# Patient Record
Sex: FEMALE | Race: WHITE | NOT HISPANIC OR LATINO | Employment: OTHER | URBAN - METROPOLITAN AREA
[De-identification: names, ages, dates, MRNs, and addresses within clinical notes are randomized per-mention and may not be internally consistent; named-entity substitution may affect disease eponyms.]

---

## 2017-01-10 ENCOUNTER — COMMUNICATION - HEALTHEAST (OUTPATIENT)
Dept: BEHAVIORAL HEALTH | Facility: CLINIC | Age: 62
End: 2017-01-10

## 2017-01-10 DIAGNOSIS — F98.8 ADD (ATTENTION DEFICIT DISORDER) WITHOUT HYPERACTIVITY: ICD-10-CM

## 2017-01-20 ENCOUNTER — COMMUNICATION - HEALTHEAST (OUTPATIENT)
Dept: ADMINISTRATIVE | Facility: CLINIC | Age: 62
End: 2017-01-20

## 2017-02-15 ENCOUNTER — COMMUNICATION - HEALTHEAST (OUTPATIENT)
Dept: BEHAVIORAL HEALTH | Facility: CLINIC | Age: 62
End: 2017-02-15

## 2017-02-15 DIAGNOSIS — F98.8 ADD (ATTENTION DEFICIT DISORDER) WITHOUT HYPERACTIVITY: ICD-10-CM

## 2017-02-21 ENCOUNTER — COMMUNICATION - HEALTHEAST (OUTPATIENT)
Dept: BEHAVIORAL HEALTH | Facility: CLINIC | Age: 62
End: 2017-02-21

## 2017-02-21 DIAGNOSIS — G89.29 OTHER CHRONIC PAIN: ICD-10-CM

## 2017-03-06 ENCOUNTER — OFFICE VISIT - HEALTHEAST (OUTPATIENT)
Dept: BEHAVIORAL HEALTH | Facility: CLINIC | Age: 62
End: 2017-03-06

## 2017-03-06 DIAGNOSIS — M16.10 ARTHROPATHY OF PELVIC REGION AND THIGH: ICD-10-CM

## 2017-03-06 DIAGNOSIS — F41.9 ANXIETY: ICD-10-CM

## 2017-03-06 DIAGNOSIS — F32.5 MAJOR DEPRESSION IN REMISSION (H): ICD-10-CM

## 2017-03-06 DIAGNOSIS — F98.8 ADD (ATTENTION DEFICIT DISORDER) WITHOUT HYPERACTIVITY: ICD-10-CM

## 2017-03-06 DIAGNOSIS — R07.81 RIB PAIN ON LEFT SIDE: ICD-10-CM

## 2017-03-06 DIAGNOSIS — F10.21 ALCOHOL DEPENDENCE IN REMISSION (H): ICD-10-CM

## 2017-03-06 ASSESSMENT — MIFFLIN-ST. JEOR: SCORE: 960.92

## 2017-03-14 ENCOUNTER — COMMUNICATION - HEALTHEAST (OUTPATIENT)
Dept: BEHAVIORAL HEALTH | Facility: CLINIC | Age: 62
End: 2017-03-14

## 2017-03-14 ENCOUNTER — RECORDS - HEALTHEAST (OUTPATIENT)
Dept: ADMINISTRATIVE | Facility: OTHER | Age: 62
End: 2017-03-14

## 2017-03-31 ENCOUNTER — COMMUNICATION - HEALTHEAST (OUTPATIENT)
Dept: INTERNAL MEDICINE | Facility: CLINIC | Age: 62
End: 2017-03-31

## 2017-03-31 DIAGNOSIS — G47.00 INSOMNIA: ICD-10-CM

## 2017-04-10 ENCOUNTER — COMMUNICATION - HEALTHEAST (OUTPATIENT)
Dept: BEHAVIORAL HEALTH | Facility: CLINIC | Age: 62
End: 2017-04-10

## 2017-04-10 DIAGNOSIS — F98.8 ADD (ATTENTION DEFICIT DISORDER) WITHOUT HYPERACTIVITY: ICD-10-CM

## 2017-05-04 ENCOUNTER — COMMUNICATION - HEALTHEAST (OUTPATIENT)
Dept: BEHAVIORAL HEALTH | Facility: CLINIC | Age: 62
End: 2017-05-04

## 2017-05-04 DIAGNOSIS — F98.8 ADD (ATTENTION DEFICIT DISORDER) WITHOUT HYPERACTIVITY: ICD-10-CM

## 2017-06-06 ENCOUNTER — COMMUNICATION - HEALTHEAST (OUTPATIENT)
Dept: BEHAVIORAL HEALTH | Facility: CLINIC | Age: 62
End: 2017-06-06

## 2017-06-06 ENCOUNTER — COMMUNICATION - HEALTHEAST (OUTPATIENT)
Dept: INTERNAL MEDICINE | Facility: CLINIC | Age: 62
End: 2017-06-06

## 2017-06-06 DIAGNOSIS — G47.00 INSOMNIA: ICD-10-CM

## 2017-06-06 DIAGNOSIS — F98.8 ADD (ATTENTION DEFICIT DISORDER) WITHOUT HYPERACTIVITY: ICD-10-CM

## 2017-06-12 ENCOUNTER — OFFICE VISIT - HEALTHEAST (OUTPATIENT)
Dept: BEHAVIORAL HEALTH | Facility: CLINIC | Age: 62
End: 2017-06-12

## 2017-06-12 DIAGNOSIS — M16.10 ARTHROPATHY OF PELVIC REGION AND THIGH: ICD-10-CM

## 2017-06-12 DIAGNOSIS — F32.5 MAJOR DEPRESSION IN REMISSION (H): ICD-10-CM

## 2017-06-12 DIAGNOSIS — F98.8 ADD (ATTENTION DEFICIT DISORDER): ICD-10-CM

## 2017-06-12 DIAGNOSIS — F10.21 ALCOHOL DEPENDENCE IN REMISSION (H): ICD-10-CM

## 2017-06-12 DIAGNOSIS — M54.12 CERVICAL NEURALGIA: ICD-10-CM

## 2017-06-12 ASSESSMENT — MIFFLIN-ST. JEOR: SCORE: 951.85

## 2017-06-22 ENCOUNTER — OFFICE VISIT - HEALTHEAST (OUTPATIENT)
Dept: FAMILY MEDICINE | Facility: CLINIC | Age: 62
End: 2017-06-22

## 2017-06-22 DIAGNOSIS — E78.2 MIXED HYPERLIPIDEMIA: ICD-10-CM

## 2017-06-22 DIAGNOSIS — F32.5 MAJOR DEPRESSION IN REMISSION (H): ICD-10-CM

## 2017-06-22 DIAGNOSIS — I42.9 IDIOPATHIC CARDIOMYOPATHY (H): ICD-10-CM

## 2017-06-22 DIAGNOSIS — Z00.00 ROUTINE GENERAL MEDICAL EXAMINATION AT A HEALTH CARE FACILITY: ICD-10-CM

## 2017-06-22 DIAGNOSIS — Z12.31 VISIT FOR SCREENING MAMMOGRAM: ICD-10-CM

## 2017-06-22 LAB
CHOLEST SERPL-MCNC: 158 MG/DL
FASTING STATUS PATIENT QL REPORTED: YES
HDLC SERPL-MCNC: 60 MG/DL
LDLC SERPL CALC-MCNC: 83 MG/DL
TRIGL SERPL-MCNC: 74 MG/DL

## 2017-06-22 ASSESSMENT — MIFFLIN-ST. JEOR: SCORE: 947.31

## 2017-06-27 ENCOUNTER — COMMUNICATION - HEALTHEAST (OUTPATIENT)
Dept: BEHAVIORAL HEALTH | Facility: CLINIC | Age: 62
End: 2017-06-27

## 2017-06-27 DIAGNOSIS — G47.00 INSOMNIA, UNSPECIFIED: ICD-10-CM

## 2017-06-28 ENCOUNTER — COMMUNICATION - HEALTHEAST (OUTPATIENT)
Dept: BEHAVIORAL HEALTH | Facility: CLINIC | Age: 62
End: 2017-06-28

## 2017-06-28 DIAGNOSIS — G47.00 INSOMNIA, UNSPECIFIED: ICD-10-CM

## 2017-07-03 ENCOUNTER — COMMUNICATION - HEALTHEAST (OUTPATIENT)
Dept: FAMILY MEDICINE | Facility: CLINIC | Age: 62
End: 2017-07-03

## 2017-07-03 DIAGNOSIS — I42.9 CARDIOMYOPATHY (H): ICD-10-CM

## 2017-07-03 DIAGNOSIS — E78.5 HYPERLIPIDEMIA: ICD-10-CM

## 2017-07-05 ENCOUNTER — COMMUNICATION - HEALTHEAST (OUTPATIENT)
Dept: INTERNAL MEDICINE | Facility: CLINIC | Age: 62
End: 2017-07-05

## 2017-07-05 DIAGNOSIS — G47.00 INSOMNIA: ICD-10-CM

## 2017-07-06 ENCOUNTER — COMMUNICATION - HEALTHEAST (OUTPATIENT)
Dept: BEHAVIORAL HEALTH | Facility: CLINIC | Age: 62
End: 2017-07-06

## 2017-07-06 DIAGNOSIS — F98.8 ADD (ATTENTION DEFICIT DISORDER) WITHOUT HYPERACTIVITY: ICD-10-CM

## 2017-07-13 ENCOUNTER — OFFICE VISIT - HEALTHEAST (OUTPATIENT)
Dept: FAMILY MEDICINE | Facility: CLINIC | Age: 62
End: 2017-07-13

## 2017-07-13 DIAGNOSIS — Z01.818 PREOP EXAMINATION: ICD-10-CM

## 2017-07-13 DIAGNOSIS — L98.8 AGE-RELATED FACIAL WRINKLES: ICD-10-CM

## 2017-07-13 LAB
ATRIAL RATE - MUSE: 61 BPM
DIASTOLIC BLOOD PRESSURE - MUSE: NORMAL MMHG
INTERPRETATION ECG - MUSE: NORMAL
P AXIS - MUSE: 75 DEGREES
PR INTERVAL - MUSE: 178 MS
QRS DURATION - MUSE: 90 MS
QT - MUSE: 430 MS
QTC - MUSE: 432 MS
R AXIS - MUSE: 26 DEGREES
SYSTOLIC BLOOD PRESSURE - MUSE: NORMAL MMHG
T AXIS - MUSE: 52 DEGREES
VENTRICULAR RATE- MUSE: 61 BPM

## 2017-07-13 ASSESSMENT — MIFFLIN-ST. JEOR: SCORE: 960.92

## 2017-07-16 ENCOUNTER — COMMUNICATION - HEALTHEAST (OUTPATIENT)
Dept: CARDIOLOGY | Facility: CLINIC | Age: 62
End: 2017-07-16

## 2017-07-16 DIAGNOSIS — I42.9 CARDIOMYOPATHY (H): ICD-10-CM

## 2017-07-17 ENCOUNTER — COMMUNICATION - HEALTHEAST (OUTPATIENT)
Dept: FAMILY MEDICINE | Facility: CLINIC | Age: 62
End: 2017-07-17

## 2017-07-18 ENCOUNTER — COMMUNICATION - HEALTHEAST (OUTPATIENT)
Dept: CARDIOLOGY | Facility: CLINIC | Age: 62
End: 2017-07-18

## 2017-07-18 DIAGNOSIS — I42.9 CARDIOMYOPATHY (H): ICD-10-CM

## 2017-07-20 ENCOUNTER — RECORDS - HEALTHEAST (OUTPATIENT)
Dept: MAMMOGRAPHY | Facility: CLINIC | Age: 62
End: 2017-07-20

## 2017-07-20 DIAGNOSIS — Z12.31 ENCOUNTER FOR SCREENING MAMMOGRAM FOR MALIGNANT NEOPLASM OF BREAST: ICD-10-CM

## 2017-07-28 ENCOUNTER — RECORDS - HEALTHEAST (OUTPATIENT)
Dept: ADMINISTRATIVE | Facility: OTHER | Age: 62
End: 2017-07-28

## 2017-08-04 ENCOUNTER — COMMUNICATION - HEALTHEAST (OUTPATIENT)
Dept: BEHAVIORAL HEALTH | Facility: CLINIC | Age: 62
End: 2017-08-04

## 2017-08-04 DIAGNOSIS — F98.8 ADD (ATTENTION DEFICIT DISORDER) WITHOUT HYPERACTIVITY: ICD-10-CM

## 2017-08-09 ENCOUNTER — COMMUNICATION - HEALTHEAST (OUTPATIENT)
Dept: BEHAVIORAL HEALTH | Facility: CLINIC | Age: 62
End: 2017-08-09

## 2017-08-09 DIAGNOSIS — G47.00 INSOMNIA, UNSPECIFIED: ICD-10-CM

## 2017-08-14 ENCOUNTER — COMMUNICATION - HEALTHEAST (OUTPATIENT)
Dept: BEHAVIORAL HEALTH | Facility: CLINIC | Age: 62
End: 2017-08-14

## 2017-08-14 DIAGNOSIS — G89.29 OTHER CHRONIC PAIN: ICD-10-CM

## 2017-09-05 ENCOUNTER — COMMUNICATION - HEALTHEAST (OUTPATIENT)
Dept: BEHAVIORAL HEALTH | Facility: CLINIC | Age: 62
End: 2017-09-05

## 2017-09-05 ENCOUNTER — COMMUNICATION - HEALTHEAST (OUTPATIENT)
Dept: FAMILY MEDICINE | Facility: CLINIC | Age: 62
End: 2017-09-05

## 2017-09-05 DIAGNOSIS — F98.8 ADD (ATTENTION DEFICIT DISORDER) WITHOUT HYPERACTIVITY: ICD-10-CM

## 2017-09-06 ENCOUNTER — AMBULATORY - HEALTHEAST (OUTPATIENT)
Dept: LAB | Facility: CLINIC | Age: 62
End: 2017-09-06

## 2017-09-06 DIAGNOSIS — F98.8 ADD (ATTENTION DEFICIT DISORDER) WITHOUT HYPERACTIVITY: ICD-10-CM

## 2017-10-02 ENCOUNTER — COMMUNICATION - HEALTHEAST (OUTPATIENT)
Dept: BEHAVIORAL HEALTH | Facility: CLINIC | Age: 62
End: 2017-10-02

## 2017-10-02 DIAGNOSIS — F98.8 ADD (ATTENTION DEFICIT DISORDER) WITHOUT HYPERACTIVITY: ICD-10-CM

## 2017-10-18 ENCOUNTER — COMMUNICATION - HEALTHEAST (OUTPATIENT)
Dept: CARDIOLOGY | Facility: CLINIC | Age: 62
End: 2017-10-18

## 2017-10-18 DIAGNOSIS — I42.9 CARDIOMYOPATHY (H): ICD-10-CM

## 2017-10-30 ENCOUNTER — COMMUNICATION - HEALTHEAST (OUTPATIENT)
Dept: BEHAVIORAL HEALTH | Facility: CLINIC | Age: 62
End: 2017-10-30

## 2017-10-30 DIAGNOSIS — F98.8 ADD (ATTENTION DEFICIT DISORDER) WITHOUT HYPERACTIVITY: ICD-10-CM

## 2017-11-12 ENCOUNTER — HEALTH MAINTENANCE LETTER (OUTPATIENT)
Age: 62
End: 2017-11-12

## 2017-11-27 ENCOUNTER — COMMUNICATION - HEALTHEAST (OUTPATIENT)
Dept: BEHAVIORAL HEALTH | Facility: CLINIC | Age: 62
End: 2017-11-27

## 2017-11-27 DIAGNOSIS — F98.8 ADD (ATTENTION DEFICIT DISORDER) WITHOUT HYPERACTIVITY: ICD-10-CM

## 2017-12-04 ENCOUNTER — OFFICE VISIT - HEALTHEAST (OUTPATIENT)
Dept: CARDIOLOGY | Facility: CLINIC | Age: 62
End: 2017-12-04

## 2017-12-04 ENCOUNTER — COMMUNICATION - HEALTHEAST (OUTPATIENT)
Dept: BEHAVIORAL HEALTH | Facility: CLINIC | Age: 62
End: 2017-12-04

## 2017-12-04 DIAGNOSIS — G47.00 INSOMNIA: ICD-10-CM

## 2017-12-04 DIAGNOSIS — I65.22 LEFT CAROTID STENOSIS: ICD-10-CM

## 2017-12-04 DIAGNOSIS — I25.10 CORONARY ATHEROSCLEROSIS DUE TO CALCIFIED CORONARY LESION: ICD-10-CM

## 2017-12-04 DIAGNOSIS — E78.2 MIXED HYPERLIPIDEMIA: ICD-10-CM

## 2017-12-04 DIAGNOSIS — I25.84 CORONARY ATHEROSCLEROSIS DUE TO CALCIFIED CORONARY LESION: ICD-10-CM

## 2017-12-04 DIAGNOSIS — I50.32 CHRONIC DIASTOLIC CHF (CONGESTIVE HEART FAILURE), NYHA CLASS 2 (H): ICD-10-CM

## 2017-12-04 ASSESSMENT — MIFFLIN-ST. JEOR: SCORE: 956.38

## 2017-12-07 ENCOUNTER — COMMUNICATION - HEALTHEAST (OUTPATIENT)
Dept: CARDIOLOGY | Facility: CLINIC | Age: 62
End: 2017-12-07

## 2017-12-07 DIAGNOSIS — I42.9 CARDIOMYOPATHY (H): ICD-10-CM

## 2017-12-20 ENCOUNTER — COMMUNICATION - HEALTHEAST (OUTPATIENT)
Dept: INTERNAL MEDICINE | Facility: CLINIC | Age: 62
End: 2017-12-20

## 2017-12-20 DIAGNOSIS — G47.00 INSOMNIA: ICD-10-CM

## 2017-12-22 ENCOUNTER — COMMUNICATION - HEALTHEAST (OUTPATIENT)
Dept: FAMILY MEDICINE | Facility: CLINIC | Age: 62
End: 2017-12-22

## 2017-12-22 DIAGNOSIS — F51.01 PRIMARY INSOMNIA: ICD-10-CM

## 2017-12-27 ENCOUNTER — COMMUNICATION - HEALTHEAST (OUTPATIENT)
Dept: BEHAVIORAL HEALTH | Facility: CLINIC | Age: 62
End: 2017-12-27

## 2017-12-27 DIAGNOSIS — F98.8 ADD (ATTENTION DEFICIT DISORDER) WITHOUT HYPERACTIVITY: ICD-10-CM

## 2018-01-09 ENCOUNTER — COMMUNICATION - HEALTHEAST (OUTPATIENT)
Dept: BEHAVIORAL HEALTH | Facility: CLINIC | Age: 63
End: 2018-01-09

## 2018-01-09 DIAGNOSIS — G47.00 INSOMNIA: ICD-10-CM

## 2018-01-12 ENCOUNTER — OFFICE VISIT - HEALTHEAST (OUTPATIENT)
Dept: BEHAVIORAL HEALTH | Facility: CLINIC | Age: 63
End: 2018-01-12

## 2018-01-12 DIAGNOSIS — F98.8 ATTENTION DEFICIT DISORDER (ADD) WITHOUT HYPERACTIVITY: ICD-10-CM

## 2018-01-12 DIAGNOSIS — F51.04 PSYCHOPHYSIOLOGICAL INSOMNIA: ICD-10-CM

## 2018-01-12 DIAGNOSIS — F10.21 ALCOHOL DEPENDENCE IN REMISSION (H): ICD-10-CM

## 2018-01-12 DIAGNOSIS — M54.12 CERVICAL NEURALGIA: ICD-10-CM

## 2018-01-12 DIAGNOSIS — F11.91 HISTORY OF NARCOTIC USE: ICD-10-CM

## 2018-01-12 DIAGNOSIS — G89.29 OTHER CHRONIC PAIN: ICD-10-CM

## 2018-01-12 DIAGNOSIS — F98.8 ADD (ATTENTION DEFICIT DISORDER) WITHOUT HYPERACTIVITY: ICD-10-CM

## 2018-01-12 DIAGNOSIS — F51.01 PRIMARY INSOMNIA: ICD-10-CM

## 2018-01-12 ASSESSMENT — MIFFLIN-ST. JEOR: SCORE: 956.38

## 2018-01-16 ENCOUNTER — COMMUNICATION - HEALTHEAST (OUTPATIENT)
Dept: BEHAVIORAL HEALTH | Facility: CLINIC | Age: 63
End: 2018-01-16

## 2018-02-08 ENCOUNTER — COMMUNICATION - HEALTHEAST (OUTPATIENT)
Dept: BEHAVIORAL HEALTH | Facility: CLINIC | Age: 63
End: 2018-02-08

## 2018-02-09 ENCOUNTER — COMMUNICATION - HEALTHEAST (OUTPATIENT)
Dept: BEHAVIORAL HEALTH | Facility: CLINIC | Age: 63
End: 2018-02-09

## 2018-02-28 ENCOUNTER — COMMUNICATION - HEALTHEAST (OUTPATIENT)
Dept: BEHAVIORAL HEALTH | Facility: CLINIC | Age: 63
End: 2018-02-28

## 2018-02-28 DIAGNOSIS — F98.8 ADD (ATTENTION DEFICIT DISORDER) WITHOUT HYPERACTIVITY: ICD-10-CM

## 2018-03-26 ENCOUNTER — COMMUNICATION - HEALTHEAST (OUTPATIENT)
Dept: BEHAVIORAL HEALTH | Facility: CLINIC | Age: 63
End: 2018-03-26

## 2018-03-26 DIAGNOSIS — F98.8 ADD (ATTENTION DEFICIT DISORDER) WITHOUT HYPERACTIVITY: ICD-10-CM

## 2018-04-23 ENCOUNTER — COMMUNICATION - HEALTHEAST (OUTPATIENT)
Dept: BEHAVIORAL HEALTH | Facility: CLINIC | Age: 63
End: 2018-04-23

## 2018-04-23 DIAGNOSIS — F98.8 ADD (ATTENTION DEFICIT DISORDER) WITHOUT HYPERACTIVITY: ICD-10-CM

## 2018-05-21 ENCOUNTER — COMMUNICATION - HEALTHEAST (OUTPATIENT)
Dept: BEHAVIORAL HEALTH | Facility: CLINIC | Age: 63
End: 2018-05-21

## 2018-05-21 DIAGNOSIS — F98.8 ADD (ATTENTION DEFICIT DISORDER) WITHOUT HYPERACTIVITY: ICD-10-CM

## 2018-05-22 ENCOUNTER — COMMUNICATION - HEALTHEAST (OUTPATIENT)
Dept: PALLIATIVE MEDICINE | Facility: OTHER | Age: 63
End: 2018-05-22

## 2018-05-22 DIAGNOSIS — F98.8 ADD (ATTENTION DEFICIT DISORDER) WITHOUT HYPERACTIVITY: ICD-10-CM

## 2018-06-03 ENCOUNTER — COMMUNICATION - HEALTHEAST (OUTPATIENT)
Dept: FAMILY MEDICINE | Facility: CLINIC | Age: 63
End: 2018-06-03

## 2018-06-03 DIAGNOSIS — F51.01 PRIMARY INSOMNIA: ICD-10-CM

## 2018-06-05 ENCOUNTER — COMMUNICATION - HEALTHEAST (OUTPATIENT)
Dept: FAMILY MEDICINE | Facility: CLINIC | Age: 63
End: 2018-06-05

## 2018-06-22 ENCOUNTER — COMMUNICATION - HEALTHEAST (OUTPATIENT)
Dept: PALLIATIVE MEDICINE | Facility: OTHER | Age: 63
End: 2018-06-22

## 2018-06-22 DIAGNOSIS — F98.8 ADD (ATTENTION DEFICIT DISORDER) WITHOUT HYPERACTIVITY: ICD-10-CM

## 2018-07-03 ENCOUNTER — COMMUNICATION - HEALTHEAST (OUTPATIENT)
Dept: FAMILY MEDICINE | Facility: CLINIC | Age: 63
End: 2018-07-03

## 2018-07-03 ENCOUNTER — COMMUNICATION - HEALTHEAST (OUTPATIENT)
Dept: BEHAVIORAL HEALTH | Facility: CLINIC | Age: 63
End: 2018-07-03

## 2018-07-03 DIAGNOSIS — E78.5 HYPERLIPIDEMIA: ICD-10-CM

## 2018-07-03 DIAGNOSIS — F51.04 PSYCHOPHYSIOLOGICAL INSOMNIA: ICD-10-CM

## 2018-07-05 ENCOUNTER — COMMUNICATION - HEALTHEAST (OUTPATIENT)
Dept: BEHAVIORAL HEALTH | Facility: CLINIC | Age: 63
End: 2018-07-05

## 2018-07-05 DIAGNOSIS — F51.04 PSYCHOPHYSIOLOGICAL INSOMNIA: ICD-10-CM

## 2018-07-20 ENCOUNTER — COMMUNICATION - HEALTHEAST (OUTPATIENT)
Dept: PALLIATIVE MEDICINE | Facility: OTHER | Age: 63
End: 2018-07-20

## 2018-07-20 DIAGNOSIS — F98.8 ADD (ATTENTION DEFICIT DISORDER) WITHOUT HYPERACTIVITY: ICD-10-CM

## 2018-07-23 ENCOUNTER — COMMUNICATION - HEALTHEAST (OUTPATIENT)
Dept: CARE COORDINATION | Facility: CLINIC | Age: 63
End: 2018-07-23

## 2018-08-02 ENCOUNTER — COMMUNICATION - HEALTHEAST (OUTPATIENT)
Dept: CARDIOLOGY | Facility: CLINIC | Age: 63
End: 2018-08-02

## 2018-08-02 DIAGNOSIS — I42.9 CARDIOMYOPATHY (H): ICD-10-CM

## 2018-08-09 ENCOUNTER — OFFICE VISIT - HEALTHEAST (OUTPATIENT)
Dept: FAMILY MEDICINE | Facility: CLINIC | Age: 63
End: 2018-08-09

## 2018-08-09 DIAGNOSIS — Z00.00 ROUTINE GENERAL MEDICAL EXAMINATION AT A HEALTH CARE FACILITY: ICD-10-CM

## 2018-08-09 DIAGNOSIS — G89.29 OTHER CHRONIC PAIN: ICD-10-CM

## 2018-08-09 DIAGNOSIS — M25.542 ARTHRALGIA OF BOTH HANDS: ICD-10-CM

## 2018-08-09 DIAGNOSIS — Z79.899 CONTROLLED SUBSTANCE AGREEMENT SIGNED: ICD-10-CM

## 2018-08-09 DIAGNOSIS — M25.541 ARTHRALGIA OF BOTH HANDS: ICD-10-CM

## 2018-08-09 DIAGNOSIS — I42.9 IDIOPATHIC CARDIOMYOPATHY (H): ICD-10-CM

## 2018-08-09 DIAGNOSIS — Z12.31 ENCOUNTER FOR SCREENING MAMMOGRAM FOR BREAST CANCER: ICD-10-CM

## 2018-08-09 DIAGNOSIS — F98.8 ADD (ATTENTION DEFICIT DISORDER) WITHOUT HYPERACTIVITY: ICD-10-CM

## 2018-08-09 DIAGNOSIS — F51.04 PSYCHOPHYSIOLOGICAL INSOMNIA: ICD-10-CM

## 2018-08-09 DIAGNOSIS — E78.2 MIXED HYPERLIPIDEMIA: ICD-10-CM

## 2018-08-09 LAB
ANION GAP SERPL CALCULATED.3IONS-SCNC: 13 MMOL/L (ref 5–18)
BUN SERPL-MCNC: 13 MG/DL (ref 8–22)
C REACTIVE PROTEIN LHE: <0.1 MG/DL (ref 0–0.8)
CALCIUM SERPL-MCNC: 9.5 MG/DL (ref 8.5–10.5)
CCP AB SER IA-ACNC: <0.5 U/ML
CHLORIDE BLD-SCNC: 105 MMOL/L (ref 98–107)
CHOLEST SERPL-MCNC: 190 MG/DL
CO2 SERPL-SCNC: 26 MMOL/L (ref 22–31)
CREAT SERPL-MCNC: 0.72 MG/DL (ref 0.6–1.1)
FASTING STATUS PATIENT QL REPORTED: YES
GFR SERPL CREATININE-BSD FRML MDRD: >60 ML/MIN/1.73M2
GLUCOSE BLD-MCNC: 84 MG/DL (ref 70–125)
HDLC SERPL-MCNC: 77 MG/DL
LDLC SERPL CALC-MCNC: 100 MG/DL
POTASSIUM BLD-SCNC: 3.5 MMOL/L (ref 3.5–5)
RHEUMATOID FACT SERPL-ACNC: <15 IU/ML (ref 0–30)
SODIUM SERPL-SCNC: 144 MMOL/L (ref 136–145)
TRIGL SERPL-MCNC: 67 MG/DL

## 2018-08-09 ASSESSMENT — MIFFLIN-ST. JEOR: SCORE: 956.38

## 2018-08-12 LAB
6-MONOACETYLMORPHINE: NOT DETECTED NG/ML
AMPHETAMINES: NEGATIVE NG/ML
BARBITURATES: NEGATIVE NG/ML
BENZODIAZEPINES: NEGATIVE NG/ML
BUPRENORPHINE: NOT DETECTED NG/ML
COCAINE: NEGATIVE NG/ML
CODEINE-6-BETA-GLUCURONIDE: NOT DETECTED NG/ML
CODEINE: NOT DETECTED NG/ML
COMMENT:: NORMAL
CREATININE URINE: 29.7 MG/DL
DIHYDROCODEINE: NOT DETECTED NG/ML
EDDP: NOT DETECTED NG/ML
FENTANYL: NOT DETECTED NG/ML
HYDROCODONE: NOT DETECTED NG/ML
HYDROMORPHONE-3-BETA-GLUCURONIDE: NOT DETECTED NG/ML
HYDROMORPHONE: NOT DETECTED NG/ML
MEPERIDINE: NOT DETECTED NG/ML
METHADONE: NOT DETECTED NG/ML
MORPHINE-6-BETA-GLUCURONIDE: NOT DETECTED NG/ML
MORPHINE: NOT DETECTED NG/ML
N-DESMETHYLTAPENTADOL: NOT DETECTED NG/ML
NALOXONE-3-BETA-GLUCURONIDE: NOT DETECTED NG/ML
NALOXONE: NOT DETECTED NG/ML
NORBUPRENORPHINE GLUCURONIDE: NOT DETECTED NG/ML
NORBUPRENORPHINE: NOT DETECTED NG/ML
NORFENTANYL: NOT DETECTED NG/ML
NORHYDROCODONE: NOT DETECTED NG/ML
NORMEPERIDINE: NOT DETECTED NG/ML
NOROXYCODONE: NOT DETECTED NG/ML
NOROXYMORPHONE: NOT DETECTED NG/ML
NORPROPOXYPHENE: NOT DETECTED NG/ML
O-DESMETHYLTRAMADOL: NOT DETECTED NG/ML
OPIOID INTERPRETATION: NORMAL
OXIDANTS URINE: NEGATIVE
OXYCODONE: NOT DETECTED NG/ML
OXYMORPHONE-3-BETA-GLUCURONIDE: NOT DETECTED NG/ML
OXYMORPHONE: NOT DETECTED NG/ML
PH UR STRIP: 7.4 [PH] (ref 5–7)
PHENCYCLIDINE: NEGATIVE NG/ML
PROPOXYPHENE: NOT DETECTED NG/ML
SP GR UR STRIP: 1.01
TAPENTADOL-BETA-GLUCURONIDE: NOT DETECTED NG/ML
TAPENTADOL: NOT DETECTED NG/ML
THC METABOLITE, UR.: NEGATIVE NG/ML
TRAMADOL: NOT DETECTED NG/ML

## 2018-08-13 LAB — ARUP MISCELLANEOUS TEST: NORMAL

## 2018-08-14 LAB
MISCELLANEOUS TEST DEPT. - HE HISTORICAL: NORMAL
PERFORMING LAB: NORMAL
SPECIMEN STATUS: NORMAL
TEST NAME: NORMAL

## 2018-08-19 ENCOUNTER — COMMUNICATION - HEALTHEAST (OUTPATIENT)
Dept: BEHAVIORAL HEALTH | Facility: CLINIC | Age: 63
End: 2018-08-19

## 2018-08-19 DIAGNOSIS — G89.29 OTHER CHRONIC PAIN: ICD-10-CM

## 2018-08-31 ENCOUNTER — AMBULATORY - HEALTHEAST (OUTPATIENT)
Dept: FAMILY MEDICINE | Facility: CLINIC | Age: 63
End: 2018-08-31

## 2018-08-31 DIAGNOSIS — Z79.899 CONTROLLED SUBSTANCE AGREEMENT SIGNED: ICD-10-CM

## 2018-09-26 ENCOUNTER — COMMUNICATION - HEALTHEAST (OUTPATIENT)
Dept: CARDIOLOGY | Facility: CLINIC | Age: 63
End: 2018-09-26

## 2018-10-03 ENCOUNTER — COMMUNICATION - HEALTHEAST (OUTPATIENT)
Dept: FAMILY MEDICINE | Facility: CLINIC | Age: 63
End: 2018-10-03

## 2018-10-03 DIAGNOSIS — F98.8 ADD (ATTENTION DEFICIT DISORDER) WITHOUT HYPERACTIVITY: ICD-10-CM

## 2018-10-03 DIAGNOSIS — E78.5 HYPERLIPIDEMIA: ICD-10-CM

## 2018-11-08 ENCOUNTER — OFFICE VISIT - HEALTHEAST (OUTPATIENT)
Dept: CARDIOLOGY | Facility: CLINIC | Age: 63
End: 2018-11-08

## 2018-11-08 DIAGNOSIS — I50.32 CHRONIC DIASTOLIC CHF (CONGESTIVE HEART FAILURE), NYHA CLASS 2 (H): ICD-10-CM

## 2018-11-08 DIAGNOSIS — I25.10 CORONARY ATHEROSCLEROSIS DUE TO CALCIFIED CORONARY LESION: ICD-10-CM

## 2018-11-08 DIAGNOSIS — I25.84 CORONARY ATHEROSCLEROSIS DUE TO CALCIFIED CORONARY LESION: ICD-10-CM

## 2018-11-08 DIAGNOSIS — I65.22 LEFT CAROTID STENOSIS: ICD-10-CM

## 2018-11-08 DIAGNOSIS — E78.2 MIXED HYPERLIPIDEMIA: ICD-10-CM

## 2018-11-08 ASSESSMENT — MIFFLIN-ST. JEOR: SCORE: 969.99

## 2018-12-03 ENCOUNTER — COMMUNICATION - HEALTHEAST (OUTPATIENT)
Dept: FAMILY MEDICINE | Facility: CLINIC | Age: 63
End: 2018-12-03

## 2018-12-03 DIAGNOSIS — F98.8 ADD (ATTENTION DEFICIT DISORDER) WITHOUT HYPERACTIVITY: ICD-10-CM

## 2019-01-07 ENCOUNTER — COMMUNICATION - HEALTHEAST (OUTPATIENT)
Dept: FAMILY MEDICINE | Facility: CLINIC | Age: 64
End: 2019-01-07

## 2019-01-07 DIAGNOSIS — F98.8 ADD (ATTENTION DEFICIT DISORDER) WITHOUT HYPERACTIVITY: ICD-10-CM

## 2019-01-22 ENCOUNTER — COMMUNICATION - HEALTHEAST (OUTPATIENT)
Dept: SCHEDULING | Facility: CLINIC | Age: 64
End: 2019-01-22

## 2019-01-22 ENCOUNTER — OFFICE VISIT - HEALTHEAST (OUTPATIENT)
Dept: FAMILY MEDICINE | Facility: CLINIC | Age: 64
End: 2019-01-22

## 2019-01-22 DIAGNOSIS — N64.4 BREAST PAIN, LEFT: ICD-10-CM

## 2019-01-22 DIAGNOSIS — H57.12 DISCOMFORT OF LEFT EYE: ICD-10-CM

## 2019-01-22 DIAGNOSIS — Z12.11 SCREEN FOR COLON CANCER: ICD-10-CM

## 2019-01-22 ASSESSMENT — MIFFLIN-ST. JEOR: SCORE: 974.89

## 2019-01-23 ENCOUNTER — RECORDS - HEALTHEAST (OUTPATIENT)
Dept: ADMINISTRATIVE | Facility: OTHER | Age: 64
End: 2019-01-23

## 2019-01-23 ENCOUNTER — COMMUNICATION - HEALTHEAST (OUTPATIENT)
Dept: FAMILY MEDICINE | Facility: CLINIC | Age: 64
End: 2019-01-23

## 2019-01-23 DIAGNOSIS — I42.9 CARDIOMYOPATHY (H): ICD-10-CM

## 2019-02-05 ENCOUNTER — COMMUNICATION - HEALTHEAST (OUTPATIENT)
Dept: FAMILY MEDICINE | Facility: CLINIC | Age: 64
End: 2019-02-05

## 2019-02-05 DIAGNOSIS — F51.04 PSYCHOPHYSIOLOGICAL INSOMNIA: ICD-10-CM

## 2019-02-10 ENCOUNTER — COMMUNICATION - HEALTHEAST (OUTPATIENT)
Dept: FAMILY MEDICINE | Facility: CLINIC | Age: 64
End: 2019-02-10

## 2019-02-10 DIAGNOSIS — F98.8 ADD (ATTENTION DEFICIT DISORDER) WITHOUT HYPERACTIVITY: ICD-10-CM

## 2019-02-15 ENCOUNTER — HOSPITAL ENCOUNTER (OUTPATIENT)
Dept: MAMMOGRAPHY | Facility: CLINIC | Age: 64
Discharge: HOME | End: 2019-02-15
Attending: NURSE PRACTITIONER

## 2019-02-15 DIAGNOSIS — N64.4 BREAST PAIN, LEFT: ICD-10-CM

## 2019-02-21 ENCOUNTER — OFFICE VISIT - HEALTHEAST (OUTPATIENT)
Dept: FAMILY MEDICINE | Facility: CLINIC | Age: 64
End: 2019-02-21

## 2019-02-21 DIAGNOSIS — G89.29 OTHER CHRONIC PAIN: ICD-10-CM

## 2019-02-21 DIAGNOSIS — F98.8 ADD (ATTENTION DEFICIT DISORDER) WITHOUT HYPERACTIVITY: ICD-10-CM

## 2019-02-21 DIAGNOSIS — F51.04 PSYCHOPHYSIOLOGICAL INSOMNIA: ICD-10-CM

## 2019-02-25 ENCOUNTER — COMMUNICATION - HEALTHEAST (OUTPATIENT)
Dept: FAMILY MEDICINE | Facility: CLINIC | Age: 64
End: 2019-02-25

## 2019-04-15 ENCOUNTER — COMMUNICATION - HEALTHEAST (OUTPATIENT)
Dept: FAMILY MEDICINE | Facility: CLINIC | Age: 64
End: 2019-04-15

## 2019-04-15 DIAGNOSIS — F98.8 ADD (ATTENTION DEFICIT DISORDER) WITHOUT HYPERACTIVITY: ICD-10-CM

## 2019-05-01 ENCOUNTER — COMMUNICATION - HEALTHEAST (OUTPATIENT)
Dept: FAMILY MEDICINE | Facility: CLINIC | Age: 64
End: 2019-05-01

## 2019-05-01 DIAGNOSIS — I42.9 CARDIOMYOPATHY (H): ICD-10-CM

## 2019-05-16 ENCOUNTER — COMMUNICATION - HEALTHEAST (OUTPATIENT)
Dept: FAMILY MEDICINE | Facility: CLINIC | Age: 64
End: 2019-05-16

## 2019-05-16 DIAGNOSIS — F98.8 ADD (ATTENTION DEFICIT DISORDER) WITHOUT HYPERACTIVITY: ICD-10-CM

## 2019-05-30 ENCOUNTER — COMMUNICATION - HEALTHEAST (OUTPATIENT)
Dept: FAMILY MEDICINE | Facility: CLINIC | Age: 64
End: 2019-05-30

## 2019-06-03 ENCOUNTER — COMMUNICATION - HEALTHEAST (OUTPATIENT)
Dept: FAMILY MEDICINE | Facility: CLINIC | Age: 64
End: 2019-06-03

## 2019-06-03 DIAGNOSIS — F51.01 PRIMARY INSOMNIA: ICD-10-CM

## 2019-06-16 ENCOUNTER — COMMUNICATION - HEALTHEAST (OUTPATIENT)
Dept: FAMILY MEDICINE | Facility: CLINIC | Age: 64
End: 2019-06-16

## 2019-06-16 DIAGNOSIS — F98.8 ADD (ATTENTION DEFICIT DISORDER) WITHOUT HYPERACTIVITY: ICD-10-CM

## 2019-07-01 ENCOUNTER — COMMUNICATION - HEALTHEAST (OUTPATIENT)
Dept: FAMILY MEDICINE | Facility: CLINIC | Age: 64
End: 2019-07-01

## 2019-07-01 DIAGNOSIS — E78.5 HYPERLIPIDEMIA: ICD-10-CM

## 2019-07-30 ENCOUNTER — COMMUNICATION - HEALTHEAST (OUTPATIENT)
Dept: FAMILY MEDICINE | Facility: CLINIC | Age: 64
End: 2019-07-30

## 2019-07-30 DIAGNOSIS — F98.8 ADD (ATTENTION DEFICIT DISORDER) WITHOUT HYPERACTIVITY: ICD-10-CM

## 2019-09-02 ENCOUNTER — COMMUNICATION - HEALTHEAST (OUTPATIENT)
Dept: FAMILY MEDICINE | Facility: CLINIC | Age: 64
End: 2019-09-02

## 2019-09-02 DIAGNOSIS — F98.8 ADD (ATTENTION DEFICIT DISORDER) WITHOUT HYPERACTIVITY: ICD-10-CM

## 2019-09-06 ENCOUNTER — COMMUNICATION - HEALTHEAST (OUTPATIENT)
Dept: FAMILY MEDICINE | Facility: CLINIC | Age: 64
End: 2019-09-06

## 2019-09-08 ENCOUNTER — COMMUNICATION - HEALTHEAST (OUTPATIENT)
Dept: FAMILY MEDICINE | Facility: CLINIC | Age: 64
End: 2019-09-08

## 2019-09-08 DIAGNOSIS — G89.29 OTHER CHRONIC PAIN: ICD-10-CM

## 2019-09-12 ENCOUNTER — COMMUNICATION - HEALTHEAST (OUTPATIENT)
Dept: FAMILY MEDICINE | Facility: CLINIC | Age: 64
End: 2019-09-12

## 2019-09-12 DIAGNOSIS — F51.04 PSYCHOPHYSIOLOGICAL INSOMNIA: ICD-10-CM

## 2019-10-02 ENCOUNTER — COMMUNICATION - HEALTHEAST (OUTPATIENT)
Dept: FAMILY MEDICINE | Facility: CLINIC | Age: 64
End: 2019-10-02

## 2019-10-02 DIAGNOSIS — F98.8 ADD (ATTENTION DEFICIT DISORDER) WITHOUT HYPERACTIVITY: ICD-10-CM

## 2019-10-06 ENCOUNTER — COMMUNICATION - HEALTHEAST (OUTPATIENT)
Dept: FAMILY MEDICINE | Facility: CLINIC | Age: 64
End: 2019-10-06

## 2019-10-06 DIAGNOSIS — G89.29 OTHER CHRONIC PAIN: ICD-10-CM

## 2019-10-07 ENCOUNTER — COMMUNICATION - HEALTHEAST (OUTPATIENT)
Dept: FAMILY MEDICINE | Facility: CLINIC | Age: 64
End: 2019-10-07

## 2019-10-07 DIAGNOSIS — F51.04 PSYCHOPHYSIOLOGICAL INSOMNIA: ICD-10-CM

## 2019-10-22 ENCOUNTER — OFFICE VISIT - HEALTHEAST (OUTPATIENT)
Dept: FAMILY MEDICINE | Facility: CLINIC | Age: 64
End: 2019-10-22

## 2019-10-22 DIAGNOSIS — Z79.899 CONTROLLED SUBSTANCE AGREEMENT SIGNED: ICD-10-CM

## 2019-10-22 DIAGNOSIS — E78.2 MIXED HYPERLIPIDEMIA: ICD-10-CM

## 2019-10-22 DIAGNOSIS — Z12.11 SPECIAL SCREENING FOR MALIGNANT NEOPLASMS, COLON: ICD-10-CM

## 2019-10-22 DIAGNOSIS — L98.9 SKIN LESION: ICD-10-CM

## 2019-10-22 DIAGNOSIS — F98.8 ADD (ATTENTION DEFICIT DISORDER) WITHOUT HYPERACTIVITY: ICD-10-CM

## 2019-10-22 DIAGNOSIS — G89.29 OTHER CHRONIC PAIN: ICD-10-CM

## 2019-10-22 ASSESSMENT — MIFFLIN-ST. JEOR: SCORE: 1038.03

## 2019-11-26 ENCOUNTER — COMMUNICATION - HEALTHEAST (OUTPATIENT)
Dept: FAMILY MEDICINE | Facility: CLINIC | Age: 64
End: 2019-11-26

## 2019-11-26 DIAGNOSIS — F98.8 ADD (ATTENTION DEFICIT DISORDER) WITHOUT HYPERACTIVITY: ICD-10-CM

## 2019-12-04 ENCOUNTER — COMMUNICATION - HEALTHEAST (OUTPATIENT)
Dept: FAMILY MEDICINE | Facility: CLINIC | Age: 64
End: 2019-12-04

## 2019-12-04 DIAGNOSIS — F51.01 PRIMARY INSOMNIA: ICD-10-CM

## 2019-12-05 ENCOUNTER — COMMUNICATION - HEALTHEAST (OUTPATIENT)
Dept: FAMILY MEDICINE | Facility: CLINIC | Age: 64
End: 2019-12-05

## 2019-12-05 DIAGNOSIS — F51.01 PRIMARY INSOMNIA: ICD-10-CM

## 2019-12-10 ENCOUNTER — RECORDS - HEALTHEAST (OUTPATIENT)
Dept: ADMINISTRATIVE | Facility: OTHER | Age: 64
End: 2019-12-10

## 2019-12-30 ENCOUNTER — COMMUNICATION - HEALTHEAST (OUTPATIENT)
Dept: FAMILY MEDICINE | Facility: CLINIC | Age: 64
End: 2019-12-30

## 2019-12-30 DIAGNOSIS — F98.8 ADD (ATTENTION DEFICIT DISORDER) WITHOUT HYPERACTIVITY: ICD-10-CM

## 2019-12-31 ENCOUNTER — COMMUNICATION - HEALTHEAST (OUTPATIENT)
Dept: FAMILY MEDICINE | Facility: CLINIC | Age: 64
End: 2019-12-31

## 2019-12-31 DIAGNOSIS — F98.8 ADD (ATTENTION DEFICIT DISORDER) WITHOUT HYPERACTIVITY: ICD-10-CM

## 2020-01-02 ENCOUNTER — COMMUNICATION - HEALTHEAST (OUTPATIENT)
Dept: FAMILY MEDICINE | Facility: CLINIC | Age: 65
End: 2020-01-02

## 2020-01-16 ENCOUNTER — RECORDS - HEALTHEAST (OUTPATIENT)
Dept: ADMINISTRATIVE | Facility: OTHER | Age: 65
End: 2020-01-16

## 2020-02-04 ENCOUNTER — COMMUNICATION - HEALTHEAST (OUTPATIENT)
Dept: ADMINISTRATIVE | Facility: CLINIC | Age: 65
End: 2020-02-04

## 2020-02-11 ENCOUNTER — COMMUNICATION - HEALTHEAST (OUTPATIENT)
Dept: FAMILY MEDICINE | Facility: CLINIC | Age: 65
End: 2020-02-11

## 2020-02-11 DIAGNOSIS — F98.8 ADD (ATTENTION DEFICIT DISORDER) WITHOUT HYPERACTIVITY: ICD-10-CM

## 2020-02-15 ENCOUNTER — COMMUNICATION - HEALTHEAST (OUTPATIENT)
Dept: FAMILY MEDICINE | Facility: CLINIC | Age: 65
End: 2020-02-15

## 2020-02-15 DIAGNOSIS — F98.8 ADD (ATTENTION DEFICIT DISORDER) WITHOUT HYPERACTIVITY: ICD-10-CM

## 2020-03-01 ENCOUNTER — HEALTH MAINTENANCE LETTER (OUTPATIENT)
Age: 65
End: 2020-03-01

## 2020-03-15 ENCOUNTER — COMMUNICATION - HEALTHEAST (OUTPATIENT)
Dept: FAMILY MEDICINE | Facility: CLINIC | Age: 65
End: 2020-03-15

## 2020-03-15 DIAGNOSIS — F98.8 ADD (ATTENTION DEFICIT DISORDER) WITHOUT HYPERACTIVITY: ICD-10-CM

## 2020-03-24 ENCOUNTER — AMBULATORY - HEALTHEAST (OUTPATIENT)
Dept: FAMILY MEDICINE | Facility: CLINIC | Age: 65
End: 2020-03-24

## 2020-03-24 DIAGNOSIS — G89.29 OTHER CHRONIC PAIN: ICD-10-CM

## 2020-04-01 ENCOUNTER — COMMUNICATION - HEALTHEAST (OUTPATIENT)
Dept: PHARMACY | Facility: CLINIC | Age: 65
End: 2020-04-01

## 2020-04-01 DIAGNOSIS — E78.5 HYPERLIPIDEMIA: ICD-10-CM

## 2020-04-11 ENCOUNTER — COMMUNICATION - HEALTHEAST (OUTPATIENT)
Dept: FAMILY MEDICINE | Facility: CLINIC | Age: 65
End: 2020-04-11

## 2020-04-13 ENCOUNTER — COMMUNICATION - HEALTHEAST (OUTPATIENT)
Dept: FAMILY MEDICINE | Facility: CLINIC | Age: 65
End: 2020-04-13

## 2020-04-13 DIAGNOSIS — F51.04 PSYCHOPHYSIOLOGICAL INSOMNIA: ICD-10-CM

## 2020-04-13 DIAGNOSIS — F98.8 ADD (ATTENTION DEFICIT DISORDER) WITHOUT HYPERACTIVITY: ICD-10-CM

## 2020-04-16 ENCOUNTER — OFFICE VISIT - HEALTHEAST (OUTPATIENT)
Dept: FAMILY MEDICINE | Facility: CLINIC | Age: 65
End: 2020-04-16

## 2020-04-16 DIAGNOSIS — M54.12 CERVICAL NEURALGIA: ICD-10-CM

## 2020-04-16 DIAGNOSIS — F51.01 PRIMARY INSOMNIA: ICD-10-CM

## 2020-04-16 DIAGNOSIS — E78.2 MIXED HYPERLIPIDEMIA: ICD-10-CM

## 2020-04-16 DIAGNOSIS — Z79.899 CONTROLLED SUBSTANCE AGREEMENT SIGNED: ICD-10-CM

## 2020-04-16 DIAGNOSIS — F98.8 ATTENTION DEFICIT DISORDER (ADD) WITHOUT HYPERACTIVITY: ICD-10-CM

## 2020-04-29 ENCOUNTER — OFFICE VISIT - HEALTHEAST (OUTPATIENT)
Dept: CARDIOLOGY | Facility: CLINIC | Age: 65
End: 2020-04-29

## 2020-04-29 DIAGNOSIS — E78.2 MIXED HYPERLIPIDEMIA: ICD-10-CM

## 2020-04-29 DIAGNOSIS — I25.84 CORONARY ATHEROSCLEROSIS DUE TO CALCIFIED CORONARY LESION: ICD-10-CM

## 2020-04-29 DIAGNOSIS — I25.10 CORONARY ATHEROSCLEROSIS DUE TO CALCIFIED CORONARY LESION: ICD-10-CM

## 2020-04-29 DIAGNOSIS — I65.22 LEFT CAROTID STENOSIS: ICD-10-CM

## 2020-04-29 DIAGNOSIS — I50.32 CHRONIC DIASTOLIC CHF (CONGESTIVE HEART FAILURE), NYHA CLASS 2 (H): ICD-10-CM

## 2020-04-29 ASSESSMENT — MIFFLIN-ST. JEOR: SCORE: 1028.96

## 2020-05-11 ENCOUNTER — COMMUNICATION - HEALTHEAST (OUTPATIENT)
Dept: FAMILY MEDICINE | Facility: CLINIC | Age: 65
End: 2020-05-11

## 2020-05-11 DIAGNOSIS — F98.8 ADD (ATTENTION DEFICIT DISORDER) WITHOUT HYPERACTIVITY: ICD-10-CM

## 2020-06-02 ENCOUNTER — COMMUNICATION - HEALTHEAST (OUTPATIENT)
Dept: FAMILY MEDICINE | Facility: CLINIC | Age: 65
End: 2020-06-02

## 2020-06-02 DIAGNOSIS — F51.01 PRIMARY INSOMNIA: ICD-10-CM

## 2020-06-09 ENCOUNTER — COMMUNICATION - HEALTHEAST (OUTPATIENT)
Dept: FAMILY MEDICINE | Facility: CLINIC | Age: 65
End: 2020-06-09

## 2020-06-09 DIAGNOSIS — F98.8 ADD (ATTENTION DEFICIT DISORDER) WITHOUT HYPERACTIVITY: ICD-10-CM

## 2020-06-16 ENCOUNTER — COMMUNICATION - HEALTHEAST (OUTPATIENT)
Dept: FAMILY MEDICINE | Facility: CLINIC | Age: 65
End: 2020-06-16

## 2020-06-16 DIAGNOSIS — I42.9 CARDIOMYOPATHY (H): ICD-10-CM

## 2020-06-22 ENCOUNTER — COMMUNICATION - HEALTHEAST (OUTPATIENT)
Dept: FAMILY MEDICINE | Facility: CLINIC | Age: 65
End: 2020-06-22

## 2020-06-24 ENCOUNTER — COMMUNICATION - HEALTHEAST (OUTPATIENT)
Dept: FAMILY MEDICINE | Facility: CLINIC | Age: 65
End: 2020-06-24

## 2020-06-24 DIAGNOSIS — G89.29 OTHER CHRONIC PAIN: ICD-10-CM

## 2020-07-09 ENCOUNTER — COMMUNICATION - HEALTHEAST (OUTPATIENT)
Dept: FAMILY MEDICINE | Facility: CLINIC | Age: 65
End: 2020-07-09

## 2020-07-09 DIAGNOSIS — F98.8 ADD (ATTENTION DEFICIT DISORDER) WITHOUT HYPERACTIVITY: ICD-10-CM

## 2020-07-13 ENCOUNTER — COMMUNICATION - HEALTHEAST (OUTPATIENT)
Dept: FAMILY MEDICINE | Facility: CLINIC | Age: 65
End: 2020-07-13

## 2020-07-13 DIAGNOSIS — F98.8 ADD (ATTENTION DEFICIT DISORDER) WITHOUT HYPERACTIVITY: ICD-10-CM

## 2020-08-09 ENCOUNTER — COMMUNICATION - HEALTHEAST (OUTPATIENT)
Dept: FAMILY MEDICINE | Facility: CLINIC | Age: 65
End: 2020-08-09

## 2020-08-09 DIAGNOSIS — F98.8 ADD (ATTENTION DEFICIT DISORDER) WITHOUT HYPERACTIVITY: ICD-10-CM

## 2020-08-10 ENCOUNTER — COMMUNICATION - HEALTHEAST (OUTPATIENT)
Dept: FAMILY MEDICINE | Facility: CLINIC | Age: 65
End: 2020-08-10

## 2020-08-10 DIAGNOSIS — Z79.899 CONTROLLED SUBSTANCE AGREEMENT SIGNED: ICD-10-CM

## 2020-08-25 ENCOUNTER — COMMUNICATION - HEALTHEAST (OUTPATIENT)
Dept: FAMILY MEDICINE | Facility: CLINIC | Age: 65
End: 2020-08-25

## 2020-08-25 DIAGNOSIS — F51.01 PRIMARY INSOMNIA: ICD-10-CM

## 2020-08-26 ENCOUNTER — COMMUNICATION - HEALTHEAST (OUTPATIENT)
Dept: FAMILY MEDICINE | Facility: CLINIC | Age: 65
End: 2020-08-26

## 2020-08-26 DIAGNOSIS — F51.01 PRIMARY INSOMNIA: ICD-10-CM

## 2020-08-27 ENCOUNTER — AMBULATORY - HEALTHEAST (OUTPATIENT)
Dept: LAB | Facility: CLINIC | Age: 65
End: 2020-08-27

## 2020-08-27 DIAGNOSIS — E78.2 MIXED HYPERLIPIDEMIA: ICD-10-CM

## 2020-08-27 LAB
CHOLEST SERPL-MCNC: 183 MG/DL
FASTING STATUS PATIENT QL REPORTED: YES
HDLC SERPL-MCNC: 67 MG/DL
LDLC SERPL CALC-MCNC: 95 MG/DL
TRIGL SERPL-MCNC: 104 MG/DL

## 2020-09-17 ENCOUNTER — COMMUNICATION - HEALTHEAST (OUTPATIENT)
Dept: FAMILY MEDICINE | Facility: CLINIC | Age: 65
End: 2020-09-17

## 2020-09-17 DIAGNOSIS — F98.8 ADD (ATTENTION DEFICIT DISORDER) WITHOUT HYPERACTIVITY: ICD-10-CM

## 2020-10-01 ENCOUNTER — COMMUNICATION - HEALTHEAST (OUTPATIENT)
Dept: FAMILY MEDICINE | Facility: CLINIC | Age: 65
End: 2020-10-01

## 2020-10-01 DIAGNOSIS — E78.5 HYPERLIPIDEMIA: ICD-10-CM

## 2020-10-01 DIAGNOSIS — G89.29 OTHER CHRONIC PAIN: ICD-10-CM

## 2020-10-05 ENCOUNTER — COMMUNICATION - HEALTHEAST (OUTPATIENT)
Dept: FAMILY MEDICINE | Facility: CLINIC | Age: 65
End: 2020-10-05

## 2020-10-09 ENCOUNTER — COMMUNICATION - HEALTHEAST (OUTPATIENT)
Dept: FAMILY MEDICINE | Facility: CLINIC | Age: 65
End: 2020-10-09

## 2020-10-12 ENCOUNTER — COMMUNICATION - HEALTHEAST (OUTPATIENT)
Dept: FAMILY MEDICINE | Facility: CLINIC | Age: 65
End: 2020-10-12

## 2020-10-12 DIAGNOSIS — F51.04 PSYCHOPHYSIOLOGICAL INSOMNIA: ICD-10-CM

## 2020-10-15 ENCOUNTER — COMMUNICATION - HEALTHEAST (OUTPATIENT)
Dept: FAMILY MEDICINE | Facility: CLINIC | Age: 65
End: 2020-10-15

## 2020-10-15 DIAGNOSIS — F98.8 ADD (ATTENTION DEFICIT DISORDER) WITHOUT HYPERACTIVITY: ICD-10-CM

## 2020-10-28 ENCOUNTER — AMBULATORY - HEALTHEAST (OUTPATIENT)
Dept: LAB | Facility: CLINIC | Age: 65
End: 2020-10-28

## 2020-10-28 DIAGNOSIS — Z79.899 CONTROLLED SUBSTANCE AGREEMENT SIGNED: ICD-10-CM

## 2020-10-31 LAB
6MAM UR QL: NOT DETECTED
7AMINOCLONAZEPAM UR QL: NOT DETECTED
A-OH ALPRAZ UR QL: NOT DETECTED
ALPRAZ UR QL: NOT DETECTED
AMPHET UR QL SCN: NOT DETECTED
BARBITURATES UR QL: NOT DETECTED
BUPRENORPHINE UR QL: NOT DETECTED
BZE UR QL: NOT DETECTED
CARBOXYTHC UR QL: NOT DETECTED
CARISOPRODOL UR QL: NOT DETECTED
CLONAZEPAM UR QL: NOT DETECTED
CODEINE UR QL: NOT DETECTED
CREAT UR-MCNC: <20 MG/DL (ref 20–400)
DIAZEPAM UR QL: NOT DETECTED
ETHYL GLUCURONIDE UR QL: NOT DETECTED
FENTANYL UR QL: NOT DETECTED
HYDROCODONE UR QL: NOT DETECTED
HYDROMORPHONE UR QL: NOT DETECTED
LORAZEPAM UR QL: NOT DETECTED
MDA UR QL: NOT DETECTED
MDEA UR QL: NOT DETECTED
MDMA UR QL: NOT DETECTED
ME-PHENIDATE UR QL: PRESENT
ME-PHENIDATE UR-MCNC: 76.6 NG/ML
MEPERIDINE UR QL: NOT DETECTED
METHADONE UR QL: NOT DETECTED
METHAMPHET UR QL: NOT DETECTED
MIDAZOLAM UR QL SCN: NOT DETECTED
MORPHINE UR QL: NOT DETECTED
NORBUPRENORPHINE UR QL CFM: NOT DETECTED
NORDIAZEPAM UR QL: NOT DETECTED
NORFENTANYL UR QL: NOT DETECTED
NORHYDROCODONE UR QL CFM: NOT DETECTED
NOROXYCODONE UR QL CFM: NOT DETECTED
NOROXYMORPHONE UR QL SCN: NOT DETECTED
OXAZEPAM UR QL: NOT DETECTED
OXYCODONE UR QL: NOT DETECTED
OXYMORPHONE UR QL: NOT DETECTED
PATHOLOGY STUDY: ABNORMAL
PCP UR QL: NOT DETECTED
PHENTERMINE UR QL: NOT DETECTED
PPAA UR-MCNC: 4638.8 NG/ML
PROPOXYPH UR QL: NOT DETECTED
SERVICE CMNT-IMP: ABNORMAL
TAPENTADOL UR QL SCN: NOT DETECTED
TAPENTADOL UR QL SCN: NOT DETECTED
TEMAZEPAM UR QL: NOT DETECTED
TRAMADOL UR QL: NOT DETECTED
ZOLPIDEM UR QL: NOT DETECTED

## 2020-11-09 ENCOUNTER — OFFICE VISIT - HEALTHEAST (OUTPATIENT)
Dept: FAMILY MEDICINE | Facility: CLINIC | Age: 65
End: 2020-11-09

## 2020-11-09 DIAGNOSIS — F98.8 ATTENTION DEFICIT DISORDER (ADD) WITHOUT HYPERACTIVITY: ICD-10-CM

## 2020-11-10 ENCOUNTER — COMMUNICATION - HEALTHEAST (OUTPATIENT)
Dept: FAMILY MEDICINE | Facility: CLINIC | Age: 65
End: 2020-11-10

## 2020-12-07 ENCOUNTER — COMMUNICATION - HEALTHEAST (OUTPATIENT)
Dept: FAMILY MEDICINE | Facility: CLINIC | Age: 65
End: 2020-12-07

## 2020-12-07 DIAGNOSIS — F98.8 ADD (ATTENTION DEFICIT DISORDER) WITHOUT HYPERACTIVITY: ICD-10-CM

## 2020-12-14 ENCOUNTER — HEALTH MAINTENANCE LETTER (OUTPATIENT)
Age: 65
End: 2020-12-14

## 2020-12-26 ENCOUNTER — COMMUNICATION - HEALTHEAST (OUTPATIENT)
Dept: FAMILY MEDICINE | Facility: CLINIC | Age: 65
End: 2020-12-26

## 2020-12-26 DIAGNOSIS — I42.9 CARDIOMYOPATHY (H): ICD-10-CM

## 2020-12-27 ENCOUNTER — COMMUNICATION - HEALTHEAST (OUTPATIENT)
Dept: FAMILY MEDICINE | Facility: CLINIC | Age: 65
End: 2020-12-27

## 2020-12-27 DIAGNOSIS — G89.29 OTHER CHRONIC PAIN: ICD-10-CM

## 2021-01-06 ENCOUNTER — COMMUNICATION - HEALTHEAST (OUTPATIENT)
Dept: FAMILY MEDICINE | Facility: CLINIC | Age: 66
End: 2021-01-06

## 2021-01-06 DIAGNOSIS — F98.8 ADD (ATTENTION DEFICIT DISORDER) WITHOUT HYPERACTIVITY: ICD-10-CM

## 2021-01-07 ENCOUNTER — COMMUNICATION - HEALTHEAST (OUTPATIENT)
Dept: FAMILY MEDICINE | Facility: CLINIC | Age: 66
End: 2021-01-07

## 2021-01-07 DIAGNOSIS — F51.04 PSYCHOPHYSIOLOGICAL INSOMNIA: ICD-10-CM

## 2021-01-08 ENCOUNTER — COMMUNICATION - HEALTHEAST (OUTPATIENT)
Dept: FAMILY MEDICINE | Facility: CLINIC | Age: 66
End: 2021-01-08

## 2021-01-08 DIAGNOSIS — F51.04 PSYCHOPHYSIOLOGICAL INSOMNIA: ICD-10-CM

## 2021-01-25 ENCOUNTER — COMMUNICATION - HEALTHEAST (OUTPATIENT)
Dept: FAMILY MEDICINE | Facility: CLINIC | Age: 66
End: 2021-01-25

## 2021-01-25 DIAGNOSIS — G89.29 OTHER CHRONIC PAIN: ICD-10-CM

## 2021-02-01 ENCOUNTER — COMMUNICATION - HEALTHEAST (OUTPATIENT)
Dept: FAMILY MEDICINE | Facility: CLINIC | Age: 66
End: 2021-02-01

## 2021-02-01 DIAGNOSIS — F98.8 ADD (ATTENTION DEFICIT DISORDER) WITHOUT HYPERACTIVITY: ICD-10-CM

## 2021-02-20 ENCOUNTER — COMMUNICATION - HEALTHEAST (OUTPATIENT)
Dept: FAMILY MEDICINE | Facility: CLINIC | Age: 66
End: 2021-02-20

## 2021-02-20 DIAGNOSIS — G89.29 OTHER CHRONIC PAIN: ICD-10-CM

## 2021-02-23 ENCOUNTER — COMMUNICATION - HEALTHEAST (OUTPATIENT)
Dept: FAMILY MEDICINE | Facility: CLINIC | Age: 66
End: 2021-02-23

## 2021-02-23 DIAGNOSIS — G89.29 OTHER CHRONIC PAIN: ICD-10-CM

## 2021-03-02 ENCOUNTER — OFFICE VISIT - HEALTHEAST (OUTPATIENT)
Dept: FAMILY MEDICINE | Facility: CLINIC | Age: 66
End: 2021-03-02

## 2021-03-02 DIAGNOSIS — F98.8 ADD (ATTENTION DEFICIT DISORDER) WITHOUT HYPERACTIVITY: ICD-10-CM

## 2021-03-02 DIAGNOSIS — Z00.00 ROUTINE GENERAL MEDICAL EXAMINATION AT A HEALTH CARE FACILITY: ICD-10-CM

## 2021-03-02 DIAGNOSIS — Z12.4 PAPANICOLAOU SMEAR FOR CERVICAL CANCER SCREENING: ICD-10-CM

## 2021-03-02 DIAGNOSIS — Z12.31 ENCOUNTER FOR SCREENING MAMMOGRAM FOR BREAST CANCER: ICD-10-CM

## 2021-03-02 ASSESSMENT — MIFFLIN-ST. JEOR: SCORE: 1001.37

## 2021-03-03 LAB
HPV SOURCE: NORMAL
HUMAN PAPILLOMA VIRUS 16 DNA: NEGATIVE
HUMAN PAPILLOMA VIRUS 18 DNA: NEGATIVE
HUMAN PAPILLOMA VIRUS FINAL DIAGNOSIS: NORMAL
HUMAN PAPILLOMA VIRUS OTHER HR: NEGATIVE
SPECIMEN DESCRIPTION: NORMAL

## 2021-03-08 LAB
BKR LAB AP ABNORMAL BLEEDING: NO
BKR LAB AP BIRTH CONTROL/HORMONES: NORMAL
BKR LAB AP CERVICAL APPEARANCE: NORMAL
BKR LAB AP GYN ADEQUACY: NORMAL
BKR LAB AP GYN INTERPRETATION: NORMAL
BKR LAB AP GYN OTHER FINDINGS: NORMAL
BKR LAB AP HPV REFLEX: NORMAL
BKR LAB AP LMP: NORMAL
BKR LAB AP PATIENT STATUS: NORMAL
BKR LAB AP PREVIOUS ABNORMAL: NORMAL
BKR LAB AP PREVIOUS NORMAL: NORMAL
HIGH RISK?: NO
PATH REPORT.COMMENTS IMP SPEC: NORMAL
RESULT FLAG (HE HISTORICAL CONVERSION): NORMAL

## 2021-03-09 ENCOUNTER — COMMUNICATION - HEALTHEAST (OUTPATIENT)
Dept: OBGYN | Facility: CLINIC | Age: 66
End: 2021-03-09

## 2021-03-10 ENCOUNTER — AMBULATORY - HEALTHEAST (OUTPATIENT)
Dept: NURSING | Facility: CLINIC | Age: 66
End: 2021-03-10

## 2021-03-26 ENCOUNTER — COMMUNICATION - HEALTHEAST (OUTPATIENT)
Dept: FAMILY MEDICINE | Facility: CLINIC | Age: 66
End: 2021-03-26

## 2021-03-26 DIAGNOSIS — I42.9 CARDIOMYOPATHY (H): ICD-10-CM

## 2021-03-31 ENCOUNTER — AMBULATORY - HEALTHEAST (OUTPATIENT)
Dept: NURSING | Facility: CLINIC | Age: 66
End: 2021-03-31

## 2021-03-31 ENCOUNTER — COMMUNICATION - HEALTHEAST (OUTPATIENT)
Dept: FAMILY MEDICINE | Facility: CLINIC | Age: 66
End: 2021-03-31

## 2021-03-31 DIAGNOSIS — E78.5 HYPERLIPIDEMIA: ICD-10-CM

## 2021-04-05 ENCOUNTER — COMMUNICATION - HEALTHEAST (OUTPATIENT)
Dept: FAMILY MEDICINE | Facility: CLINIC | Age: 66
End: 2021-04-05

## 2021-04-05 DIAGNOSIS — F98.8 ADD (ATTENTION DEFICIT DISORDER) WITHOUT HYPERACTIVITY: ICD-10-CM

## 2021-04-09 ENCOUNTER — COMMUNICATION - HEALTHEAST (OUTPATIENT)
Dept: FAMILY MEDICINE | Facility: CLINIC | Age: 66
End: 2021-04-09

## 2021-04-09 DIAGNOSIS — F51.04 PSYCHOPHYSIOLOGICAL INSOMNIA: ICD-10-CM

## 2021-04-22 ENCOUNTER — COMMUNICATION - HEALTHEAST (OUTPATIENT)
Dept: FAMILY MEDICINE | Facility: CLINIC | Age: 66
End: 2021-04-22

## 2021-04-22 DIAGNOSIS — I50.32 CHRONIC DIASTOLIC CHF (CONGESTIVE HEART FAILURE), NYHA CLASS 2 (H): ICD-10-CM

## 2021-04-23 ENCOUNTER — RECORDS - HEALTHEAST (OUTPATIENT)
Dept: GENERAL RADIOLOGY | Facility: CLINIC | Age: 66
End: 2021-04-23

## 2021-04-23 ENCOUNTER — OFFICE VISIT - HEALTHEAST (OUTPATIENT)
Dept: FAMILY MEDICINE | Facility: CLINIC | Age: 66
End: 2021-04-23

## 2021-04-23 DIAGNOSIS — M19.071 ARTHRITIS OF RIGHT ANKLE: ICD-10-CM

## 2021-04-23 DIAGNOSIS — M19.071 PRIMARY OSTEOARTHRITIS, RIGHT ANKLE AND FOOT: ICD-10-CM

## 2021-04-23 DIAGNOSIS — I50.32 CHRONIC DIASTOLIC CHF (CONGESTIVE HEART FAILURE), NYHA CLASS 2 (H): ICD-10-CM

## 2021-04-23 LAB
ALBUMIN SERPL-MCNC: 4.2 G/DL (ref 3.5–5)
ALP SERPL-CCNC: 77 U/L (ref 45–120)
ALT SERPL W P-5'-P-CCNC: 26 U/L (ref 0–45)
ANION GAP SERPL CALCULATED.3IONS-SCNC: 10 MMOL/L (ref 5–18)
AST SERPL W P-5'-P-CCNC: 29 U/L (ref 0–40)
BILIRUB SERPL-MCNC: 0.8 MG/DL (ref 0–1)
BUN SERPL-MCNC: 15 MG/DL (ref 8–22)
C REACTIVE PROTEIN LHE: 0.1 MG/DL (ref 0–0.8)
CALCIUM SERPL-MCNC: 9.9 MG/DL (ref 8.5–10.5)
CHLORIDE BLD-SCNC: 107 MMOL/L (ref 98–107)
CO2 SERPL-SCNC: 27 MMOL/L (ref 22–31)
CREAT SERPL-MCNC: 0.73 MG/DL (ref 0.6–1.1)
ERYTHROCYTE [DISTWIDTH] IN BLOOD BY AUTOMATED COUNT: 13 % (ref 11–14.5)
ERYTHROCYTE [SEDIMENTATION RATE] IN BLOOD BY WESTERGREN METHOD: 10 MM/HR (ref 0–20)
GFR SERPL CREATININE-BSD FRML MDRD: >60 ML/MIN/1.73M2
GLUCOSE BLD-MCNC: 99 MG/DL (ref 70–125)
HCT VFR BLD AUTO: 42.2 % (ref 35–47)
HGB BLD-MCNC: 13.8 G/DL (ref 12–16)
MCH RBC QN AUTO: 31.2 PG (ref 27–34)
MCHC RBC AUTO-ENTMCNC: 32.7 G/DL (ref 32–36)
MCV RBC AUTO: 96 FL (ref 80–100)
PLATELET # BLD AUTO: 257 THOU/UL (ref 140–440)
PMV BLD AUTO: 9.9 FL (ref 7–10)
POTASSIUM BLD-SCNC: 4.4 MMOL/L (ref 3.5–5)
PROT SERPL-MCNC: 6.6 G/DL (ref 6–8)
RBC # BLD AUTO: 4.42 MILL/UL (ref 3.8–5.4)
SODIUM SERPL-SCNC: 144 MMOL/L (ref 136–145)
TSH SERPL DL<=0.005 MIU/L-ACNC: 0.71 UIU/ML (ref 0.3–5)
URATE SERPL-MCNC: 3.5 MG/DL (ref 2–7.5)
WBC: 5.2 THOU/UL (ref 4–11)

## 2021-04-26 LAB
B BURGDOR IGG+IGM SER QL: 0.1 INDEX VALUE
RHEUMATOID FACT SERPL-ACNC: <15 IU/ML (ref 0–30)

## 2021-04-27 ENCOUNTER — COMMUNICATION - HEALTHEAST (OUTPATIENT)
Dept: FAMILY MEDICINE | Facility: CLINIC | Age: 66
End: 2021-04-27

## 2021-04-27 DIAGNOSIS — M13.171: ICD-10-CM

## 2021-04-27 LAB — CCP AB SER IA-ACNC: <0.5 U/ML

## 2021-04-28 LAB — ANA SER QL: 0.2 U

## 2021-05-01 ENCOUNTER — RECORDS - HEALTHEAST (OUTPATIENT)
Dept: ADMINISTRATIVE | Facility: OTHER | Age: 66
End: 2021-05-01

## 2021-05-03 ENCOUNTER — COMMUNICATION - HEALTHEAST (OUTPATIENT)
Dept: INTERNAL MEDICINE | Facility: CLINIC | Age: 66
End: 2021-05-03

## 2021-05-03 DIAGNOSIS — F98.8 ADD (ATTENTION DEFICIT DISORDER) WITHOUT HYPERACTIVITY: ICD-10-CM

## 2021-05-04 ENCOUNTER — RECORDS - HEALTHEAST (OUTPATIENT)
Dept: FAMILY MEDICINE | Facility: CLINIC | Age: 66
End: 2021-05-04

## 2021-05-04 ENCOUNTER — COMMUNICATION - HEALTHEAST (OUTPATIENT)
Dept: FAMILY MEDICINE | Facility: CLINIC | Age: 66
End: 2021-05-04

## 2021-05-21 ENCOUNTER — COMMUNICATION - HEALTHEAST (OUTPATIENT)
Dept: FAMILY MEDICINE | Facility: CLINIC | Age: 66
End: 2021-05-21

## 2021-05-21 DIAGNOSIS — F51.01 PRIMARY INSOMNIA: ICD-10-CM

## 2021-05-26 ENCOUNTER — COMMUNICATION - HEALTHEAST (OUTPATIENT)
Dept: FAMILY MEDICINE | Facility: CLINIC | Age: 66
End: 2021-05-26

## 2021-05-26 DIAGNOSIS — G89.29 OTHER CHRONIC PAIN: ICD-10-CM

## 2021-05-27 NOTE — TELEPHONE ENCOUNTER
Controlled Substance Refill Request  Medication:   Requested Prescriptions     Pending Prescriptions Disp Refills     methylphenidate HCl (CONCERTA) 54 MG CR tablet 30 tablet 0     Sig: One daily     Date Last Fill: 2/21/19  Pharmacy: [id number]   Submit electronically to pharmacy  Controlled Substance Agreement on File:   Encounter-Level CSA Scan Date:    There are no encounter-level csa scan date.       Last office visit: Last office visit pertaining to requested medication was 2/21/19.

## 2021-05-27 NOTE — TELEPHONE ENCOUNTER
Patient:   RAMÓN GARCIA JR            MRN: [id number]            FIN: [id number]               Age:   16 years     Sex:  MALE     :  [birth year]   Associated Diagnoses:   None   Author:   DIOMEDES VILLALOBOS      History of Present Illness     Source: Patient, Father   CC: Per patient, he was feeling depressed and at 2am this morning he took \"a lot\" of synthroid that is prescribed to his father's girlfriend. Per Dad, it was a 3 month supply, and per ED, it was 24.75g. He denies any trigger for his suicide attempt. No change with friends, at school or at home.    ER: Upon presentation to the ED, patient's vitals wnl and stable. On exam he was alert and in NAD, CV and neuro exams within normal limits. CBC and CMP unremarkable. Utox positive for cannabanoids. TSH low at 0.391 and fT3 elevated to 16.5. EKG showing NSR.     Patient seen and examined shortly after transfer to peds floor. Complaining of a burning sensation in his chest. Denies any lightheadedness, chest pain, palpitations, headaches. No nausea, vomiting, fever, chills, dysuria. Denies any current SI/HI.      PMHx:   - 10/2016 suicide attempt via motrin OD with subsequent  admission at [facility]   SxHx: None  Meds: None  Allergies: None  FamHx:    - Mom:  7 years ago. EtOH accidental (per Dad) overdose   - Dad: Alive and well   - Siblings: 18yo sister hx of suicide attempt. 15yo sister hx of self injury    - HEADSS:          - lives with Dad, Dad's GF and younger sister. States that his Dad is too lenient and lets him do whatever he wants          - In 9th grade, started 3 weeks ago.           - Drinks hard alcohol weekly, denies tobacco use, smokes mairjuana weekly. Last alcohol and drug use 1 week ago           - Mood symptoms as above  Immunization: Up to date  Developmental:  CSA 8/9/2018  Last refill given 3/14/19  Refill done   Did not elicit        PMD/Phone#:  Dr. Rodriguez at [address]          Review of Systems   General: no fever, chills, fatigue   HEENT: no headache, head trauma, changes in vision or hearing  CV: no chest pain, no palpitations, no LE edema  Pulm: no shortness of breath, no wheezing, no cough   GI: no nausea, vomiting, diarrhea, constipation, bloody stools  : no dysuria or hematuria  MS: no muscle or joint pain  Neuro: no numbness, tingling, or dizziness.  Skin: no rashes or concerning lesions         Physical Examination   VS/Measurements        Vitals between:   28-AUG-2017 13:56:26   TO   29-AUG-2017 13:56:26                   LAST RESULT MINIMUM MAXIMUM  Temperature 36.7 36.6 37  Heart Rate 84 76 95  Respiratory Rate 20 16 20  NISBP           118 118 128  NIDBP           75 73 83  NIMBP           89 89 98  SpO2                    100 97 100     General:  Alert and oriented, No acute distress.    Eye:  Pupils are equal, round and reactive to light, Extraocular movements are intact, eryrthematous conjunctiva b/l .    HENT:  Normocephalic, Oral mucosa is moist, No pharyngeal erythema.    Neck:  Supple, Non-tender, No lymphadenopathy.    Respiratory:  Lungs are clear to auscultation, Respirations are non-labored, Breath sounds are equal.    Cardiovascular:  Normal rate, Regular rhythm, No murmur, Good pulses equal in all extremities, Normal peripheral perfusion, No edema.    Gastrointestinal:  Soft, Non-tender, Non-distended, Normal bowel sounds.    Musculoskeletal:  Normal range of motion, Normal strength, No tenderness, No swelling, No deformity, Normal gait.    Integumentary:  Warm, Dry, No rash, no signs of self-injury .    Neurologic:  Alert, Oriented, Normal sensory, Normal motor function, No focal deficits, Cranial Nerves II-XII are grossly intact.         Deep tendon reflexes: Bilateral, Knee, 2+.         Altered level of consciousness: Drowsy.    Cognition and Speech:  Oriented, Speech clear and coherent,  benzos    - monitor patient on tele    - fT4 q12h   - patient on suicide precautions  - Kitty (psych liason) consulted, appreciate recs  - will likely need inpatient psych hospitalization once medically cleared     FEN/GI  - PO fluids   - pediatric diet    VTE: 0     Patient discussed with senior resident and attending    Soraya Lopez   PGY-1             Electronically Signed On 08/29/2017 14:27  __________________________________________________   SORAYA VILLALOBOS              I saw and examined this patient at 1:45pm.  I have reviewed the documented findings and I agree with the resident's information that is documented below except for any clarifications or changes as documented here.  No parent or guardian was present during my exam.  Plan discussed with nurse and resident teams.      Diagnoses:  suicide attempt, synthroid overdose, suidical ideation, history of suicide attempt, history of depression           Electronically Signed On 08/29/2017 15:49  __________________________________________________   SHANNAN BOBO

## 2021-05-28 NOTE — TELEPHONE ENCOUNTER
Controlled Substance Refill Request  Medication:   Requested Prescriptions     Pending Prescriptions Disp Refills     methylphenidate HCl (CONCERTA) 54 MG CR tablet 30 tablet 0     Sig: One daily     Date Last Fill: 4/18/19  Pharmacy: [id number]   Submit electronically to pharmacy  Controlled Substance Agreement on File:   Encounter-Level CSA Scan Date:    There are no encounter-level csa scan date.       Last office visit: Last office visit pertaining to requested medication was 2/21/19.

## 2021-05-28 NOTE — TELEPHONE ENCOUNTER
Refill Approved    Rx renewed per Medication Renewal Policy. Medication was last renewed on 1/25/19.    Ирина Naidu, [facility] Connection Triage/Med Refill 5/2/2019     Requested Prescriptions   Pending Prescriptions Disp Refills     carvedilol (COREG) 25 MG tablet [Pharmacy Med Name: CARVEDILOL 25MG TABLETS] 180 tablet 0     Sig: TAKE 1 TABLET BY MOUTH TWICE DAILY WITH MEALS       Beta-Blockers Refill Protocol Passed - 5/1/2019  3:49 PM        Passed - PCP or prescribing provider visit in past 12 months or next 3 months     Last office visit with prescriber/PCP: 2/21/2019 Avril Hernandez MD OR same dept: 2/21/2019 Avril Hernandez MD OR same specialty: 2/21/2019 Avril Hernandez MD  Last physical: 8/9/2018 Last MTM visit: Visit date not found   Next visit within 3 mo: Visit date not found  Next physical within 3 mo: Visit date not found  Prescriber OR PCP: Avril Hernandez MD  Last diagnosis associated with med order: 1. Cardiomyopathy (H)  - carvedilol (COREG) 25 MG tablet [Pharmacy Med Name: CARVEDILOL 25MG TABLETS]; TAKE 1 TABLET BY MOUTH TWICE DAILY WITH MEALS  Dispense: 180 tablet; Refill: 0    If protocol passes may refill for 12 months if within 3 months of last provider visit (or a total of 15 months).             Passed - Blood pressure filed in past 12 months     BP Readings from Last 1 Encounters:   02/21/19 128/72

## 2021-05-29 ENCOUNTER — RECORDS - HEALTHEAST (OUTPATIENT)
Dept: ADMINISTRATIVE | Facility: CLINIC | Age: 66
End: 2021-05-29

## 2021-05-29 NOTE — TELEPHONE ENCOUNTER
Controlled Substance Refill Request  Medication:   Requested Prescriptions     Pending Prescriptions Disp Refills     methylphenidate HCl (CONCERTA) 54 MG CR tablet 30 tablet 0     Sig: One daily     Date Last Fill: 5/17/19  Pharmacy: [id number]   Submit electronically to pharmacy  Controlled Substance Agreement on File:   Encounter-Level CSA Scan Date:    There are no encounter-level csa scan date.       Last office visit: Last office visit pertaining to requested medication was 2/21/19.

## 2021-05-30 ENCOUNTER — RECORDS - HEALTHEAST (OUTPATIENT)
Dept: ADMINISTRATIVE | Facility: CLINIC | Age: 66
End: 2021-05-30

## 2021-05-30 VITALS — HEIGHT: 60 IN | WEIGHT: 109 LBS | BODY MASS INDEX: 21.4 KG/M2

## 2021-05-30 NOTE — TELEPHONE ENCOUNTER
Refill Approved    Rx renewed per Medication Renewal Policy. Medication was last renewed on 10/6/18.    Allie Pate, [facility] Connection Triage/Med Refill 7/1/2019     Requested Prescriptions   Pending Prescriptions Disp Refills     atorvastatin (LIPITOR) 20 MG tablet 90 tablet 2     Sig: Take 1 tablet (20 mg total) by mouth daily.       Statins Refill Protocol (Hmg CoA Reductase Inhibitors) Passed - 7/1/2019 12:48 PM        Passed - PCP or prescribing provider visit in past 12 months      Last office visit with prescriber/PCP: 2/21/2019 Avril Hernandez MD OR same dept: 2/21/2019 Avril Hernandez MD OR same specialty: 2/21/2019 Avril Hernandez MD  Last physical: 8/9/2018 Last MTM visit: Visit date not found   Next visit within 3 mo: Visit date not found  Next physical within 3 mo: Visit date not found  Prescriber OR PCP: Avril Hernandez MD  Last diagnosis associated with med order: 1. Hyperlipidemia  - atorvastatin (LIPITOR) 20 MG tablet; Take 1 tablet (20 mg total) by mouth daily.  Dispense: 90 tablet; Refill: 2    If protocol passes may refill for 12 months if within 3 months of last provider visit (or a total of 15 months).

## 2021-05-30 NOTE — TELEPHONE ENCOUNTER
Controlled Substance Refill Request  Medication:   Requested Prescriptions     Pending Prescriptions Disp Refills     methylphenidate HCl (CONCERTA) 54 MG CR tablet 30 tablet 0     Sig: One daily     Date Last Fill: 6/18/19  Pharmacy: [id number]   Submit electronically to pharmacy  Controlled Substance Agreement on File:   Encounter-Level CSA Scan Date:    There are no encounter-level csa scan date.       Last office visit: Last office visit pertaining to requested medication was 2/21/19.

## 2021-05-31 ENCOUNTER — RECORDS - HEALTHEAST (OUTPATIENT)
Dept: ADMINISTRATIVE | Facility: OTHER | Age: 66
End: 2021-05-31

## 2021-05-31 VITALS — HEIGHT: 60 IN | BODY MASS INDEX: 21.2 KG/M2 | WEIGHT: 108 LBS

## 2021-05-31 VITALS — BODY MASS INDEX: 21.4 KG/M2 | HEIGHT: 60 IN | WEIGHT: 109 LBS

## 2021-05-31 VITALS — WEIGHT: 108 LBS | HEIGHT: 60 IN | BODY MASS INDEX: 21.2 KG/M2

## 2021-05-31 VITALS — WEIGHT: 107 LBS | HEIGHT: 60 IN | BODY MASS INDEX: 21.01 KG/M2

## 2021-05-31 VITALS — HEIGHT: 60 IN | WEIGHT: 106 LBS | BODY MASS INDEX: 20.81 KG/M2

## 2021-05-31 NOTE — TELEPHONE ENCOUNTER
Controlled Substance Refill Request  Medication:   Requested Prescriptions     Pending Prescriptions Disp Refills     methylphenidate HCl (CONCERTA) 54 MG CR tablet 30 tablet 0     Sig: One daily     Date Last Fill: 7/30/19  Pharmacy: [id number]   Submit electronically to pharmacy  Controlled Substance Agreement on File:   Encounter-Level CSA Scan Date:    There are no encounter-level csa scan date.       Last office visit: Last office visit pertaining to requested medication was 2/21/19.

## 2021-05-31 NOTE — TELEPHONE ENCOUNTER
MARYCRUZM with MD message below. If patient calls back please assist her in scheduling an appointment.     SUZAN/ALONSO

## 2021-05-31 NOTE — TELEPHONE ENCOUNTER
Medication: methylphenidate HCl (CONCERTA) 54 MG CR tablet  Pharmacy: [facility], [place]   Last OV: 02/21/19  Last Refill: 07/30/19  Q:30  Refills: 0       Please advise on refill.     SUZAN/ALONSO

## 2021-06-01 VITALS — HEIGHT: 60 IN | WEIGHT: 108 LBS | BODY MASS INDEX: 21.2 KG/M2

## 2021-06-01 NOTE — TELEPHONE ENCOUNTER
Controlled Substance Refill Request  Medication:   Requested Prescriptions     Pending Prescriptions Disp Refills     pregabalin (LYRICA) 200 MG capsule [Pharmacy Med Name: PREGABALIN 200MG CAPSULES] 90 capsule 0     Sig: TAKE ONE CAPSULE BY MOUTH THREE TIMES DAILY     Date Last Fill: 2/21/19  [facility]   Submit electronically to pharmacy  Controlled Substance Agreement on File:   Encounter-Level CSA Scan Date:    There are no encounter-level csa scan date.       Last office visit: Last office visit pertaining to requested medication was 2/21/19 .

## 2021-06-01 NOTE — TELEPHONE ENCOUNTER
Controlled Substance Refill Request  Medication:   Requested Prescriptions     Pending Prescriptions Disp Refills     zolpidem (AMBIEN) 10 mg tablet [Pharmacy Med Name: ZOLPIDEM 10MG TABLETS] 30 tablet 0     Sig: TAKE 1 TABLET(10 MG) BY MOUTH AT BEDTIME     Date Last Fill: 2/21/19  [id number]   Submit electronically to pharmacy  Controlled Substance Agreement on File:   Encounter-Level CSA Scan Date:    There are no encounter-level csa scan date.       Last office visit: 2/21/2019 Avril Hernandez MD Jill Thielen, RN  Triage Nurse Advisor

## 2021-06-01 NOTE — TELEPHONE ENCOUNTER
Patient sent mychart back to provider that refill not at pharmacy.      methylphenidate HCl (CONCERTA) 54 MG CR tablet 30 tablet 0 9/3/2019 10/3/2019    Sig: One daily    Sent to pharmacy as: methylphenidate HCl (CONCERTA) 54 MG CR tablet    Earliest Fill Date: 9/3/2019    E-Prescribing Status: Receipt confirmed by pharmacy (9/3/2019  2:12 PM CDT)      To provider to resent Rx.    Lissette Hairston, RN, [facility] Connection Nurse Triage/Med Refills RN

## 2021-06-01 NOTE — TELEPHONE ENCOUNTER
Medication Question or Clarification  Who is calling: Patient  What medication are you calling about? (include dose and sig)   methylphenidate HCl (CONCERTA) 54 MG CR tablet 30 tablet 0 9/3/2019 10/3/2019    Sig: One daily    Sent to pharmacy as: methylphenidate HCl (CONCERTA) 54 MG CR tablet    Earliest Fill Date: 9/3/2019        Who prescribed the medication?: Dr Hernandez  What is your question/concern?: Patient states pharmacy never received this Rx. Also needs clarification on sig.  Please send new Rx and patient would like a call when sent.  [facility] to leave a detailed message?: Yes  Site CMT - Please call the pharmacy to obtain any additional needed information.

## 2021-06-01 NOTE — TELEPHONE ENCOUNTER
Controlled Substance Refill Request  Medication:   Requested Prescriptions     Pending Prescriptions Disp Refills     methylphenidate HCl (CONCERTA) 54 MG CR tablet 30 tablet 0     Sig: One daily     Date Last Fill: 9/3/19  [id number]    Controlled Substance Agreement on File:   Encounter-Level CSA Scan Date:    There are no encounter-level csa scan date.       Last office visit: 2/21/2019 Avril Hernandez MD

## 2021-06-01 NOTE — TELEPHONE ENCOUNTER
I called the pharmacy  - they received it - somehow did not have it ready or call her which they will do now. I called her to let her know.

## 2021-06-02 ENCOUNTER — RECORDS - HEALTHEAST (OUTPATIENT)
Dept: ADMINISTRATIVE | Facility: CLINIC | Age: 66
End: 2021-06-02

## 2021-06-02 ENCOUNTER — COMMUNICATION - HEALTHEAST (OUTPATIENT)
Dept: OBGYN | Facility: CLINIC | Age: 66
End: 2021-06-02

## 2021-06-02 VITALS — WEIGHT: 111.25 LBS | BODY MASS INDEX: 21.73 KG/M2

## 2021-06-02 VITALS — HEIGHT: 60 IN | WEIGHT: 111 LBS | BODY MASS INDEX: 21.79 KG/M2

## 2021-06-02 VITALS — HEIGHT: 60 IN | WEIGHT: 112.08 LBS | BODY MASS INDEX: 22 KG/M2

## 2021-06-02 DIAGNOSIS — R87.612 LGSIL ON PAP SMEAR OF CERVIX: ICD-10-CM

## 2021-06-02 NOTE — TELEPHONE ENCOUNTER
Controlled Substance Refill Request  Medication Name:   Requested Prescriptions     Pending Prescriptions Disp Refills     zolpidem (AMBIEN) 10 mg tablet 30 tablet 0     Date Last Fill: 9.13.2019  [facility]      Submit electronically to pharmacy  Controlled Substance Agreement Date Scanned:   Encounter-Level CSA Scan Date:    There are no encounter-level csa scan date.       Last office visit with prescriber/PCP: 2/21/2019 Avril Hernandez MD OR same dept: 2/21/2019 Avril Hernandez MD OR same specialty: 2/21/2019 Avril Hernandez MD  Last physical: 8/9/2018 Last MTM visit: Visit date not found

## 2021-06-02 NOTE — TELEPHONE ENCOUNTER
Controlled Substance Refill Request  Medication:   Requested Prescriptions     Pending Prescriptions Disp Refills     pregabalin (LYRICA) 200 MG capsule [Pharmacy Med Name: PREGABALIN 200MG CAPSULES] 90 capsule 0     Sig: TAKE 1 CAPSULE BY MOUTH THREE TIMES DAILY     Date Last Fill: 9/9/19  Pharmacy: [id number]   Submit electronically to pharmacy  Controlled Substance Agreement on File:   Encounter-Level CSA Scan Date:    There are no encounter-level csa scan date.       Last office visit: Last office visit pertaining to requested medication was 2/21/19.

## 2021-06-02 NOTE — TELEPHONE ENCOUNTER
Medication: zolpidem (AMBIEN) 10 mg tablet  Pharmacy: [facility], [place]   Last OV: 02/21/19  Last Refill: 09/13/19  Q:30  Refills: 0    Please advise on refill.     SUZAN/ALONSO

## 2021-06-02 NOTE — TELEPHONE ENCOUNTER
Medication:pregabalin (LYRICA) 200 MG capsule  Pharmacy: [facility] [place]   Last OV: 02/21/19   Last Refill: 09/09/19  Q:90  Refills:0    Please advise on refill.     SUZAN/ALONSO

## 2021-06-02 NOTE — PROGRESS NOTES
Assessment and Plan    1. ADD (attention deficit disorder) without hyperactivity  Well controlled on:    - methylphenidate HCl (CONCERTA) 54 MG CR tablet; Take 1 tablet (54 mg total) by mouth daily.  Dispense: 30 tablet; Refill: 0    2. Controlled substance agreement signed  For above medication  - Drug Abuse 3, Urine; Future  - methylphenidate confirmation - Misc. Lab Test; Future    3. Skin lesion  Enlarging:  oblong, apprx 2.5 cm in length lesions, partly with rolled edges, some scaling on surface. This looks worrisome to me(NOT melanoma) - left upper back, irregular , rolled border  - Ambulatory referral to Dermatology    4. Mixed hyperlipidemia  Due for   - Lipid [place] FASTING; Future    5. Other chronic pain  refill  - pregabalin (LYRICA) 200 MG capsule; TAKE 1 CAPSULE BY MOUTH THREE TIMES DAILY  Dispense: 90 capsule; Refill: 4    6. Special screening for malignant neoplasms, colon  - Ambulatory referral for Colonoscopy      Patient Instructions   [facility]  PHONE: [phone]      Return in about 3 months (around 2020) for Annual physical.    Avril Hernandez MD     -------------------------------------------    Chief Complaint   Patient presents with     Follow-up     Medication check     Remedios is feeling healthy overall,  She is retired now - it feels amazing - she does lots of yoga weekly, feels better.  Last few years 12 hours shifts    We are still out of network so she wants to do minimum visit today - her insurance through her  will be changing as of  -   urine drug screen pricey - will wait until January    Earlier this year Remedios's Mom  doing a TAVR - (aortic valve replacement).  Her Dad  lives in [place] - he's still working as a  at home at age 94.  He plays bridge twice  week and goes on senior trips. Since December Remedios has taken 8-9 trips out there      ADHD - she feels methylphenidate still helps her organize her life.  She singed CSA  today.  No palpitations, weight loss, insomnia    Hyperlipidemia - she takes atorvastatin 20 mg daily - due for lipid panel    Low back pain - she would like a refill of LYRICA     Skin lesion on back - getting bigger -  asked her to show this to me.  She did 'roberts' in her younger years and has a family history of skin cancer   there 2-3 months - Left upper back      BP Readings from Last 3 Encounters:   10/22/19 130/70   02/21/19 128/72   01/22/19 150/76       Preventive   - She will  get flu  And shingles vaccine at [facility]        Current Outpatient Medications on File Prior to Visit   Medication Sig Dispense Refill     aspirin 81 MG EC tablet Take 81 mg by mouth daily.       atorvastatin (LIPITOR) 20 MG tablet Take 1 tablet (20 mg total) by mouth daily. 90 tablet 2     carvedilol (COREG) 25 MG tablet TAKE 1 TABLET BY MOUTH TWICE DAILY WITH MEALS 180 tablet 2     methylphenidate HCl (CONCERTA) 54 MG CR tablet One daily 30 tablet 0     traZODone (DESYREL) 100 MG tablet One at bedtime 90 tablet 1     zolpidem (AMBIEN) 10 mg tablet TAKE 1 TABLET(10 MG) BY MOUTH AT BEDTIME 90 tablet 1     furosemide (LASIX) 20 MG tablet TAKE 1 TABLET BY MOUTH DAILY 90 tablet 1     No current facility-administered medications on file prior to visit.          Health Maintenance Due   Topic Date Due     ZOSTER VACCINES (1 of 2) 04/14/2006     COLONOSCOPY  03/19/2019     INFLUENZA VACCINE RULE BASED (1) 08/01/2019     PREVENTIVE CARE VISIT  08/09/2019     Health Maintenance reviewed - .    The history section was last reviewed by Toshia Becker, LUCY on Oct 22, 2019.    Social History     Tobacco Use   Smoking Status Never Smoker   Smokeless Tobacco Never Used       Social History     Substance and Sexual Activity   Alcohol Use No    Comment: stop drinking in [facility] 2004         Vitals:    10/22/19 1339   BP: 130/70   Pulse: 88     Body mass index is 19.22 kg/m .     EXAM:    General appearance - alert, well appearing, and in no  distress  Mental status - normal mood, behavior, speech, dress, motor activity, and thought processes  Skin - oblong, apprx 2.5 cm in length lesions, partly with rolled edges, some scaling on surface

## 2021-06-03 ENCOUNTER — COMMUNICATION - HEALTHEAST (OUTPATIENT)
Dept: INTERNAL MEDICINE | Facility: CLINIC | Age: 66
End: 2021-06-03

## 2021-06-03 VITALS
SYSTOLIC BLOOD PRESSURE: 130 MMHG | WEIGHT: 112 LBS | HEIGHT: 64 IN | BODY MASS INDEX: 19.12 KG/M2 | HEART RATE: 88 BPM | DIASTOLIC BLOOD PRESSURE: 70 MMHG

## 2021-06-03 DIAGNOSIS — F98.8 ADD (ATTENTION DEFICIT DISORDER) WITHOUT HYPERACTIVITY: ICD-10-CM

## 2021-06-03 NOTE — TELEPHONE ENCOUNTER
CSA and drug screen done 19/29/19:       Diagnosis: 1) ADHD 2) insomnia  Medication : 1) Methylphenidate 54 mg CR 2) zolpidem 10 mg  Quantity per month: 1) 30) 30  Number of refills before follow up visit: 6 months - may be by mychart    I approve this but can't sign from home PLEASE have covering MD approve

## 2021-06-03 NOTE — TELEPHONE ENCOUNTER
Controlled Substance Refill Request  Medication:   Requested Prescriptions     Pending Prescriptions Disp Refills     methylphenidate HCl (CONCERTA) 54 MG CR tablet 30 tablet 0     Sig: Take 1 tablet (54 mg total) by mouth daily.       Date Last Fill: 11/2/19    Ph[facility]        Submit electronically to pharmacy      Controlled Substance Agreement on File:   Encounter-Level CSA Scan Date:    There are no encounter-level csa scan date.           Last office visit: 10/22/2019 Avril Hernandez MD

## 2021-06-04 VITALS — BODY MASS INDEX: 18.78 KG/M2 | WEIGHT: 110 LBS | HEIGHT: 64 IN

## 2021-06-04 NOTE — TELEPHONE ENCOUNTER
Controlled Substance Refill Request  Medication:   Requested Prescriptions     Pending Prescriptions Disp Refills     methylphenidate HCl (CONCERTA) 54 MG CR tablet 30 tablet 0     Sig: Take 1 tablet (54 mg total) by mouth daily.     Date Last Fill: 12/2/19  Pharmacy: [id number]   Submit electronically to pharmacy  Controlled Substance Agreement on File:   Encounter-Level CSA Scan Date - 08/09/2018:    Scan on 8/13/2018 12:47 PM       Last office visit: Last office visit pertaining to requested medication was 10/22/19.

## 2021-06-04 NOTE — TELEPHONE ENCOUNTER
CSA 10/29/19:       Diagnosis: 1) ADHD 2) insomnia  Medication : 1) Methylphenidate 54 mg CR 2) zolpidem 10 mg  Quantity per month: 1) 30) 30  Number of refills before follow up visit: 6 months - may be by [facility]

## 2021-06-04 NOTE — TELEPHONE ENCOUNTER
Controlled Substance Refill Request  Medication:   Requested Prescriptions     Pending Prescriptions Disp Refills     methylphenidate HCl (CONCERTA) 54 MG CR tablet 30 tablet 0     Sig: One daily     Date Last Fill: 10/6/19  Pharmacy: [id number]   Submit electronically to pharmacy  Controlled Substance Agreement on File:   Encounter-Level CSA Scan Date - 08/09/2018:    Scan on 8/13/2018 12:47 PM       Last office visit: Last office visit pertaining to requested medication was 10/22/19.

## 2021-06-04 NOTE — TELEPHONE ENCOUNTER
Medication:   Disp Refills Start End MITCH   methylphenidate HCl (CONCERTA) 54 MG CR tablet 30 tablet 0 12/2/2019 1/1/2020 No   Sig - Route: Take 1 tablet (54 mg total) by mouth daily. - Oral     Pharmacy: [id number] - [place], [place] - [address] AT [facility] [address]    Last Office Visit: 10/22/19    Last Refill: 12/2/19    Quantity: 30    Refills:  2

## 2021-06-05 ENCOUNTER — RECORDS - HEALTHEAST (OUTPATIENT)
Dept: ADMINISTRATIVE | Facility: CLINIC | Age: 66
End: 2021-06-05

## 2021-06-05 VITALS
BODY MASS INDEX: 19.27 KG/M2 | HEART RATE: 83 BPM | OXYGEN SATURATION: 98 % | WEIGHT: 109.13 LBS | DIASTOLIC BLOOD PRESSURE: 80 MMHG | SYSTOLIC BLOOD PRESSURE: 130 MMHG

## 2021-06-05 VITALS
DIASTOLIC BLOOD PRESSURE: 76 MMHG | HEIGHT: 63 IN | WEIGHT: 107.1 LBS | OXYGEN SATURATION: 100 % | SYSTOLIC BLOOD PRESSURE: 138 MMHG | TEMPERATURE: 98.6 F | BODY MASS INDEX: 18.98 KG/M2 | HEART RATE: 83 BPM

## 2021-06-06 NOTE — TELEPHONE ENCOUNTER
Medication: Methylphenidate HCI (Concerta) 54 mg CR tablet  Pharmacy: [facility] [address]  Last OV: 10/22/19 with Dr. Hernandez  Last refill: 2/18/20, # 30 no refills authorized by Dr. Mary Pope, [facility]

## 2021-06-06 NOTE — TELEPHONE ENCOUNTER
Controlled Substance Refill Request  Medication Name:   Requested Prescriptions     Pending Prescriptions Disp Refills     methylphenidate HCl (CONCERTA) 54 MG CR tablet 30 tablet 0     Sig: One daily     Date Last Fill: 10/3/19  [facility]ed [facility]  Submit electronically to pharmacy  Controlled Substance Agreement on file:   Encounter-Level CSA Scan Date - 08/09/2018:    Scan on 8/13/2018 12:47 PM        Last office visit:  10/22/19

## 2021-06-06 NOTE — TELEPHONE ENCOUNTER
"Controlled Substance Refill Request  Medication Name:  concerta 54 mg CR () one daily  Date Last Fill: 10/3/2019  Quantity: 30  Number of refills: 0  Controlled Substance Agreement on File: Yes: 10/28/2019   Early Request:  No  Plan Last OV:   Last office visit addressing: ADD  Date: 10/22/2019  Copy/Past OV Follow-up Plan:    \"1. ADD (attention deficit disorder) without hyperactivity  Well controlled on:    - methylphenidate HCl (CONCERTA) 54 MG CR tablet; Take 1 tablet (54 mg total) by mouth daily.  Dispense: 30 tablet; Refill: 0\"     \"2. Controlled substance agreement signed  For above medication  - Drug Abuse 3, Urine; Future  - methylphenidate confirmation - Misc. Lab Test;\"     \"Return in about 3 months (around 2020) for Annual physical.\"    Alejandrina Perera RN, BAN, Nurse Advisor, [place] 11p-7a    "

## 2021-06-06 NOTE — TELEPHONE ENCOUNTER
Last RX was 1/2/2020    methylphenidate HCl (CONCERTA) 54 MG CR tablet 30 tablet 0 1/2/2020 2/1/2020 No   Sig - Route: Take 1 tablet (54 mg total) by mouth daily. - Oral   Sent to pharmacy as: methylphenidate ER 54 mg tablet,extended release 24 hr (CONCERTA)   Earliest Fill Date: 1/2/2020   E-Prescribing Status: Receipt confirmed by pharmacy (1/2/2020  6:02 PM CST)

## 2021-06-06 NOTE — TELEPHONE ENCOUNTER
Patient Returning Call  Reason for call:  Patient called back.  Information relayed to patient:  n/a  Patient has additional questions:  Yes  If YES, what are your questions/concerns:  Calling on the status of this medication.  Please address today.  Okay to leave a detailed message?: Yes

## 2021-06-07 NOTE — TELEPHONE ENCOUNTER
CSA 10/22/19:    Non-opioid Controlled Substance Agreement    Diagnosis: 1) ADHD 2) insomnia  Medication : 1) Methylphenidate 54 mg CR 2) zolpidem 10 mg  Quantity per month: 1) 30) 30  Number of refills before follow up visit: 6 months - [facility]     - Please call her and ask her to schedule follow up phoen visit. Or request E-visit by Carlos - may be under miscellaneous and I will send her the appropriate questions once I get evisit request

## 2021-06-07 NOTE — TELEPHONE ENCOUNTER
Medication:   Disp  Refills  Start  End  MITCH    zolpidem (AMBIEN) 10 mg tablet  90 tablet  1  10/9/2019   No    Sig: TAKE 1 TABLET(10 MG) BY MOUTH AT BEDTIME        Pharmacy:[id number] - [place], [place] - [address] AT [facility] [address]     Last Office Visit:10/22/19  __________________________________    Medication:   Disp  Refills  Start  End  MITCH    methylphenidate HCl (CONCERTA) 54 MG CR tablet  30 tablet  0  3/19/2020  4/13/2020  No    Sig: One daily    Sent to pharmacy as: methylphenidate ER 54 mg tablet,extended release 24 hr (Concerta)

## 2021-06-07 NOTE — TELEPHONE ENCOUNTER
[facility]. Last seen 10/22/19 by PCP therefore will send 6 month supply.     Cherelle Maldonado, Pharm.D., BCACP  Medication Therapy Management Pharmacist  [facility]

## 2021-06-07 NOTE — PROGRESS NOTES
"Cindy Beltrán is a 65 y.o. female who is being evaluated via a billable telephone visit.      The patient has been notified of following:     \"This telephone visit will be conducted via a call between you and your physician/provider. We have found that certain health care needs can be provided without the need for a physical exam.  This service lets us provide the care you need with a short phone conversation.  If a prescription is necessary we can send it directly to your pharmacy.  If lab work is needed we can place an order for that and you can then stop by our lab to have the test done at a later time.    Telephone visits are billed at different rates depending on your insurance coverage. During this emergency period, for some insurers they may be billed the same as an in-person visit.  Please reach out to your insurance provider with any questions.    If during the course of the call the physician/provider feels a telephone visit is not appropriate, you will not be charged for this service.\"    Patient has given verbal consent to a Telephone visit? Yes    Patient would like to receive their AVS by AVS Preference: Naya.    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 Remedios has been doing TV Yoga, and starting weight - 5 pounds    She [place] about her [place], [place] is going to be 94 in a few weeks -  they have a woman who comes in to help him Monday thru Friday, meds, groceries. She is wearing a mask and gloves.  Daughter and son live nearby and have children so they don't see him. She and all her siblings do Facetime with him.    Only time she has interactions with other people  is if she needs to run to the store.  " "She has a history of \"wicked pneumonias.\"    Insomnia  Generally sleep 8 hours straight - when she takes her zolpidem  Refill meds when nec    Coronary artery disease/ CHF  Any chest pain, palpitations, or dyspnea? None  Feels really good.  Occasional palpitations, anxiety provoked  Sees Dr. Thakur every year - takes lasix rarely - once in a great while    Chronic pain  Lyrica - \"I feel amazing!\" - every once in a while she doesn't take the third one - dose she is on has side effects    ADHD  Do use drink caffeinated beverages , and if so how many in a day on average? - no    Are having any heart palpitations? - no    Are you having any decreased appetite? -  no    Have you been losing any weight? no    What does your medication help you do at work and at home?  Helps her be productive at home.  She is a voracious reading - with the concerta she is able to read longer and more focused. She can absolutely complete a project.  Does not feel as hyper on this medication as she has with others    Are there tasks you have trouble with despite taking medication? No    --  Assessment and Plan    1. Attention deficit disorder (ADD) without hyperactivity  Well controlled on:     methylphenidate HCl (CONCERTA) 54 MG CR tablet  30 tablet  0  4/14/2020 5/8/2020    (filled in refill encounter)    OK 6 months refills    2. Primary insomnia  Well controlled on:     zolpidem (AMBIEN) 10 mg tablet  90 tablet  1  4/14/2020      Sig: TAKE 1 TABLET(10 MG) BY MOUTH AT BEDTIME     Sent to pharmacy as: zolpidem 10 mg tablet (AMBIEN)     (filled in refill encounter)      3. Mixed hyperlipidemia  Taking atorvastatin.  Once quarantine from  CoVid19 pandemic is over, she will come back for   - Lipid [place] FASTING; Future    4. Cervical neuralgia  Well controlled on:   I can fill this in the next since months as needed - not due today    5. Controlled substance agreement signed  - Drug Abuse 1+, Urine; Future  - methylphenidate confirmation - " Misc. Lab Test; Future          Phone call duration:  20 minutes    Return in about 6 months (around 10/16/2020) for follow up.      Avril Hernandez MD

## 2021-06-08 NOTE — TELEPHONE ENCOUNTER
RN cannot approve Refill Request    RN can NOT refill this medication pt reports is taking 25 mg at bedtime.       Ирина Naidu, [facility] Triage/Med Refill 6/18/2020    Requested Prescriptions   Pending Prescriptions Disp Refills     carvediloL (COREG) 25 MG tablet [Pharmacy Med Name: CARVEDILOL 25MG TABLETS] 180 tablet 2     Sig: TAKE 1 TABLET BY MOUTH TWICE DAILY WITH MEALS       Beta-Blockers Refill Protocol Passed - 6/16/2020 10:59 AM        Passed - PCP or prescribing provider visit in past 12 months or next 3 months     Last office visit with prescriber/PCP: 10/22/2019 Avril Hernandez MD OR same dept: 10/22/2019 Avril Hernandez MD OR same specialty: 10/22/2019 Avril Hernandez MD  Last physical: 8/9/2018 Last MTM visit: Visit date not found   Next visit within 3 mo: Visit date not found  Next physical within 3 mo: Visit date not found  Prescriber OR PCP: Avril Hernandez MD  Last diagnosis associated with med order: 1. Cardiomyopathy (H)  - carvediloL (COREG) 25 MG tablet [Pharmacy Med Name: CARVEDILOL 25MG TABLETS]; TAKE 1 TABLET BY MOUTH TWICE DAILY WITH MEALS  Dispense: 180 tablet; Refill: 2    If protocol passes may refill for 12 months if within 3 months of last provider visit (or a total of 15 months).             Passed - Blood pressure filed in past 12 months     BP Readings from Last 1 Encounters:   10/22/19 130/70

## 2021-06-08 NOTE — TELEPHONE ENCOUNTER
Medication: Methylphenidate HCI ( CONCERTA ) 54MG CR tablets  Pharmacy: [facility] [address]   Last OV: 4/16/20  Last refill: 4/14/20  CSA: 10/28/19    LUCY Forrester

## 2021-06-08 NOTE — TELEPHONE ENCOUNTER
Medication: Concerta 54 MG  Pharmacy:[facility]   Last OV: 4/16/20  Last refill: 5/13/20    Per medication list:    methylphenidate HCl (CONCERTA) 54 MG CR tablet  30 tablet  0  5/13/2020 6/13/2020  No    Sig: One daily    Sent to pharmacy as: methylphenidate ER 54 mg tablet,extended release 24 hr (Concerta)    Earliest Fill Date: 5/13/2020    E-Prescribing Status: Receipt confirmed by pharmacy (5/12/2020  1:21 PM CDT)      Per last visit notes:    ADHD  Do use drink caffeinated beverages , and if so how many in a day on average? - no     Are having any heart palpitations? - no     Are you having any decreased appetite? -  no     Have you been losing any weight? no     What does your medication help you do at work and at home?  Helps her be productive at home.  She is a voracious reading - with the concerta she is able to read longer and more focused. She can absolutely complete a project.  Does not feel as hyper on this medication as she has with others     Are there tasks you have trouble with despite taking medication? No     --  Assessment and Plan     1. Attention deficit disorder (ADD) without hyperactivity  Well controlled on:      methylphenidate HCl (CONCERTA) 54 MG CR tablet  30 tablet  0  4/14/2020 5/8/2020    (filled in refill encounter)     OK 6 months refills    LUCY Forrester

## 2021-06-08 NOTE — TELEPHONE ENCOUNTER
Controlled Substance Refill Request  Medication Name:   Requested Prescriptions     Pending Prescriptions Disp Refills     methylphenidate HCl (CONCERTA) 54 MG CR tablet 30 tablet 0     Sig: One daily     Date Last Fill: 4/14/20  [facility]ed [facility]  Submit electronically to pharmacy  Controlled Substance Agreement on file:   Encounter-Level CSA Scan Date - 08/09/2018:    Scan on 8/13/2018 12:47 PM        Last office visit:  4/16/20

## 2021-06-08 NOTE — TELEPHONE ENCOUNTER
Controlled Substance Refill Request  Medication Name:   Requested Prescriptions     Pending Prescriptions Disp Refills     methylphenidate HCl (CONCERTA) 54 MG CR tablet 30 tablet 0     Sig: One daily     Date Last Fill: 5/13/20  Requested [facility]acy: [facility]  Submit electronically to pharmacy  Controlled Substance Agreement on file:   Encounter-Level CSA Scan Date - 08/09/2018:    Scan on 8/13/2018 12:47 PM        Last office visit:  4/16/20

## 2021-06-09 NOTE — TELEPHONE ENCOUNTER
Patient states she os completely out of medication requesting to process as soon as possible .  Controlled Substance Refill Request  Medication Name:   Requested Prescriptions     Pending Prescriptions Disp Refills     pregabalin (LYRICA) 200 MG capsule 90 capsule 2     Sig: TAKE 1 CAPSULE BY MOUTH THREE TIMES DAILY   Date Last Fill: 03/24/20  Is patient out of medication?:  Yes  Patient notified refills processed within 3 business days:  Yes  Requested Pharmacy: [id number]  Submit electronically to pharmacy  Controlled Substance Agreement on file:   Encounter-Level CSA Scan Date - 08/09/2018:    Scan on 8/13/2018 12:47 PM        Last office visit:  04/16/20

## 2021-06-09 NOTE — TELEPHONE ENCOUNTER
Controlled Substance Refill Request  Medication Name:   Requested Prescriptions     Pending Prescriptions Disp Refills     methylphenidate HCl (CONCERTA) 54 MG CR tablet 30 tablet 0     Sig: One daily     Date Last Fill: 6/11/20  Requested [facility]acy: [facility]  Submit electronically to pharmacy  Controlled Substance Agreement on file:   Encounter-Level CSA Scan Date - 08/09/2018:    Scan on 8/13/2018 12:47 PM        Last office visit:  4/16/20  Maegan Abdullahi RN, MA  [facility]    Triage Nurse Advisor

## 2021-06-09 NOTE — TELEPHONE ENCOUNTER
Controlled Substance Refill Request  Medication Name:   Requested Prescriptions     Pending Prescriptions Disp Refills     methylphenidate HCl (CONCERTA) 54 MG CR tablet 30 tablet 0     Sig: One daily     Date Last Fill: 6/12/20  Requested [facility]acy: [facility]  Submit electronically to pharmacy  Controlled Substance Agreement on file:   Encounter-Level CSA Scan Date - 08/09/2018:    Scan on 8/13/2018 12:47 PM        Last office visit:  4/16/20

## 2021-06-09 NOTE — PROGRESS NOTES
Mental Health and Addiction Medicine  Outpatient Subsequent Visit  By Alina Caceres M.D.    3/6/2017    Cindy Beltrán, 1955.    This note was created by undersigned using a dictation system. All typing errors or contextual distortion is unintentional and software inherent.      Chief Complaint:  Patient was seen today in follow-up for  Chief Complaint   Patient presents with     ADD     Follow-up     alcohol dependence in remission     Medication Management     As well as left anterior rib pain, intermittent, sister with end stage alcohol use disorder    Impression/Medical Decision-Making:  Cindy Beltrán is in long-term remission from alcohol use disorder that was severe.  She is very involved in 12-step programming in the community and has many sober friends.  Sounds like her sister is not doing so well and we had quite a bit of discussion about the guilt she feels when she sets boundaries with her.  In fact the guilt is really a brain connection that was developed long ago and isn't really providing her with accurate information because she would be doing her sister more  of a disservice if she took on actions that really belong in her own life.  Shared that sometimes we who are are codependent forget that in many ways our actions are disrespectful of the people that we are trying to help.  I highly recommend that she consider going to [employer].  I did recommend a particular meeting called Friday on [facility] that has quite a lot of people who are in recovery from other things and have come to work in [employer] after they've learned a good deal but still are plagued by some issues.  Remedios is fairly anxious and worries about a lot of things I think that [employer] would be good for her on many levels.  Remedios has attention deficit disorder that we diagnosed she is on methylphenidate ER 54 mg.  She uses it as prescribed and this is confirmed by her [place] prescription monitoring report.  She actually  reports that at her last visit to the pharmacy she was told it was not covered by her insurance company and she paid cash for it with good Rx discount.  I agreed to look into that it would seem as though it could potentially be covered with a prior authorization.  Remedios does work with a particular chiropractor her rib pain sounds a bit like what I seen a number of times with a subluxed ribs that can be put back in place in one visit and things are much better she is a little anxious comes painful but she will consider it.  May also visit her primary care practitioner about it.  I think that if she does a U tox screen at [facility] would be covered by her insurance and I will forward this note to Avril yap and ask her if she would be comfortable with her doing that they're I can put it in his future order.  We've agreed on follow-up every 6 months at this point but she can call at any time for problems.      Diagnoses/Plan:  Cindy was seen today for add, follow-up and medication management.    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  States she is doing pretty well.  Still extremely aware that it costs her probably $200 to get a UA here.  She is taking her Adderall as prescribed and still finds it very valuable.  She has having a problem with one of her left anterior ribs being painful bit intermittent it's present with inspiration feels like it sort of pops at times.  Her sister is a chronic alcoholic who was just in [place] for several weeks and then back in the hospital with alcohol intoxication.  She's learned  how to keep her boundaries fairly well but then she feels guilty.  Thinks that she is pretty close to end-stage disease.  Remedios is in long-term recovery from alcohol use disorder.  She is involved in 12-step programming in the community most of her friends are sober.  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  She has received significant benefit for pain with the use of Lyrica.  Is very happy with her new primary care practitioner Avril Hernandez at [facility].  She does use zolpidem 10 mg chronically for sleep and states it works well for her she has no middle of the night awakenings or activities that she doesn't remember.    Current Stressors: other psychosocial or environmental problems    Addiction History:  This is substantially documented elsewhere.  The patient denied recent substance use.    Recovery Environment:  This is substantially documented elsewhere.    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 30 tablet 11     carvedilol (COREG) 25 MG tablet Take 1 tablet (25 mg total) by mouth 2 (two) times a day with meals. 180 tablet 3     furosemide (LASIX) 20 MG tablet Take 1 tablet (20 mg total) by mouth daily. 30 tablet 11     methylphenidate (CONCERTA) 54 MG CR tablet One daily 30 tablet 0     pregabalin (LYRICA) 200 MG capsule TAKE 1 CAPSULE BY  MOUTH 3 TIMES A DAY. 90 capsule 5     traZODone (DESYREL) 100 MG tablet Take 1 tablet (100 mg total) by mouth bedtime as needed. 30 tablet 6     zolpidem (AMBIEN) 10 mg tablet One at bedtime as needed for sleep 30 tablet 5     No current facility-administered medications for this visit.          Medication Side Effects:  Dry mouth    Clinincal Outcome Measures:  PHQ-9   0  MIN-7   0    Review of Systems: Positive findings are noted below or elsewhere in this record, otherwise a 10 point review of systems is negative.  This is substantially documented elsewhere.   See 5 page clinical history under Media tab      [place] Prescription Monitoring Program:  No worrisome pharmacy activity.  Recorded in the chart    Objective:     Vitals:    03/06/17 1104   BP: 123/68   Pulse: 87   Temp: 97.6  F (36.4  C)   Weight: 109 lb (49.4 kg)   Height: 5' (1.524 m)   PainSc:   2   PainLoc: Rib Cage     Body mass index is 21.29 kg/(m^2).    General appearance: normal   Level of consciousness: alert   Gait/Station: Normal   Muscle Strength/Tone: No gross abnormalities   Postural tremor in the upper extremities: None   Cogwheel rigidity at the elbow or wrists:absent   Point-to-point maneuvers: normal   Abnormal motor movements:None noted       Psychiatric Mental Status Examination   Appearance/Grooming, dress and hygiene:Normal   Consciousness/Orientation: Normal, A+O to name, date, place and situation   Behavior/Attitude:Cooperative   Mood: Euthymic:  Friendly and Pleasant and Apprehensive:   Tense  Affect:   Range -> Full   Intensity -> normal   Eye contact: within normal limits   Speech: Normal   Thought processes: Normal  Thought content:Normal, denies suicidal homicidal ideation, denies auditory and visual hallucinations  Perceptions:Normal  Insight and judgement: Recognition of illness, Taking steps to manage illness and Readiness to change   Memory-Recent: Normal   Memory-Remote: Normal   Attention/Concentration: Normal    Language: Normal   Fund of Knowledge: average for current situation    Summary of Diagnostic Studies:    Review indicates:    Urine toxicology is not indicated for this patient at this time.    Discharge Planning:    Reviewed risks/benefits of medication and Patient able to verbalize understanding of side effects   Check out Flash, really important    Friday at 9:30 on [address] about [address]    [facility]  Alina mcfarlane Message Dr. Hernandez    Consider Chiropractic opinion about your rib    Total Time:    20  minutes                  Coordination of Care:     20   minutes spent on this visit with more than 50% time spent face to face in education about diagnosis and treatment options, counseling and support as well as coordination of care with staff.  Provided a referral for Flash. Time also spent on entering orders and preparing documentation for the visit.       Alina Caceres M.D.  Certified in Addiction and Family Medicine  [facility]

## 2021-06-09 NOTE — PROGRESS NOTES
PRINCE 16 results neg    Patient was having a problem with insurance covering her Concerta in .    SI/HI thoughts: no    [place]  below:  [facility] Date: 17  [employer] Query Report Page#: 1  Patient Rx History Report  OPAL BAIG  Search Criteria: Last Name 'opal' and First Name 'magdalene' and  =  and Request Period =   to ' - 4 out of 4 Recipients Selected.  Fill Date Product, Str, Form Qty Days Pt ID Prescriber Written RX# N/R* Pharm MED+  ---------- -------------------------------- ------ ---- --------- ---------- ---------- ------------ ----- --------- ------  2017 LYRICA 200 MG CAPSULE 90.00 30 [id number] 2017 [id number] N [id number] 00.0  2017 ZOLPIDEM TARTRATE 10 MG TABLET 30.00 30  [id number] 2016 [id number] R [id number] 00.0  02/15/2017 METHYLPHENIDATE ER 54 MG TAB 30.00 30  [id number] 02/15/2017 [id number] N [id number] 00.0  2017 LYRICA 200 MG CAPSULE 90.00 30 [id number] 06/15/2016 [id number] R [id number] 00.0  2017 ZOLPIDEM TARTRATE 10 MG TABLET 30.00 30 [id number] 2016 [id number] R [id number] 00.0  2017 HYDROCODON-ACETAMINOPHEN 5-325 15.00 4 [id number] 2017 [id number] N [id number] 18.75  2017 METHYLPHENIDATE ER 54 MG TAB 30.00 30 [id number] 01/10/2017 [id number] N [id number] 00.0  2017 LYRICA 200 MG CAPSULE 90.00 30 [id number] 06/15/2016 [id number] R [id number] 00.0  2016 METHYLPHENIDATE ER 54 MG TAB 30.00 30 [id number] 2016 [id number] N [id number] 00.0  2016 ZOLPIDEM TARTRATE 10 MG TABLET 30.00 30 [id number] 2016 [id number] N [id number] 00.0  2016 LYRICA 200 MG CAPSULE 90.00 30 [id number] 06/15/2016 [id number] R [id number] 00.0  2016 METHYLPHENIDATE ER 54 MG TAB 30.00 30 [id number] 2016 [id number] N [id number] 00.0  2016 ZOLPIDEM TARTRATE 10 MG TABLET  30.00 30 [id number] 06/15/2016 [id number] R [id number] 00.0  11/07/2016 LYRICA 200 MG CAPSULE 90.00 30 [id number] 06/15/2016 [id number] R [id number] 00.0  10/27/2016 METHYLPHENIDATE ER 54 MG TAB 30.00 30 [id number] 10/26/2016 [id number] N [id number] 00.0  10/09/2016 ZOLPIDEM TARTRATE 10 MG TABLET 30.00 30 [id number] 06/15/2016 [id number] R [id number] 00.0  10/09/2016 LYRICA 200 MG CAPSULE 90.00 30 [id number] 06/15/2016 [id number] R [id number] 00.0  09/30/2016 METHYLPHENIDATE ER 54 MG TAB 30.00 30 [id number] 09/30/2016 [id number] N [id number] 00.0  09/11/2016 ZOLPIDEM TARTRATE 10 MG TABLET 30.00 30 [id number] 06/15/2016 [id number] R [id number] 00.0  09/11/2016 LYRICA 200 MG CAPSULE 90.00 30 [id number] 06/15/2016 [id number] N [id number] 00.0  09/07/2016 OXYCODONE-ACETAMINOPHEN 5-325 20.00 7 [id number] 09/07/2016 [id number] N [id number] 21.43  *N/R N=New R=Refill  +MED Daily  Prescribers for prescriptions listed  ----------------------------------------------------------------------------------------------------------------------------------  [id number] LYNDA BLANCO DDS; [address], [place] [address]  [id number] BRISSA YATES MD; [facility], [address] [place] [address]  Pharmacies that dispensed prescriptions listed  ----------------------------------------------------------------------------------------------------------------------------------  [id number] [facility] : [id number], [address], [place] [address],  Patients that match search criteria  -------------------------------------------------------------------------------------------------------------------------------    Correct pharmacy verified with patient and confirmed in snapshot? [x] yes []no    Medications Phoned  to Pharmacy [] yes [x]no  Name of Pharmacist:  List Medications, including dose, quantity and  instructions      Medication Prescriptions given to patient   [] yes  [x] no   List the name of the drug the prescription was written for.       Medications ordered this visit were e-scribed.  Verified by order class [x] yes  [] no    Medication changes or discontinuations were communicated to patient's pharmacy: [] yes  [x] no    UA collected [] yes    [x] no    [place] Prescription Monitoring Program Reviewed? [x] yes  [] no    Referrals were made to:  none    Future appointment was made: [x] yes  [] no    Dictation completed at time of chart check: [] yes  [x] no    I have checked the documentation for today s encounters and the above information has been reviewed and completed.

## 2021-06-10 NOTE — TELEPHONE ENCOUNTER
Refill Approved    Rx renewed per Medication Renewal Policy. Medication was last renewed on 6/2/20.    Ирина Naidu, [facility] Connection Triage/Med Refill 8/28/2020     Requested Prescriptions   Pending Prescriptions Disp Refills     traZODone (DESYREL) 100 MG tablet [Pharmacy Med Name: TRAZODONE 100MG TABLETS] 90 tablet 0     Sig: TAKE 1 TABLET BY MOUTH AT BEDTIME       Tricyclics/Misc Antidepressant/Antianxiety Meds Refill Protocol Passed - 8/25/2020 10:09 PM        Passed - PCP or prescribing provider visit in last year     Last office visit with prescriber/PCP: 10/22/2019 Avril Hernandez MD OR same dept: Visit date not found OR same specialty: 10/22/2019 Avril Hernandez MD  Last physical: 8/9/2018 Last MTM visit: Visit date not found   Next visit within 3 mo: Visit date not found  Next physical within 3 mo: Visit date not found  Prescriber OR PCP: Avril Hernandez MD  Last diagnosis associated with med order: 1. Primary insomnia  - traZODone (DESYREL) 100 MG tablet [Pharmacy Med Name: TRAZODONE 100MG TABLETS]; TAKE 1 TABLET BY MOUTH AT BEDTIME  Dispense: 90 tablet; Refill: 0    If protocol passes may refill for 12 months if within 3 months of last provider visit (or a total of 15 months).

## 2021-06-10 NOTE — TELEPHONE ENCOUNTER
Controlled Substance Refill Request  Medication Name:   Requested Prescriptions     Pending Prescriptions Disp Refills     methylphenidate HCl (CONCERTA) 54 MG CR tablet 30 tablet 0     Sig: One daily     Date Last Fill: 7/14/20  Requested Pharmacy: [facility]  Submit electronically to pharmacy  Controlled Substance Agreement on file:   Encounter-Level CSA Scan Date - 08/09/2018:    Scan on 8/13/2018 12:47 PM        Last office visit:  4/16/20

## 2021-06-11 NOTE — PROGRESS NOTES
Assessment/Plan:   1. Preop examination  - Potassium  - Hemoglobin  - Electrocardiogram Perform and Read  All normal    2. Age-related facial wrinkles  Reason for surgery       Patient approved for surgery with general or local anesthesia. Postoperative pain to be managed by surgeon during post-operative [employer] timeframe, typically 30-60 days for major surgery, and less for others. Proceed with proposed surgery without additional clinical clarifications.     Subjective:     Scheduled Procedure: Facial Lift  Surgery Date:  8/2/17  Surgery Location:  Facial Plastic Surgery  Surgeon:  Dr. Ras Spence Fax: [phone]    Current Outpatient Prescriptions   Medication Sig Dispense Refill     atorvastatin (LIPITOR) 20 MG tablet TAKE 1 TABLET (20 MG TOTAL) BY MOUTH DAILY. 30 tablet 10     carvedilol (COREG) 25 MG tablet TAKE 1 TABLET (25 MG TOTAL) BY MOUTH 2 (TWO) TIMES A DAY WITH MEALS. 180 tablet 2     furosemide (LASIX) 20 MG tablet Take 1 tablet (20 mg total) by mouth daily. 30 tablet 11     methylphenidate (CONCERTA) 54 MG CR tablet One daily 30 tablet 0     pregabalin (LYRICA) 200 MG capsule TAKE 1 CAPSULE BY MOUTH 3 TIMES A DAY. 90 capsule 5     traZODone (DESYREL) 100 MG tablet TAKE 1 TABLET AT BEDTIME AS NEEDED 30 tablet 5     zolpidem (AMBIEN) 10 mg tablet One at bedtime as needed for sleep, Dr Caceres providing additional refills 30 tablet 5     No current facility-administered medications for this visit.        No Known Allergies    Immunization History   Administered Date(s) Administered     Influenza, inj, historic 09/28/2011, 09/30/2012, 10/22/2014     Td, Adult, Absorbed 08/04/2006     Tdap 06/17/2016       Patient Active Problem List   Diagnosis     History of narcotic use     Alcohol dependence in remission     Cervical neuralgia     Hyperlipidemia     ADD (attention deficit disorder)     Mitral regurgitation     Chronic midline low back pain     Idiopathic cardiomyopathy     Left  carotid stenosis     Primary insomnia       Past Medical History:   Diagnosis Date     Anxiety      Back pain      Cardiomyopathy      CHF (congestive heart failure)      Depression      GI bleed 2010    per pt hx     Hypertension      Osteoporosis        Social History     Social History     Marital status:      Spouse name: N/A     Number of children: N/A     Years of education: N/A     Occupational History     respiratory therapist [employer]     Social History Main Topics     Smoking status: Never Smoker     Smokeless tobacco: Never Used     Alcohol use No      Comment: stop drinking in OCt 2004     Drug use: No     Sexual activity: Yes     Partners: Male     Other Topics Concern     Not on file     Social History Narrative       Past Surgical History:   Procedure Laterality Date     COLPOSCOPY       dental implant  2012     HIP ARTHROSCOPY W/ LABRAL REPAIR Right 2012     LAMINECTOMY AND MICRODISCECTOMY LUMBAR SPINE  12/17/2014    Procedure: Decpmpression Lumbar Minimally invasive once level; surgeon: Tripp Clifton MD; LOC: [facility]     OTHER SURGICAL HISTORY  2004    Pr Biopsy/Excise Cervical Lesion; Polyps     TUMOR REMOVAL  1987    Parotid Gland, Benign     Xr Mammo Bilateral Diagnostic (Ia)  03/2009       History of Present Illness  Recent Health  Fever: no  Chills: no  Fatigue: no  Chest Pain: no  Cough: no  Dyspnea: no  Urinary Frequency: no  Nausea: no  Vomiting: no  Diarrhea: no  Abdominal Pain: no  Easy Bruising: no  Lower Extremity Swelling: no  Poor Exercise Tolerance: no    Most recent Health Maintenance Visit:  6/22/17    Pertinent History  Prior Anesthesia: yes  Previous Anesthesia Reaction:  no  Diabetes: no  Cardiovascular Disease: yes - stable cardiomyopathy - followed by cardiology  Pulmonary Disease: no  Renal Disease: no  GI Disease: no  Sleep Apnea: no  Thromboembolic Problems: no  Clotting Disorder: no  Bleeding Disorder: no  Transfusion Reaction: no, never had a  transfusion  Impaired Immunity: no  Steroid use in the last 6 months: no  Frequent Aspirin use: no    Family history of no sudden death, no MI, no stroke, no anesthesia reaction    Social history of patient does not wear denture or partial plates and there is no transfusion refusal    After surgery, the patient plans to recover at home with family.    Review of Systems  Constitutional: negative for fevers and weight loss  Eyes: negative for irritation and visual disturbance  Ears, nose, mouth, throat, and face: negative for ear drainage, earaches and sore mouth  Respiratory: negative for cough and dyspnea on exertion  Cardiovascular: negative for chest pain and syncope  Gastrointestinal: negative for abdominal pain and change in bowel habits  Genitourinary:negative for dysuria and hesitancy  Integument/breast: negative for breast lump and breast tenderness  Hematologic/lymphatic: negative for bleeding and easy bruising  Musculoskeletal:positive for myalgias and controlled with medication, negative for arthralgias  Neurological: positive for paresthesia and as part of fibromyalgia, negative for coordination problems, dizziness and weakness  Behavioral/Psych: negative for depression  Endocrine: negative for temperature intolerance  Allergic/Immunologic: negative for hay fever          Objective:         Vitals:    07/13/17 1045   BP: 124/78   Pulse: 72   Resp: 16   Temp: 97.5  F (36.4  C)   TempSrc: Tympanic   SpO2: 98%   Weight: 109 lb (49.4 kg)   Height: 5' (1.524 m)       Physical Exam:  General Appearance: Alert, cooperative, no distress, appears stated age  Head: Normocephalic, without obvious abnormality, atraumatic  Eyes: PERRL, conjunctiva/corneas clear, EOM's intact  Ears: Normal TM's and external ear canals, both ears  Throat: Lips, mucosa, and tongue normal; teeth and gums normal  Neck: Supple, symmetrical, trachea midline, no adenopathy;  thyroid: not enlarged, symmetric, no tenderness/mass/nodules; no  carotid bruit or JVD  Lungs: Clear to auscultation bilaterally, respirations unlabored  Heart: Regular rate and rhythm, S1 and S2 normal, no murmur, rub, or gallop,   Abdomen: Soft, non-tender, bowel sounds active all four quadrants,  no masses, no organomegaly  Extremities: Extremities normal, atraumatic, no cyanosis or edema  Skin: Skin color, texture, turgor normal, no rashes or lesions  Lymph nodes: Cervical, supraclavicular nodes normal  Neurologic: Normal     .  Recent Results (from the past 240 hour(s))   Electrocardiogram Perform and Read   Result Value Ref Range    SYSTOLIC BLOOD PRESSURE  mmHg    DIASTOLIC BLOOD PRESSURE  mmHg    VENTRICULAR RATE 61 BPM    ATRIAL RATE 61 BPM    P-R INTERVAL 178 ms    QRS DURATION 90 ms    Q-T INTERVAL 430 ms    QTC CALCULATION (BEZET) 432 ms    P Axis 75 degrees    R AXIS 26 degrees    T AXIS 52 degrees    MUSE DIAGNOSIS       Normal sinus rhythm  Low voltage QRS  Borderline ECG  When compared with ECG of 24-NOV-2014 09:02,  Nonspecific T wave abnormality no longer evident in Lateral leads  Confirmed by MISSY RATLIFF, CHRISTOPHER LOC: ([id number]) on 7/13/2017 2:48:25 PM     Potassium   Result Value Ref Range    Potassium 4.4 3.5 - 5.0 mmol/L   Hemoglobin   Result Value Ref Range    Hemoglobin 13.5 12.0 - 16.0 g/dL

## 2021-06-11 NOTE — PROGRESS NOTES
"    Assessment and Plan    1. Routine general medical examination at a health care facility    2. Visit for screening mammogram  - Mammo Screening Bilateral; Future    3. Mixed hyperlipidemia  - Lipid Cascade    4. Idiopathic cardiomyopathy  - Basic Metabolic Panel    5. Depression in Remission   - well controlled, current PHQ-9=1      Counseling:  Preventive maintenance  Breast self exam  - NOT \"looking for lumps\" but getting to know what is your normal and being aware of changes    I am happy to take over prescriptions in the future.  Freddy will have one more visit with Dr. Caceres to wrap up.    Avril Hernandez MD    -----------------      Do you have any concerns today?    Freddy says \"I feel good\" though she intensely dislikes hate 12 hour shifts, she has no new complaints    Will be having plastic surgery later in the summer and will be scheduling a preop with  Me.  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  AT her most recent visit with her cardiologist, they decreased lasix from 40 to 20, saying that Freddy had recovered from it. She tried stopping lasix altogether at one point, but she says she feels better if she takes lasix    Depression - well controlled  Little interest or pleasure in doing things: Not at all  Feeling down, depressed, or hopeless: Not at all  Trouble falling or staying asleep, or sleeping too much: Not at all  Feeling tired or having little energy: Several days  Poor appetite or overeating: Not at all  Feeling bad about yourself - or that you are a failure or have let yourself or your family down: Not at all  Trouble concentrating on things, such as reading the " "newspaper or watching television: Not at all  Moving or speaking so slowly that other people could have noticed. Or the opposite - being so fidgety or restless that you have been moving around a lot more than usual: Not at all  Thoughts that you would be better off dead, or of hurting yourself in some way: Not at all  PHQ-9 Total Score: 1  If you checked off any problems, how difficult have these problems made it for you to do your work, take care of things at home, or get along with other people?: Not difficult at all     Lipids: Freddy watches her diet and is tolerating the atorvastatin I started her on last year    Freddy has a history of alcohol abuse  - in recovery now - and has seen Dr. Caceres in addiction medicine.  Dr. Caceres  fills her prescriptions for does lyrica (for pain) and  Trazodone, however every time she sees Dr Caceres there is a $300+ facility fee \"just for walking in the door;\"  She wonders if I might be able to take over prescribing. Lyrica - started out for back pain, but has bad fibromyalgia - lyrica helps those symptoms.  History of back surgery        Habits  Exercise: YOGA, walks 5 miles at work, outside walking  Diet: regular  Do you take any herbs or supplements that were not prescribed by a doctor? no  Are you taking calcium supplements? no yogurt  Are you taking aspirin daily? no  Do you wear seat belts? YES  Do you bike or ride a motorcycle? Do you wear a helmet? NA  Abuse screen:        History   Smoking Status     Never Smoker   Smokeless Tobacco     Never Used     History   Alcohol Use No     Comment: stop drinking in OCt 2004       Social:     History:  LMP: No LMP recorded. Patient is postmenopausal.  Menopause at 51? years  Last pap date: 16  Abnormal pap? no  : 2  Para: 2  Are you sexually active? yes  Do you need to use family planning? no  Last sti screen      Preventive  BP Readings from Last 3 Encounters:   17 124/66   17 139/64   17 " 123/68     Immunization History   Administered Date(s) Administered     Influenza, inj, historic 09/28/2011, 09/30/2012, 10/22/2014     Td, Adult, Absorbed 08/04/2006     Tdap 06/17/2016     Lab Results   Component Value Date    HGB 11.7 (L) 11/20/2014     Lab Results   Component Value Date    CHOL 158 06/22/2017    CHOL 264 (H) 06/17/2016     Lab Results   Component Value Date    HDL 60 06/22/2017    HDL 64 06/17/2016     Lab Results   Component Value Date    LDLCALC 83 06/22/2017    LDLCALC 182 (H) 06/17/2016     Lab Results   Component Value Date    TRIG 74 06/22/2017    TRIG 92 06/17/2016       Patient Active Problem List   Diagnosis     Overdose of antihypertensive agent     Overdose     Mood disorder in conditions classified elsewhere     Major depression in remission     History of narcotic use     Alcohol dependence in remission     Cervical neuralgia     Hyperlipidemia     ADD (attention deficit disorder)     Mitral regurgitation     Difficulty concentrating     Chronic midline low back pain     Idiopathic cardiomyopathy         Current Outpatient Prescriptions:      carvedilol (COREG) 25 MG tablet, Take 1 tablet (25 mg total) by mouth 2 (two) times a day with meals., Disp: 180 tablet, Rfl: 3     furosemide (LASIX) 20 MG tablet, Take 1 tablet (20 mg total) by mouth daily., Disp: 30 tablet, Rfl: 11     methylphenidate (CONCERTA) 54 MG CR tablet, Dr. Brunner covering for Dr. Caceres. 1 pill per day only., Disp: 30 tablet, Rfl: 0     pregabalin (LYRICA) 200 MG capsule, TAKE 1 CAPSULE BY MOUTH 3 TIMES A DAY., Disp: 90 capsule, Rfl: 5     traZODone (DESYREL) 100 MG tablet, TAKE 1 TABLET AT BEDTIME AS NEEDED, Disp: 30 tablet, Rfl: 0     zolpidem (AMBIEN) 10 mg tablet, One at bedtime as needed for sleep, Disp: 30 tablet, Rfl: 5      Wt Readings from Last 3 Encounters:   06/22/17 106 lb (48.1 kg)   06/12/17 107 lb (48.5 kg)   03/06/17 109 lb (49.4 kg)             Review of Systems    Do you have pain that bothers  you in your daily life? no    Constitutional: negative for weight change or recent illness  Eyes: negative for change in vision  Ears, nose, mouth, throat, and face: negative for sore throat and nasal drainage  Respiratory: negative for cough or dyspnea  Cardiovascular: negative for chest pain and palpitations  Gastrointestinal: negative for abdominal pain and change in bowel habits  Genitourinary:negative for dysuria  Breast: negative for lumps or pains  Integument: no rashes or lesions  Musculoskeletal:negative for arthralgias and myalgias  Neurological: negative for dizziness, headaches and paresthesia  Behavioral/Psych: negative for depression     Objective:     Vitals:    06/22/17 0956   BP: 124/66   Pulse: 80   Resp: 16   SpO2: 97%     Body mass index is 20.7 kg/(m^2).     General appearance: alert, appears stated age, cooperative and no distress  Head: Normocephalic, without obvious abnormality, atraumatic  Eyes: conjunctivae/corneas clear. PERRL, EOM's intact. Fundi benign.  Ears: normal TM's and external ear canals both ears  Throat: lips, mucosa, and tongue normal; teeth and gums normal and oropharynx clear  Neck: no adenopathy, no carotid bruit, no JVD, supple, symmetrical, trachea midline and thyroid not enlarged, symmetric, no tenderness/mass/nodules  Lungs: clear to auscultation bilaterally   Abdominal exam: soft, nontender, nondistended, no masses or organomegaly.  Breasts: normal appearance, no masses or tenderness  Heart: regular rate and rhythm, S1, S2 normal, no murmur, click, rub or gallop  Pelvic:   Extremities: extremities normal, atraumatic, no cyanosis or edema  Pulses: 2+ and symmetric  Skin: no rashes of worrisome lesions  Neurologic: Grossly normal   Psych: she is in a good mood, positive outlook on life, energetic

## 2021-06-11 NOTE — PROGRESS NOTES
Patient is here for follow up and medication management.      Patient states she is doing very well and would like to discuss discontinuing medication.    [place]  was reviewed prior to seeing patient today. See below for embedded report.  [employer] Information [place] Date: 17  [employer] Query Report Page#: 1  Patient Rx History Report  OPAL BAIG  Search Criteria: Last Name 'opal' and First Name 'magdalene' and  =  and Request Period =   to ' - 4 out of 4 Recipients Selected.  Fill Date Product, Str, Form Qty Days Pt ID Prescriber Written RX# N/R* Pharm **MED+  ---------- -------------------------------- ------ ---- --------- ---------- ---------- ------------ ----- --------- ------  2017 METHYLPHENIDATE ER 54 MG TAB 30.00 30 [id number] 2017 [id number] N [id number] 00.0  2017 ZOLPIDEM TARTRATE 10 MG TABLET 30.00 30 [id number] 2016 [id number] R [id number] 00.0  2017 LYRICA 200 MG CAPSULE 90.00 30 [id number] 2017 [id number] R [id number] 00.0  2017 METHYLPHENIDATE ER 54 MG TAB 30.00 30 [id number] 2017 [id number] N [id number] 00.0  2017 ZOLPIDEM TARTRATE 10 MG TABLET 30.00 30 [id number] 2016 [id number] R [id number] 00.0  2017 LYRICA 200 MG CAPSULE 90.00 30 [id number] 2017 [id number] R [id number] 00.0  04/10/2017 METHYLPHENIDATE ER 54 MG TAB 30.00 30 [id number] 04/10/2017 [id number] N [id number] 00.0  2017 LYRICA 200 MG CAPSULE 90.00 30 [id number] 2017 [id number] R [id number] 00.0  2017 ZOLPIDEM TARTRATE 10 MG TABLET 30.00 30 [id number] 2016 [id number] R [id number] 00.0  03/15/2017 OXYCODONE-ACETAMINOPHEN 5-325 20.00 5 [id number] 03/15/2017 [id number] N [id number] 30.0  2017 METHYLPHENIDATE ER 54 MG TAB 30.00 30 [id number] 2017 [id number] N [id number] 00.0  2017 LYRICA 200 MG CAPSULE 90.00 30 [id number]  [id number] 02/21/2017 [id number] N [id number] 00.0  02/22/2017 ZOLPIDEM TARTRATE 10 MG TABLET 30.00 30 [id number] 12/19/2016 [id number] R [id number] 00.0  02/15/2017 METHYLPHENIDATE ER 54 MG TAB 30.00 30 [id number] 02/15/2017 [id number] N [id number] 00.0  01/29/2017 LYRICA 200 MG CAPSULE 90.00 30 [id number] 06/15/2016 [id number] R [id number] 00.0  01/23/2017 ZOLPIDEM TARTRATE 10 MG TABLET 30.00 30 [id number] 12/19/2016 [id number] R [id number] 00.0  01/17/2017 HYDROCODON-ACETAMINOPHEN 5-325 15.00 4 [id number] 01/17/2017 [id number] N [id number] 18.75  01/17/2017 METHYLPHENIDATE ER 54 MG TAB 30.00 30 [id number] 01/10/2017 [id number] N [id number] 00.0  01/02/2017 LYRICA 200 MG CAPSULE 90.00 30 [id number] 06/15/2016 [id number] R [id number] 00.0  12/20/2016 METHYLPHENIDATE ER 54 MG TAB 30.00 30 [id number] 12/19/2016 [id number] N [id number] 00.0  12/19/2016 ZOLPIDEM TARTRATE 10 MG TABLET 30.00 30 [id number] 12/19/2016 [id number] N [id number] 00.0  *N/R N=New R=Refill  +MED Daily  Prescribers for prescriptions listed  ----------------------------------------------------------------------------------------------------------------------------------  [id number] BRISSA YATES MD; [employer] [facility], [address] [place] [id number]  [id number] LYNDA BLANCO DDS; [address], [place] [address]  [id number] BRUNNER, EMILY A MD; [facility], [address],, [place] [address]  Pharmacies that dispensed prescriptions listed  ----------------------------------------------------------------------------------------------------------------------------------  [id number] [facility] : [id number], [id number]    Correct pharmacy verified with patient and confirmed in snapshot? [x] yes []no    Medications Phoned  to Pharmacy [] yes [x]no  Name of Pharmacist:  List Medications, including dose, quantity and  instructions      Medication Prescriptions given to patient   [] yes  [x] no   List the name of the drug the prescription was written for.       Medications ordered this visit were e-scribed.  Verified by order class [x] yes  [] no    Medication changes or discontinuations were communicated to patient's pharmacy: [] yes  [x] no    UA collected [] yes    [x] no    [place] Prescription Monitoring [place] Reviewed? [x] yes  [] no    Referrals were made to:  none    Future appointment was made: [x] yes  [] no    Dictation completed at time of chart check: [] yes  [x] no    I have checked the documentation for today s encounters and the above information has been reviewed and completed.

## 2021-06-11 NOTE — TELEPHONE ENCOUNTER
Controlled Substance Refill Request  Medication Name:   Requested Prescriptions     Pending Prescriptions Disp Refills     methylphenidate HCl (CONCERTA) 54 MG CR tablet 30 tablet 0     Sig: One daily     Date Last Fill: 8/13/20  [facility]ed [facility]: [facility]  Submit electronically to pharmacy  Controlled Substance Agreement on file:   Encounter-Level CSA Scan Date - 08/09/2018:    Scan on 8/13/2018 12:47 PM        Last office visit:  4/16/20

## 2021-06-11 NOTE — PROGRESS NOTES
Mental Health and Addiction Medicine  Outpatient Subsequent Visit  By Alina Caceres M.D.    6/12/2017    Cindy Beltrán, 1955.    This note was created by undersigned using a dictation system. All typing errors or contextual distortion is unintentional and software inherent.    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  Discussed possibioity that if she has pain with decreasing dose resumption does not always give the same effect    Cervical neuralgia-trial of slow decrease in mid day dose buy opening capsule.  Discussed possibioity that if she has pain with decreasing dose resumption does not always give the same effect    Follow-up-6 montths    Significant Interval History:   Cindy Beltrán is doing very well, still working 2 12 hour shifts weekly at [employer].  Remains sober, attending meetings, no depression or anxiety. Wonders about decreasing her lyrica.  Feels very good physically no pain. Adderall works well, sleeping consistently with 10mg zolpidem without any nighttime events, aware that this is double the current recommended dose for women. Weight is normal and stable    Current Stressors: none    Addiction History  This is substantially documented elsewhere.  The patient denied recent substance use.    Recovery Environment:  This is substantially documented elsewhere.    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 30 tablet 11     carvedilol (COREG) 25 MG tablet Take 1 tablet (25 mg total) by mouth 2 (two) times a day with meals. 180 tablet 3     furosemide (LASIX) 20 MG tablet Take 1 tablet (20 mg total) by mouth daily. 30 tablet 11     methylphenidate (CONCERTA) 54 MG CR tablet Dr. Brunner covering for Dr. Caceres. 1 pill per day only. 30 tablet 0     pregabalin (LYRICA) 200 MG capsule TAKE 1 CAPSULE BY MOUTH 3 TIMES A DAY. 90 capsule 5     traZODone (DESYREL) 100 MG tablet TAKE 1 TABLET AT BEDTIME AS NEEDED 30 tablet 0     zolpidem (AMBIEN) 10 mg tablet One at bedtime as needed for sleep 30 tablet 5     No current facility-administered medications for this visit.         Medication Side Effects:  None    Clinincal Outcome Measures:  PHQ-9   0  MIN-7   0      [place] Prescription Monitoring [place]:  No worrisome pharmacy activity.  Recorded in the chart    Objective:     Vitals:    06/12/17 1055   BP: 139/64   Pulse: 86   Temp: 98.7  F (37.1  C)   Weight: 107 lb (48.5 kg)   Height: 5' (1.524 m)   PainSc: 0-No pain     Body mass index is 20.9 kg/(m^2).    General appearance: normal   Level of consciousness: alert   Gait/Station: Normal   Muscle Strength/Tone: No gross abnormalities   Postural tremor in the upper extremities: None   Cogwheel rigidity at the elbow or wrists:absent   Point-to-point maneuvers: normal   Abnormal motor movements:None  noted     Psychiatric Mental Status Examination   Appearance/Grooming, dress and hygiene:Normal   Consciousness/Orientation: Normal, A+O to name, date, place and situation   Behavior/Attitude:Cooperative   Mood: Euthymic:  Friendly and Pleasant  Affect:   Range -> Full   Intensity -> normal   Eye contact: within normal limits   Speech: Normal   Thought processes: Normal  Thought content:Normal, denies suicidal homicidal ideation, denies auditory and visual hallucinations  Perceptions:Normal  Insight and judgement: Recognition of illness, Taking steps to manage illness and Readiness to change has been steadily good  Memory-Recent: Normal   Memory-Remote: Normal   Attention/Concentration: Normal   Language: Normal   Fund of Knowledge: average for current situation    Summary of Diagnostic Studies:    Review indicates:  Obtain u-tox at PCP  Urine toxicology was deferred secondary to substantial out-of-pocket costs.    Discharge Planning:    Reviewed risks/benefits of medication and Patient able to verbalize understanding of side effects   Doing well may try decreasing mid day dose of Lyrica but opening capsule and using half at a time    Total Time:   15  minutes                 Coordination of Care:     15   minutes spent on this visit with more than 50% time spent face to face in education about diagnosis and treatment options, counseling and support as well as coordination of care with staff.  Provided a referral for u-tox at PCP. Time also spent on entering orders and preparing documentation for the visit.       Alina Caceres M.D.  Certified in Addiction and Family Medicine  [facility]

## 2021-06-12 ENCOUNTER — HEALTH MAINTENANCE LETTER (OUTPATIENT)
Age: 66
End: 2021-06-12

## 2021-06-12 NOTE — TELEPHONE ENCOUNTER
Controlled Substance Refill Request  Medication Name:   Requested Prescriptions     Pending Prescriptions Disp Refills     methylphenidate HCl (CONCERTA) 54 MG CR tablet 30 tablet 0     Sig: One daily     Date Last Fill: 9/18/20  [facility]ed [facility]acy: [facility]  Submit electronically to pharmacy  Controlled Substance Agreement on file:   Encounter-Level CSA Scan Date - 08/09/2018:    Scan on 8/13/2018 12:47 PM        Last office visit:  4/16/20  Maegan Abdullahi RN, MA  [facility]    Triage Nurse Advisor

## 2021-06-13 NOTE — PROGRESS NOTES
Provider E-Visit time total (minutes): 7    My note sent to patient:    How much sleep do you get on a typical night?    Do you have any trouble sleeping?    Do use drink caffeinated beverages , and if so how many in a day on average?    Are having any heart palpitations?    Are you having any decreased appetite?    Have you been losing any weight?    What does your medication help you do at work and at home?    Are there tasks you have trouble with despite taking medication?    -----    Her reply:  Good morning Dr Hernandez!     I generally sleep 8 hours per night without interruption!   I have 2-4 cups of coffee a day- no other caffeine beverages.   Rare palpitations noted.   No changes in my appetite and my weight is stable.   I find that the medication really helps in starting and completing tasks.  I am able to concentrate better therefore not starting multiple tasks and completing few!!   I don t feel there are any new issues with my current regime!     ---  Assessment and Plan   1. Attention deficit disorder (ADD) without hyperactivity    PLAN: continue monthly  prescriptions for methylphenidate 54 mg CR.  In person follow up, controlled substance agreement, and urine testing due in 6 months

## 2021-06-13 NOTE — TELEPHONE ENCOUNTER
Refill request for Methylphenidate   Last refill 10/19/2020  Unable to pull   Last ofv 4/16/2020  Due for visit

## 2021-06-13 NOTE — TELEPHONE ENCOUNTER
Patient Returning Call  Reason for call:  Returning clinic call.  Information relayed to patient:  The patient received the message about needing to make an appointment.  Patient is confused in that she had an E visit with her Dr on  11/9/20, and had done the urine check on 10/28/20  Patient has additional questions:  Yes  If YES, what are your questions/concerns:  Does she really need to be seen again with the last visit last month?  Please advise.  Okay to leave a detailed message?: Yes

## 2021-06-13 NOTE — TELEPHONE ENCOUNTER
Controlled Substance Refill Request  Medication Name:   Requested Prescriptions     Pending Prescriptions Disp Refills     methylphenidate HCl (CONCERTA) 54 MG CR tablet 30 tablet 0     Sig: One daily     Date Last Fill: 10/19/20  [facility]ed [facility]  Submit electronically to pharmacy  Controlled Substance Agreement on file:   Encounter-Level CSA Scan Date - 08/09/2018:    Scan on 8/13/2018 12:47 PM        Last office visit:  11/9/20

## 2021-06-14 NOTE — TELEPHONE ENCOUNTER
Controlled Substance Refill Request  Medication Name:   Requested Prescriptions     Pending Prescriptions Disp Refills     methylphenidate HCl (CONCERTA) 54 MG CR tablet 30 tablet 0     Sig: One daily     Date Last Fill: 12/8/20  [facility]ed [facility]  Submit electronically to pharmacy  Controlled Substance Agreement on file:   Encounter-Level CSA Scan Date - 08/09/2018:    Scan on 8/13/2018 12:47 PM        Last office visit:  11/9/20

## 2021-06-14 NOTE — TELEPHONE ENCOUNTER
Controlled Substance Refill Request  Medication Name:   Requested Prescriptions     Pending Prescriptions Disp Refills     zolpidem (AMBIEN) 10 mg tablet 90 tablet 0     Sig: TAKE 1 TABLET(10 MG) BY MOUTH AT BEDTIME     Date Last Fill: 10/12/20  Requested [facility]  Submit electronically to pharmacy  Controlled Substance Agreement on file:   Encounter-Level CSA Scan Date - 08/09/2018:    Scan on 8/13/2018 12:47 PM        Last office visit:  11/9/20

## 2021-06-14 NOTE — TELEPHONE ENCOUNTER
Controlled Substance Refill Request  Medication Name:   Requested Prescriptions     Pending Prescriptions Disp Refills     pregabalin (LYRICA) 200 MG capsule [Pharmacy Med Name: PREGABALIN 200MG CAPSULES] 90 capsule 0     Sig: TAKE 1 CAPSULE BY MOUTH THREE TIMES DAILY     Date Last Fill: 10/1/20  [facility]ed [facility]: [facility]  Submit electronically to pharmacy  Controlled Substance Agreement on file:   Encounter-Level CSA Scan Date - 08/09/2018:    Scan on 8/13/2018 12:47 PM        Last office visit:  11/9/20

## 2021-06-14 NOTE — TELEPHONE ENCOUNTER
BP Readings from Last 3 Encounters:   10/22/19 130/70   02/21/19 128/72   01/22/19 150/76         Visit with Dr. Jimenez 4/19/20    Assessment/Plan:  1.  Calcified coronary atherosclerosis: The patient had no chest pain or shortness of breath.  She had a coronary CT angiogram in 2016 which was reported minimal coronary artery disease.  Continue risk of factor control.     2.  Chronic diastolic congestive heart failure: The patient is compensated well.  No signs of fluid retention.  Continue Lasix 20 mg as needed.     3.  Hyperlipidemia: Continue Lipitor 20 mg at bedtime.  Her LDL was controlled.     4.  Minimal carotid artery stenosis: Observe it.     Follow Up Plan: 12 months

## 2021-06-14 NOTE — TELEPHONE ENCOUNTER
Controlled Substance Refill Request  Medication Name:   Requested Prescriptions     Pending Prescriptions Disp Refills     methylphenidate HCl (CONCERTA) 54 MG CR tablet 30 tablet 0     Sig: One daily     Date Last Fill: 1/6/21  Requested [facility]: [facility]  Submit electronically to pharmacy  Controlled Substance Agreement on file:   Encounter-Level CSA Scan Date - 08/09/2018:    Scan on 8/13/2018 12:47 PM        Last office visit:  11/9/20

## 2021-06-14 NOTE — TELEPHONE ENCOUNTER
Controlled Substance Refill Request  Medication Name:   Requested Prescriptions     Pending Prescriptions Disp Refills     zolpidem (AMBIEN) 10 mg tablet 90 tablet 0     Sig: TAKE 1 TABLET(10 MG) BY MOUTH AT BEDTIME     Date Last Fill: 10/12/2020  Requested Pharmacy: [id number] [place], [place]  Submit electronically to pharmacy  Controlled Substance Agreement on file:   Encounter-Level CSA Scan Date - 08/09/2018:    Scan on 8/13/2018 12:47 PM        Last office visit:  04/16/2020 with PCP

## 2021-06-14 NOTE — TELEPHONE ENCOUNTER
Disp Refills Start End MITCH   zolpidem (AMBIEN) 10 mg tablet 90 tablet 0 1/8/2021  No   Sig: TAKE 1 TABLET(10 MG) BY MOUTH AT BEDTIME   Sent to pharmacy as: zolpidem 10 mg tablet (AMBIEN)   E-Prescribing Status: Receipt confirmed by pharmacy (1/8/2021  5:25 PM CST)     Rx sent previously, declined in this encounter

## 2021-06-14 NOTE — TELEPHONE ENCOUNTER
RN cannot approve Refill Request    RN can NOT refill this medication PCP messaged that patient is overdue for Office Visit. Last office visit: 10/22/2019 Avril Hernandez MD Last Physical: 8/9/2018 Last MTM visit: Visit date not found Last visit same specialty: 10/22/2019 Avril Hernandez MD.  Next visit within 3 mo: Visit date not found  Next physical within 3 mo: Visit date not found      Maegan Abdullahi, [facility] Triage/Med Refill 12/26/2020    Requested Prescriptions   Pending Prescriptions Disp Refills     carvediloL (COREG) 25 MG tablet [Pharmacy Med Name: CARVEDILOL 25MG TABLETS] 90 tablet 1     Sig: TAKE 1 TABLET(25 MG) BY MOUTH AT BEDTIME       Beta-Blockers Refill Protocol Failed - 12/26/2020  3:55 AM        Failed - Blood pressure filed in past 12 months     BP Readings from Last 1 Encounters:   10/22/19 130/70             Passed - PCP or prescribing provider visit in past 12 months or next 3 months     Last office visit with prescriber/PCP: 10/22/2019 Avril Hernandez MD OR same dept: Visit date not found OR same specialty: 10/22/2019 Avril Hernandez MD  Last physical: 8/9/2018 Last MTM visit: Visit date not found   Next visit within 3 mo: Visit date not found  Next physical within 3 mo: Visit date not found  Prescriber OR PCP: Avril Hernandez MD  Last diagnosis associated with med order: 1. Cardiomyopathy (H)  - carvediloL (COREG) 25 MG tablet [Pharmacy Med Name: CARVEDILOL 25MG TABLETS]; TAKE 1 TABLET(25 MG) BY MOUTH AT BEDTIME  Dispense: 90 tablet; Refill: 1    If protocol passes may refill for 12 months if within 3 months of last provider visit (or a total of 15 months).

## 2021-06-14 NOTE — TELEPHONE ENCOUNTER
Controlled Substance Refill Request  Medication Name:   Requested Prescriptions     Pending Prescriptions Disp Refills     pregabalin (LYRICA) 200 MG capsule [Pharmacy Med Name: PREGABALIN 200MG CAPSULES] 90 capsule 0     Sig: TAKE 1 CAPSULE BY MOUTH THREE TIMES DAILY     Date Last Fill: 12/28/20  [facility]ed [facility]  Submit electronically to pharmacy  Controlled Substance Agreement on file:   Encounter-Level CSA Scan Date - 08/09/2018:    Scan on 8/13/2018 12:47 PM        Last office visit:  11/9/20

## 2021-06-15 NOTE — PROGRESS NOTES
Mental Health and Addiction Medicine  Outpatient Subsequent Visit  By Alina Caceres M.D.    1/12/2018    Cindy Beltrán, 1955.      Chief Complaint:  Patient was seen today in follow-up for  Chief Complaint   Patient presents with     Follow-up     Medication Management     As well as chronic pain including neuropathic pain , ADD, chronic insomnia.    Impression/Medical Decision-Making:  Diagnoses/Plan:    Alcohol dependence in remission  -     DAM    (Drug Abuse 1+, Urine); Standing, not collected today due to expense    Psychophysiological insomnia  -     zolpidem (AMBIEN) 10 mg tablet; One at bedtime as needed for sleep, Dr Caceres providing additional refills      traZODone (DESYREL) 100 MG tablet; TAKE 1 TABLET AT BEDTIME AS NEEDED    Other chronic pain, Cervical neuralgia, No lost prescriptions or requests for early refills and this is confirmed by her [place] prescription monitoring report  -     Pregabalin (LYRICA) 200 MG capsule; TAKE 1 CAPSULE BY MOUTH 3 TIMES A DAY.  History of narcotic use  -     DAM    (Drug Abuse 1+, Urine); Standing    ADD (attention deficit disorder) without hyperactivity  -     methylphenidate HCl (CONCERTA) 54 MG CR tablet; One daily  No lost prescriptions or requests for early refills and this is confirmed by her [place] prescription monitoring report.  Provided with printed prescriptions today because she will need to find a new pharmacy as [email or web address] is not allowing its customers to use [facility] this year.  Instructed to present her prescriptions for 5 days before her refill if necessary as we will likely have to do new prior authorizations for Lyrica and possibly methylphenidate through [facility]    Significant Interval History:   Cindy Beltrán presents for follow-up.  States that her insurance has changed she has a [facility] policy now that has a very high deductible and actually does not cover visits here to [facility].  She feels like she wants  to continue to come regardless.  She is feeling really very good.is not having pain as long she takes her Lyrica she started back doing yoga and feels just very grateful to have her life back and be herself.  54 mg of extended release methylphenidate treat her ADD symptoms and anxiety.  She sleeps well with 10 mg of zolpidem, this has not changed, no intolerance, no nighttime amnestic episodes.  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  She has been sober for a long time now she attends a weekly AA meeting she has a sponsor that she speaks to infrequently.  Most of her support system i.e. friendship [place] are also in recovery.  Urine toxicology screens are very expensive when done here at the hospital.  She got connected with [facility] and has sent u tox screens from there but she plans to find [facility] for primary care which she needs infrequently.  Will arrange for u tox screen there when she has made a decision.  Also has to choose new pharmacy.    Current Stressors: other psychosocial or environmental problems and problems with access to health care services    Addiction History:  This is substantially documented elsewhere.  The patient denied recent substance use.    Recovery Environment:  This is substantially documented elsewhere.    Active Problem List:  Patient Active Problem List   Diagnosis     History of narcotic use     Alcohol dependence in remission     Cervical neuralgia     Hyperlipidemia     ADD (attention deficit disorder)     Mitral regurgitation     Chronic midline low back pain     Idiopathic cardiomyopathy     Left carotid stenosis     Primary insomnia     Mixed hyperlipidemia     Coronary atherosclerosis due to calcified coronary lesion     Chronic diastolic CHF (congestive heart failure), NYHA class 2       Allergies as of 01/12/2018     (No Known Allergies)       Current Outpatient Prescriptions   Medication Sig Dispense  Refill     aspirin 81 MG EC tablet Take 81 mg by mouth daily.       atorvastatin (LIPITOR) 20 MG tablet TAKE 1 TABLET (20 MG TOTAL) BY MOUTH DAILY. 30 tablet 10     carvedilol (COREG) 25 MG tablet TAKE 1 TABLET (25 MG TOTAL) BY MOUTH 2 (TWO) TIMES A DAY WITH MEALS. 180 tablet 2     furosemide (LASIX) 20 MG tablet TAKE 1 TABLET (20 MG TOTAL) BY MOUTH DAILY. 90 tablet 2     methylphenidate HCl (CONCERTA) 54 MG CR tablet One daily 30 tablet 0     pregabalin (LYRICA) 200 MG capsule TAKE 1 CAPSULE BY MOUTH 3 TIMES A DAY. 90 capsule 5     [START ON 1/2/2019] traZODone (DESYREL) 100 MG tablet TAKE 1 TABLET AT BEDTIME AS NEEDED 30 tablet 5     zolpidem (AMBIEN) 10 mg tablet One at bedtime as needed for sleep, Dr Caceres providing additional refills 30 tablet 5     No current facility-administered medications for this visit.        Medication Side Effects:  Dry mouth    Clinincal Outcome Measures:  PHQ-9   0  MIN-7   1    Review of Systems: Positive findings are noted below or elsewhere in this record, otherwise a 10 point review of systems is negative.  This is substantially documented elsewhere.   See 5 page clinical history under Media tab      [place] Pres[place] Monitoring [place]:  No worrisome pharmacy activity.  Recorded in the chart    Objective:     Vitals:    01/12/18 1139   BP: 138/76   Pulse: 79   Weight: 108 lb (49 kg)   Height: 5' (1.524 m)     Body mass index is 21.09 kg/(m^2).    General appearance: normal   Level of consciousness: alert   Gait/Station: Normal   Muscle Strength/Tone: No gross abnormalities   Postural tremor in the upper extremities: None   Cogwheel rigidity at the elbow or wrists:absent   Point-to-point maneuvers: normal   Abnormal motor movements:None noted     Psychiatric Mental Status Examination   Appearance/Grooming, dress and hygiene:Normal   Consciousness/Orientation: Normal, A+O to name, date, place and situation   Behavior/Attitude:Cooperative   Mood: Euthymic:  Friendly,  Pleasant and Thankful  Affect:   Range -> Full   Intensity -> normal   Eye contact: within normal limits   Speech: Normal   Thought processes: Normal  Thought content:Normal, denies suicidal homicidal ideation, denies auditory and visual hallucinations  Perceptions:Normal  Insight and judgement: Recognition of illness, Taking steps to manage illness and Readiness to change has been excellent as it relates to alternative management of chronic pain as well as sobriety  Memory-Recent: Normal   Memory-Remote: Normal   Attention/Concentration: Normal   Language: Normal   Fund of Knowledge: average for current situation    Summary of Diagnostic Studies:    Review indicates:  Urine toxicology was deferred secondary to substantial out-of-pocket costs.    Discharge Planning:    Reviewed risks/benefits of medication and Patient able to verbalize understanding of side effects   Let us know what pharmacy you chose    Total Time:   20 minutes                Coordination of Care:    20    minutes spent on this visit with more than 50% time spent face to face in education about diagnosis and treatment options, counseling and support as well as coordination of care with staff.   Time also spent on entering orders and preparing documentation for the visit.       Alina Caceres M.D.  Certified in Addiction and Family Medicine  [facility]

## 2021-06-15 NOTE — TELEPHONE ENCOUNTER
Controlled Substance Refill Request  Medication Name:   Requested Prescriptions     Pending Prescriptions Disp Refills     pregabalin (LYRICA) 200 MG capsule 90 capsule 0     Sig: Take 1 capsule (200 mg total) by mouth 3 (three) times a day.     Date Last Fill: 2/23/21  Requested [facility]acy: [facility]  Submit electronically to pharmacy  Controlled Substance Agreement on file:   Encounter-Level CSA Scan Date - 08/09/2018:    Scan on 8/13/2018 12:47 PM        Last office visit:  4/16/20

## 2021-06-15 NOTE — TELEPHONE ENCOUNTER
Controlled Substance Refill Request  Medication Name:   Requested Prescriptions     Pending Prescriptions Disp Refills     pregabalin (LYRICA) 200 MG capsule [Pharmacy Med Name: PREGABALIN 200MG CAPSULES] 90 capsule 0     Sig: TAKE 1 CAPSULE BY MOUTH THREE TIMES DAILY     Date Last Fill: 1/25/21  Requested [facility]acy: [facility]  Submit electronically to pharmacy  Controlled Substance Agreement on file:   Encounter-Level CSA Scan Date - 08/09/2018:    Scan on 8/13/2018 12:47 PM        Last office visit:  11/9/20  Maegan Abdullahi RN, MA  [facility]    Triage Nurse Advisor

## 2021-06-15 NOTE — PROGRESS NOTES
FEMALE PREVENTATIVE EXAM    Assessment and Plan:     Patient has been advised of split billing requirements and indicates understanding: Yes   controlled chronic issues with no change in management or complex decision making today     1. Routine general medical examination at a health care facility  Well woman    2. ADD (attention deficit disorder) without hyperactivity  - methylphenidate HCl 54 MG CR tablet; Take 1 tablet (54 mg total) by mouth daily.  Dispense: 30 tablet; Refill: 0  follow up by erwint in 6 months    3. Encounter for screening mammogram for breast cancer  - Mammo Screening Bilateral; Future    4. Papanicolaou smear for cervical cancer screening  - Gynecologic Cytology (PAP Smear)        Next follow up:  Return in about 6 months (around 9/2/2021) for follow up, can be via E-Visit.    Immunization Review  Adult Imm Review: Due for penumococcal, but waiting on Covid19 vaccine      Avril Hernandez MD      Subjective:   Chief Complaint: Cindy Beltrán is an 65 y.o. female here for a preventative health visit.    Patient has been advised of split billing requirements and indicates understanding: Yes  HPI:    How are you doing during this  CoVid19 pandemic?   Pandemic is isolating. Her children are in [place] - hasn't seen them for a year.  She is retired and her  is mostly working from home. She hand her  got a nordic tract - walking 2 miles or more a day, building muscle and eating well     They did get a Yorkie puppy  - Koko Whittaker did before but now does understand how much people love their dogs!    She and her  tested positive within a few days of getting their puppy, and so did a friend who came with them - since they have been extremely careful, wondered if they got it when picking up their puppy. She did  not have a bad case but it lingered on a bit.     Her  who works at a metal health clinic already got his two doses of Covid19 vaccine    ADHD - continues  to do well on Adderall. Though she is not working, she finds this continues to make he day easier.  She has lost 3 pounds, but her diet and appetite is good and she eats less when she is stressed      Wt Readings from Last 3 Encounters:   03/02/21 107 lb 1.6 oz (48.6 kg)   04/29/20 110 lb (49.9 kg)   10/22/19 112 lb (50.8 kg)     Pulse Readings from Last 3 Encounters:   03/02/21 83   10/22/19 88   02/21/19 74     Diastolic Heart Failure - sees Dr. Jimenez yearly    Healthy Habits  Are you taking a daily aspirin? Yes  Do you typically exercising at least 40 min, 3-4 times per week?  Yes  Do you usually eat at least 4 servings of fruit and vegetables a day, include whole grains and fiber and avoid regularly eating high fat foods? Yes  Have you had an eye exam in the past two years? Yes  Do you see a dentist twice per year? Yes  Do you have any concerns regarding sleep? No    Safety Screen  If you own firearms, are they secured in a locked gun cabinet or with trigger locks? The patient does not own any firearms  Do you feel you are safe where you are living?: Yes (3/2/2021  2:40 PM)  Do you feel you are safe in your relationship(s)?: Yes (3/2/2021  2:40 PM)      Review of Systems:    Constitutional: negative for recent illness or change in weight  Eyes: negative for irritation and vision change  Ears, nose, mouth, throat, and face: negative for nasal congestion and sore throat  Respiratory: negative for cough and dyspnea on exertion  Cardiovascular: negative for chest pain and palpitations  Gastrointestinal: negative for abdominal pain and change in bowel habits  Genitourinary:negative for dysuria, frequency and hesitancy  Integument/breast: negative for rash  Hematologic/lymphatic: negative for bleeding and easy bruising  Musculoskeletal:negative for arthralgias and myalgias  Neurological: negative for dizziness, headaches and paresthesia        Cancer Screening       Status Date      MAMMOGRAM Overdue 2/15/2020      Done  "2/15/2019 MAMMO DIAGNOSTIC BILATERAL     Patient has more history with this topic...    PAP SMEAR Next Due 6/17/2021      Done 6/17/2016 GYNECOLOGIC CYTOLOGY (PAP SMEAR)     Patient has more history with this topic...            History     Reviewed By Date/Time Sections Reviewed    Efrain Rosenthal, LUCY 3/2/2021  2:42 PM Tobacco            Objective:   Vital Signs:   Visit Vitals  /76 (Patient Site: Left Arm, Patient Position: Sitting, Cuff Size: Adult Regular)   Pulse 83   Temp 98.6  F (37  C) (Tympanic)   Ht 5' 3.09\" (1.602 m)   Wt 107 lb 1.6 oz (48.6 kg)   SpO2 100%   BMI 18.92 kg/m           PHYSICAL EXAM  General appearance - alert, well appearing, and in no distress  Mental status - normal mood, behavior, speech, dress, motor activity, and thought processes  Eyes - pupils equal and reactive, extraocular eye movements intact, funduscopic exam normal, discs flat and sharp  Ears - bilateral TM's and external ear canals normal  Mouth - mucous membranes moist, pharynx normal without lesions  Neck - supple, no significant adenopathy, carotids upstroke normal bilaterally, no bruits, thyroid exam: thyroid is normal in size without nodules or tenderness  Chest - clear to auscultation, no wheezes, rales or rhonchi, symmetric air entry  Heart - normal rate, regular rhythm, normal S1, S2, no murmurs, rubs, clicks or gallops  Abdomen - soft, nontender, nondistended, no masses or organomegaly  Breasts - breasts appear normal, no suspicious masses, no skin or nipple changes or axillary nodes  Neurological - alert, oriented, normal speech, no focal findings or movement disorder noted, DTR's normal and symmetric  Extremities - peripheral pulses normal, no pedal edema, no clubbing or cyanosis  Skin - no rashes or worrisome lesions      "

## 2021-06-15 NOTE — PROGRESS NOTES
Pt here for follow up and medication management     [facility] 17 results negative     Feeling a lot better since starting on Lyrica    Depression PHQ-9 Score 0 not difficult at all  Anxiety MIN-7 Score 1 not difficult at all  Sleep improved   Appetite normal     SI/HI thoughts: Denies at this time     [place]  [employer] [place] Date: 01/10/18  Query Report Page#: 1  Patient Rx History Report  OPAL [facility]  Search Criteria: Last Name 'opal' and First Name 'magdalene' and  = ' and Request Period =   to '01/10/18' - 4 out of 4 Recipients Selected.  Fill Date Product, Str, Form Qty Days Pt ID Prescriber Written RX# N/R* Pharm **MED+  ---------- -------------------------------- ------ ---- --------- ---------- ---------- ------------ ----- --------- ------  2018 LYRICA 200 MG CAPSULE 90.00 30 [id number] 2017 [id number] R [id number] 00.0  2017 METHYLPHENIDATE ER 54 MG TAB 30.00 30 [id number] 2017 [id number] N [id number] 00.0  2017 LYRICA 200 MG CAPSULE 90.00 30 [id number] 2017 [id number] R [id number] 00.0  2017 ZOLPIDEM TARTRATE 10 MG TABLET 30.00 30 [id number] 2017 [id number] N [id number] 00.0  2017 METHYLPHENIDATE ER 54 MG TAB 30.00 30 [id number] 2017 [id number] N [id number] 00.0  2017 LYRICA 200 MG CAPSULE 90.00 30 [id number] 2017 [id number] R [id number] 00.0  2017 ZOLPIDEM TARTRATE 10 MG TABLET 30.00 30 [id number] 2017 [id number] R [id number] 00.0  2017 METHYLPHENIDATE ER 54 MG TAB 30.00 30 [id number] 10/30/2017 [id number] N [id number] 00.0  10/11/2017 LYRICA 200 MG CAPSULE 90.00 30 [id number] 2017 [id number] R [id number] 00.0  10/11/2017 ZOLPIDEM TARTRATE 10 MG TABLET 30.00 30 [id number] 2017 [id number] R [id number] 00.0  10/02/2017 METHYLPHENIDATE ER 54 MG TAB 30.00 30 [id number] 10/02/2017 [id number] N [id number]  00.0  09/27/2017 OXYCODONE-ACETAMINOPHEN 5-325 10.00 4 [id number] 09/27/2017 [id number] N [id number] 18.75  09/11/2017 LYRICA 200 MG CAPSULE 90.00 30 [id number] 08/14/2017 [id number] R [id number] 00.0  09/11/2017 ZOLPIDEM TARTRATE 10 MG TABLET 30.00 30 [id number] 08/09/2017 [id number] R [id number] 00.0  09/05/2017 METHYLPHENIDATE ER 54 MG TAB 30.00 30 [id number] 09/05/2017 [id number] N [id number] 00.0  08/15/2017 LYRICA 200 MG CAPSULE 90.00 30 [id number] 08/14/2017 [id number] N [id number] 00.0  08/14/2017 ZOLPIDEM TARTRATE 10 MG TABLET 30.00 30 [id number] 08/09/2017 [id number] N [id number] 00.0  08/07/2017 OXYCODONE-ACETAMINOPHEN 5-325 20.00 4 [id number] 08/07/2017 [id number] N [id number] 37.5  08/07/2017 METHYLPHENIDATE ER 54 MG TAB 30.00 30 [id number] 08/04/2017 [id number] N [id number] 00.0  07/25/2017 LORAZEPAM 1 MG TABLET 30.00 30 [id number] 07/25/2017 [id number] N [id number] 00.0  07/25/2017 OXYCODONE-ACETAMINOPHEN 5-325 30.00 5 [id number] 07/25/2017 [id number] N [id number] 45.0  07/18/2017 LYRICA 200 MG CAPSULE 90.00 30 [id number] 02/21/2017 [id number] R [id number] 00.0  *N/R N=New R=Refill  +MED Daily  Prescribers for prescriptions listed  ----------------------------------------------------------------------------------------------------------------------------------  [id number] BRISSA YATES MD; [employer] [facility], [address] [place] [address]  [id number] WALTER CLOUD MD; [address], [address], [facility] [address]  [id number] BRUNNER, EMILY A MD; [facility], [address] [facility] [address], [place] [address]  [id number] LYNDA BLANCO DDS; [facility], [place] [address]  Pharmacies that dispensed prescriptions listed  ----------------------------------------------------------------------------------------------------------------------------------  [id number] [facility]  : [facility]/PHARMACY # [id number], [address], [place] [address],  **Per CDC guidance, the conversion factors and associated daily morphine milligram equivalents for drugs prescribed as part of  medication-assisted treatment for opioid use disorder should not be used to benchmark against dosage thresholds meant for opioids  prescribed for pain.  Report Disclaimers:  The report provided above is based upon the search criteria and the data provided by the dispensing entities. For more information  about any prescription, please contact the dispenser or the prescriber.  This report contains confidential information, including patient identifiers, and is not a public record. The information on this  report must be treated as protected health information and is to be disclosed to others only as authorized by applicable state  and Federal regulations.  [employer] [place] Date: 01/10/18  Query Report Page#: 2  Patient Rx History Report  OPAL BAIG  Patients that match search criteria  ----------------------------------------------------------------------------------------------------------------------------------  [id number] OPAL BAIG,  57; [address], [place] [address]  [id number] OPAL BAIG,  57; [address], [place] [address]  [id number] OPAL BAIG,  57; [address], [place] [address]  [id number] OPAL BAIG,  57; [address] [facility] [place] [address]  MED Summary  This section displays cumulative MED values by unique recipient. The MED Max value is the maximum occurrence of cumulative MED  sustained for any 3 consecutive days. This value is calculated based on prescriptions dispensed during the date range requested.  -----------------------------------------------------------------------------------------------------------------------------------  46 SAFIA JOSEPH; 1955; [address] [address], [place] [address]  **Per CDC  guidance, the conversion factors and associated daily morphine milligram equivalents for drugs prescribed as part of  medication-assisted treatment for opioid use disorder should not be used to benchmark against dosage thresholds meant for opioids  prescribed for pain.  Report Disclaimers:  The report provided above is based upon the search criteria and the data provided by the dispensing entities. For more information  about any prescription, please contact the dispenser or the prescriber.  This report contains confidential information, including patient identifiers, and is not a public record. The information on this  report must be treated as protected health information and is to be disclosed to others only as authorized by applicable state  and Federal regulations.      Correct pharmacy verified with patient and confirmed in snapshot? [x] yes []no    Charge captured ? [x] yes  [] no    Medications Phoned  to Pharmacy [] yes [x]no  Name of Pharmacist:  List Medications, including dose, quantity and instructions      Medication Prescriptions given to patient   [x] yes  [] no   List the name of the drug the prescription was written for.   Concerta 54 mg   Lyrica 200 mg  Zolpidem 10 mg     Medications ordered this visit were e-scribed.  Verified by order class [] yes  [x] no    Medication changes or discontinuations were communicated to patient's pharmacy: [] yes  [x] no    UA collected [] yes  [x] no    [place] Reviewed? [x] yes  [] no    Referrals were made to:   None    Future appointment was made: [x] yes  [] no   7/13/18  Dictation completed at time of chart check: [] yes  [x] no    I have checked the documentation for today s encounters and the above information has been reviewed and completed.

## 2021-06-16 PROBLEM — I50.32 CHRONIC DIASTOLIC CHF (CONGESTIVE HEART FAILURE), NYHA CLASS 2 (H): Status: ACTIVE | Noted: 2017-12-04

## 2021-06-16 PROBLEM — E78.2 MIXED HYPERLIPIDEMIA: Status: ACTIVE | Noted: 2017-12-04

## 2021-06-16 PROBLEM — I42.9 IDIOPATHIC CARDIOMYOPATHY (H): Status: ACTIVE | Noted: 2017-06-22

## 2021-06-16 PROBLEM — F51.01 PRIMARY INSOMNIA: Status: ACTIVE | Noted: 2017-07-13

## 2021-06-16 PROBLEM — I25.84 CORONARY ATHEROSCLEROSIS DUE TO CALCIFIED CORONARY LESION: Status: ACTIVE | Noted: 2017-12-04

## 2021-06-16 PROBLEM — I25.10 CORONARY ATHEROSCLEROSIS DUE TO CALCIFIED CORONARY LESION: Status: ACTIVE | Noted: 2017-12-04

## 2021-06-16 PROBLEM — G89.29 CHRONIC MIDLINE LOW BACK PAIN: Status: ACTIVE | Noted: 2017-06-22

## 2021-06-16 PROBLEM — M54.50 CHRONIC MIDLINE LOW BACK PAIN: Status: ACTIVE | Noted: 2017-06-22

## 2021-06-16 PROBLEM — I65.22 LEFT CAROTID STENOSIS: Status: ACTIVE | Noted: 2017-07-13

## 2021-06-16 NOTE — TELEPHONE ENCOUNTER
Telephone Encounter by Jerica Lloyd at 2/27/2019 11:36 AM     Author: Jerica Lloyd Service: -- Author Type: --    Filed: 2/27/2019 11:39 AM Encounter Date: 2/25/2019 Status: Addendum    : Jerica Lloyd    Related Notes: Original Note by Jerica Lloyd filed at 2/27/2019 11:36 AM       [place] APPROVED:    Approval start date: 1/27/2019  Approval end date:2/27/2020    Pharmacy has been notified of approval and will contact patient when medication is ready for pickup.     Patient has a extremely high deductible which has to be met before her copay is reduced.  Over $700 copay.  Central [place] team cannot lower deductibles.

## 2021-06-16 NOTE — TELEPHONE ENCOUNTER
Telephone Encounter by Jerica Lloyd at 2/27/2019  9:06 AM     Author: Jerica Lloyd Service: -- Author Type: --    Filed: 2/27/2019  9:07 AM Encounter Date: 2/25/2019 Status: Signed    : Jerica Lloyd       [facility] team  [phone]    PA has been initiated.

## 2021-06-16 NOTE — PROGRESS NOTES
Assessment and Plan    1. Arthritis of right ankle  Acute pain, mild erythema, warmth and swelling without injury  - Uric Acid  - C-Reactive Protein  - Lyme Antibody Cascade  - XR Ankle Right 3 or More VWS; Future  - Sedimentation Rate    Above test normal/negative - add on   - ALINA   - CCP antibody   - RF factor (can sometimes present with monoarthritis    If these are normal refer to ortho for joint tap    2. Chronic diastolic CHF (congestive heart failure), NYHA class 2 (H)  Followed by cardiology - but due for labs  - Thyroid Stimulating Hormone (TSH)  - HM2(CBC w/o Differential)  - Comprehensive Metabolic Panel      Return if symptoms worsen or fail to improve, for and in 10 months for annual exam.    Avril Hernandze MD     -------------------------------------------    Chief Complaint   Patient presents with     Ankle Injury     right ankle swelling and pain        Remedios's pain started a week ago - just started throbbing without any history of injury.  She has been regularly walking on the treadmill, but this and other activities have not changed. Certain ways she walks causes pain up medial side of leg. Her ankle throbs at night. 2 ibuprofen ply 2 tylenol helps. She is NOT taking furosemide (less risk for gout)     A couple of week ago on big toe felt like she had a splinter - medial side of  Toenail.  This gradually resolved    Next week she is traveling to take care of father for three weeks, so she would like to figure this out    ROS   - no fever or chills   - she feels her energy level is lower   - no dyspnea   - slight nausea on and off       Last Covid vaccine a couple of week ago on 3/31        Current Outpatient Medications on File Prior to Visit   Medication Sig Dispense Refill     aspirin 81 MG EC tablet Take 81 mg by mouth daily.       atorvastatin (LIPITOR) 20 MG tablet TAKE 1 TABLET(20 MG) BY MOUTH DAILY 90 tablet 3     carvediloL (COREG) 25 MG tablet TAKE 1 TABLET(25 MG) BY MOUTH AT BEDTIME 90  tablet 3     cholecalciferol, vitamin D3, 1,000 unit (25 mcg) tablet Take 1,000 Units by mouth daily.       furosemide (LASIX) 20 MG tablet TAKE 1 TABLET BY MOUTH DAILY 90 tablet 1     methylphenidate HCl 54 MG CR tablet Take 54 mg by mouth daily.       methylphenidate HCl 54 MG CR tablet Take 1 tablet (54 mg total) by mouth daily. 30 tablet 0     pregabalin (LYRICA) 200 MG capsule TAKE 1 CAPSULE BY MOUTH THREE TIMES DAILY 90 capsule 2     traZODone (DESYREL) 100 MG tablet TAKE 1 TABLET BY MOUTH AT BEDTIME 90 tablet 2     zolpidem (AMBIEN) 10 mg tablet TAKE 1 TABLET(10 MG) BY MOUTH AT BEDTIME 90 tablet 0     methylphenidate HCl (CONCERTA) 54 MG CR tablet One daily 30 tablet 0     No current facility-administered medications on file prior to visit.        The history section was last reviewed by Sonia Byrd CMA on Apr 23, 2021.    Social History     Tobacco Use   Smoking Status Never Smoker   Smokeless Tobacco Never Used       Social History     Substance and Sexual Activity   Alcohol Use No    Comment: stop drinking in [facility] 2004         Vitals:    04/23/21 1019   BP: 130/80   Pulse: 83   SpO2: 98%     Body mass index is 19.27 kg/m .     EXAM:    General appearance - alert, well appearing, and in no distress  Mental status - normal mood, behavior, speech, dress, motor activity, and thought processes  Musculoskeletal - Pain with palpation of medial ankle, lower tibia - but not with passive ROM or resisted ROM

## 2021-06-16 NOTE — TELEPHONE ENCOUNTER
Refill Approved    Rx renewed per Medication Renewal Policy. Medication was last renewed on 12/28/2020.  Last office visit was 03/02/2021 with PCP.    Nemo Morgan, [facility] Triage/Med Refill 3/27/2021     Requested Prescriptions   Pending Prescriptions Disp Refills     carvediloL (COREG) 25 MG tablet [Pharmacy Med Name: CARVEDILOL 25MG TABLETS] 90 tablet 0     Sig: TAKE 1 TABLET(25 MG) BY MOUTH AT BEDTIME       Beta-Blockers Refill Protocol Passed - 3/26/2021  4:00 AM        Passed - PCP or prescribing provider visit in past 12 months or next 3 months     Last office visit with prescriber/PCP: 10/22/2019 Avril Hernandez MD OR same dept: Visit date not found OR same specialty: 10/22/2019 Avril Hernandez MD  Last physical: 3/2/2021 Last MTM visit: Visit date not found   Next visit within 3 mo: Visit date not found  Next physical within 3 mo: Visit date not found  Prescriber OR PCP: Avril Hernandez MD  Last diagnosis associated with med order: 1. Cardiomyopathy (H)  - carvediloL (COREG) 25 MG tablet [Pharmacy Med Name: CARVEDILOL 25MG TABLETS]; TAKE 1 TABLET(25 MG) BY MOUTH AT BEDTIME  Dispense: 90 tablet; Refill: 0    If protocol passes may refill for 12 months if within 3 months of last provider visit (or a total of 15 months).             Passed - Blood pressure filed in past 12 months     BP Readings from Last 1 Encounters:   03/02/21 138/76

## 2021-06-16 NOTE — TELEPHONE ENCOUNTER
Telephone Encounter by Cyndy Zavala LPN at 2/25/2019  9:14 AM     Author: Cyndy Zavala LPN Service: -- Author Type: Licensed Nurse    Filed: 2/25/2019  9:15 AM Encounter Date: 2/25/2019 Status: Signed    : Cyndy Zavala LPN (Licensed Nurse)       Remedios Beltrán Mary, MD 4 hours ago (5:00 AM)         From information I gathered at the pharmacy, I will need a pre-authorization for renewal of  my Lyrica 200mg TID in order for it to be processed through my insurance.   Thank you for your time!   Remedios   [phone]

## 2021-06-16 NOTE — TELEPHONE ENCOUNTER
Refill Approved    Rx renewed per Medication Renewal Policy. Medication was last renewed on 10/1/20.    Jose Rea, [facility] Triage/Med Refill 4/1/2021     Requested Prescriptions   Pending Prescriptions Disp Refills     atorvastatin (LIPITOR) 20 MG tablet [Pharmacy Med Name: ATORVASTATIN 20MG TABLETS] 90 tablet 1     Sig: TAKE 1 TABLET(20 MG) BY MOUTH DAILY       Statins Refill Protocol (Hmg CoA Reductase Inhibitors) Passed - 3/31/2021  3:57 AM        Passed - PCP or prescribing provider visit in past 12 months      Last office visit with prescriber/PCP: 10/22/2019 Avril Hernandez MD OR same dept: Visit date not found OR same specialty: 10/22/2019 Avril Hernandez MD  Last physical: 3/2/2021 Last MTM visit: Visit date not found   Next visit within 3 mo: Visit date not found  Next physical within 3 mo: Visit date not found  Prescriber OR PCP: Avril Hernandez MD  Last diagnosis associated with med order: 1. Hyperlipidemia  - atorvastatin (LIPITOR) 20 MG tablet [Pharmacy Med Name: ATORVASTATIN 20MG TABLETS]; TAKE 1 TABLET(20 MG) BY MOUTH DAILY  Dispense: 90 tablet; Refill: 1    If protocol passes may refill for 12 months if within 3 months of last provider visit (or a total of 15 months).

## 2021-06-16 NOTE — TELEPHONE ENCOUNTER
Controlled Substance Refill Request  Medication Name:   Requested Prescriptions     Pending Prescriptions Disp Refills     zolpidem (AMBIEN) 10 mg tablet [Pharmacy Med Name: ZOLPIDEM 10MG TABLETS] 90 tablet 0     Sig: TAKE 1 TABLET(10 MG) BY MOUTH AT BEDTIME     Date Last Fill: 1/8/21  Requested [facility]acy: [facility]  Submit electronically to pharmacy  Controlled Substance Agreement on file:   Encounter-Level CSA Scan Date - 08/09/2018:    Scan on 8/13/2018 12:47 PM        Last office visit:  3/2/21  Maegan Abdullahi RN, MA  [facility]    Triage Nurse Advisor

## 2021-06-16 NOTE — TELEPHONE ENCOUNTER
Controlled Substance Refill Request  Medication Name:   Requested Prescriptions     Pending Prescriptions Disp Refills     methylphenidate HCl 54 MG CR tablet 30 tablet 0     Sig: Take 1 tablet (54 mg total) by mouth daily.     Date Last Fill: 3/7/21  Requested Pharmacy: [facility]  Submit electronically to pharmacy  Controlled Substance Agreement on file:   Encounter-Level CSA Scan Date - 08/09/2018:    Scan on 8/13/2018 12:47 PM        Last office visit:  3/2/21

## 2021-06-17 NOTE — TELEPHONE ENCOUNTER
Medication: concerta  Last Date Filled 4/6   pulled: NO    Only PCP Prescribing? : YES  Taken as prescribed from physician notes? YES    CSA in last year: NO  Random Utox in last year: YES 10/28/20  (if any of the above answer NO - schedule with PCP)     Opioids + benzodiazepines? NO  (if the above answer YES - schedule with PCP every 6 months)     Is patient on the Executive Review Team? no      All responses suggest: Refilling prescription

## 2021-06-17 NOTE — TELEPHONE ENCOUNTER
Pt calling requesting refill of the concerta. She is leaving out of town 5/5. She would need to  from pharmacy today or tomorrow. Pt states Dr Hernandez is aware of her leaving town for 3 weeks. Pt states normally she wouldn't get refill until 5/6 but hoping she can get early. Please send refill to [facility] on [address].

## 2021-06-17 NOTE — TELEPHONE ENCOUNTER
Called to discuss labs results and catch up.  Ankle is a little better, less but still swollen and warm, hurts to walk on it    I called [facility] - Taled to [address] - they can do a joint tap - weill send referral, visit and lab [facility] - [phone]    Called Remedios back - she will make an appt

## 2021-06-17 NOTE — TELEPHONE ENCOUNTER
Refill Approved    Rx renewed per Medication Renewal Policy. Medication was last renewed on 08/28/2020.  Last office visit was 04/23/2021 with PCP.    Nemo Morgan, [facility] Triage/Med Refill 5/21/2021     Requested Prescriptions   Pending Prescriptions Disp Refills     traZODone (DESYREL) 100 MG tablet [Pharmacy Med Name: TRAZODONE 100MG TABLETS] 90 tablet 2     Sig: TAKE 1 TABLET BY MOUTH AT BEDTIME       Tricyclics/Misc Antidepressant/Antianxiety Meds Refill Protocol Passed - 5/21/2021  3:58 AM        Passed - PCP or prescribing provider visit in last year     Last office visit with prescriber/PCP: 4/23/2021 Avril Hernandez MD OR same dept: Visit date not found OR same specialty: 4/23/2021 Avril Hernandez MD  Last physical: 3/2/2021 Last MTM visit: Visit date not found   Next visit within 3 mo: Visit date not found  Next physical within 3 mo: Visit date not found  Prescriber OR PCP: Avril Hernandez MD  Last diagnosis associated with med order: 1. Primary insomnia  - traZODone (DESYREL) 100 MG tablet [Pharmacy Med Name: TRAZODONE 100MG TABLETS]; TAKE 1 TABLET BY MOUTH AT BEDTIME  Dispense: 90 tablet; Refill: 2    If protocol passes may refill for 12 months if within 3 months of last provider visit (or a total of 15 months).

## 2021-06-17 NOTE — TELEPHONE ENCOUNTER
Controlled Substance Refill Request  Medication Name:   Requested Prescriptions     Pending Prescriptions Disp Refills     methylphenidate HCl 54 MG CR tablet 30 tablet 0     Sig: Take 1 tablet (54 mg total) by mouth daily.     Date Last Fill: 4/5/21  Requested Pharmacy: [facility]  Submit electronically to pharmacy  Controlled Substance Agreement on file:   Encounter-Level CSA Scan Date - 08/09/2018:    Scan on 8/13/2018 12:47 PM        Last office visit:  4/23/21

## 2021-06-19 NOTE — LETTER
Letter by Avril Hernandez MD at      Author: Avril Hernandez MD Service: -- Author Type: --    Filed:  Encounter Date: 10/22/2019 Status: Signed         [facility] MEDICINE/OB  10/22/19    Patient: Cindy Beltrán  YOB: 1955  Medical Record Number: [id number]  CSN: [id number]                                                                              Non-opioid Controlled Substance Agreement    Diagnosis: 1) ADHD 2) insomnia  Medication : 1) Methylphenidate 54 mg CR 2) zolpidem 10 mg  Quantity per month: 1) 30) 30  Number of refills before follow up visit: 6 months - [facility]    I understand that my care provider has prescribed a controlled substance to help manage my condition(s). I am taking this medicine to help me function or work. I know this is strong medicine, and that it can cause serious side effects. Controlled substances can be sedating, addicting and may cause a dependency on the drug. They can affect my ability to drive or think, and cause depression. They need to be taken exactly as prescribed. Combining controlled substances with certain medicines or chemicals (such as cocaine, sedatives and tranquilizers, sleeping pills, meth) can be dangerous or even fatal. Also, if I stop controlled substances suddenly, I may have severe withdrawal symptoms.  If not helpful, I may be asked to stop them.    The risks, benefits, and side effects of these medicine(s) were explained to me. I agree that:    1. I will take part in other treatments as advised by my care team. This may be psychiatry or counseling, physical therapy, behavioral therapy, group treatment or a referral to a pain clinic. I will reduce or stop my medicine when my care team tells me to do so.  2. I will take my medicines as prescribed. I will not change the dose or schedule unless my care team tells me to. There will be no refills if I run out early.  I may be contactedwithout warning and asked to complete a  urine drug test or pill count at any time.   3. I will keep all my appointments, and understand this is part of the monitoring of controlled substances. My care team may require an office visit for EVERY controlled substance refill. If I miss appointments or dont follow instructions, my care team may stop my medicine.  4. I will not ask other providers to prescribe controlled substances, and I will not accept controlled substances from other people. If I need another prescribed controlled substance for a new reason, I will tell my care team within 1 business day.  I will use one pharmacy to fill all of my controlled substance prescriptions, and it is up to me to make sure that I do not run out of my medicines on weekends or holidays. If my care team is willing to refill my controlled substance prescription without a visit, I must request refills only during office hours, refills may take up to 3 days to process, and it may [facility]ILY MEDICINE/OB  10/22/19  Patient:  Cindy Beltrán  YOB: 1955  Medical Record Number: [id number]  CSN: [id number]  5. take up to 5 to 7 days for my medicine to be mailed and ready at my pharmacy. Prescriptions will not be mailed anywhere except my pharmacy.    6. I am responsible for my prescriptions. If the medicine/prescription is lost or stolen, it will not be replaced. I also agree not to share controlled substance medicines with anyone.  7. I agree to not use ANY illegal or recreational drugs. This includes marijuana, cocaine, bath salts or other drugs. I agree not to use alcohol unless my care team says I may. I agree to give urine samples whenever asked. If I dont give a urine sample, the care team may stop my medicine.    8. If I enroll in the [place] Medical Marijuana program, I will tell my care team. I will also sign an agreement to share my medical records with my care team.    9. I will bring in my list of medicines (or my medicine bottles) each  time I come to the clinic.   10. I will tell my care team right away if I become pregnant or have a new medical problem treated outside of my regular clinic.  11. I understand that this medicine can affect my thinking and judgment. It may be unsafe for me to drive, use machinery and do dangerous tasks. I will not do any of these things until I know how the medicine affects me. If my dose changes, I will wait to see how it affects me. I will contact my care team if I have concerns about medicine side effects.    I understand that if I do not follow any of the conditions above, my prescriptions or treatment may be stopped.      I agree that my provider, clinic care team, and pharmacy may work with any city, state or federal law enforcement agency that investigates the misuse, sale, or other diversion of my controlled medicine. I will allow my provider to discuss my care with or share a copy of this agreement with any other treating provider, pharmacy or emergency room where I receive care. I agree to give up (waive) any right of privacy or confidentiality with respect to these consents.   I have read this agreement and have asked questions about anything I did not understand.    ___________________________________________________________________________  Patient signature - Date/Time  -Cindy Beltrán                                      ___________________________________________________________________________  Witness signature                                                                    ___________________________________________________________________________  Provider signature- Avril Hernandez MD

## 2021-06-19 NOTE — PROGRESS NOTES
"FEMALE PREVENTATIVE EXAM    Assessment and Plan:       1. Routine general medical examination at a health care facility  Well woman    2. Arthralgia of both hands  New, occurs more in the morning.  She is the right age for development of rheumatoid arthritis, so I would like to rule this out:  - C-Reactive Protein  - Rheumatoid Factor Quant  - CCP Antibodies    3. Other chronic pain  Ongoing and well controlled with:  - pregabalin (LYRICA) 200 MG capsule; TAKE 1 CAPSULE BY MOUTH 3 TIMES A DAY.  Dispense: 90 capsule; Refill: 5    4. ADD (attention deficit disorder) without hyperactivity  Ongoing and well controlled with:  - methylphenidate HCl (CONCERTA) 54 MG CR tablet; One daily, do not fill before 04/25/2018  Dispense: 30 tablet; Refill: 0    5. Controlled substance agreement signed  For above medication, and fr zolpidem below  - Pain Clinic Survey, Urine  - methylphenidate confirmation - Misc. Lab Test  - [facility] Misc Test    6. Psychophysiological insomnia  - zolpidem (AMBIEN) 10 mg tablet; Take 1 tablet (10 mg total) by mouth at bedtime.  Dispense: 30 tablet; Refill: 5    7. Mixed hyperlipidemia  - Lipid [place] FASTING    8. Idiopathic cardiomyopathy (H)  Stable, takes carvedilol and furosemide, due for   - Basic Metabolic Panel    9. Encounter for screening mammogram for breast cancer  - Mammo Screening Bilateral; Future    Patient Instructions   Check you insurance for coverage of shingles vaccine        Next follow up:  Return in about 6 months (around 2/9/2019).    Immunization Review  Adult Imm Review: No immunizations due today      I discussed the following with the patient:   Adult Healthy Living: Importance of regular exercise  Healthy nutrition  Helmets  If doing breast self exam  - NOT \"looking for lumps\" but getting to know what is your normal and being aware of changes    I have had an Advance Directives discussion with the patient.    Subjective:   Chief Complaint: Cindy Beltrán is an 63 y.o. " female here for a preventative health visit.     HPI:    Remedios comes for her annual exam. She has history of substance abuse - in remission for years - and used to see Dr. Reed who is no longer working in the [employer] system.  She wonders if I can take over prescribing Concerta for ADHD - I said I would be fine with that and explained need for yearly  controlled substance agreement and urine screen. She says that her concerta was last filled on July 20th.      She also takes Lyrica for chronic pain.  This was started 2 years ago back surgery pain as well as more generalized pain which she thinks is likely fibromyalgia . Dr. Reed thought she could come down on Lyrica - she has been trying to go down to two times a day  - this is her 4th week on just BID. She has just met her deductible and was previously paying 800 month for Lyrica. She asks if I can keep RX at three times a day so she can have more of it covered before next year when she has to start paying again    She takes zolpidem 10 gm and trazodone 100 mg for insomnia.  She will need refills of the zolpidem.  These work well together and she has not problems with sleeping as long as she takes them    She has idiopathic cardiomyopathy and take carvedilol once daily (BID on RX to save money) - she is feeling  fine -  no chest pains, palpitations or dyspnea    She notes some more consistent pain in her MCP joints of her hands - feels stiff in the morning and sometimes she drops things    Social: , Works as casual respiratory therapist        Healthy Habits  Are you taking a daily aspirin? No  Do you typically exercising at least 40 min, 3-4 times per week?  Yes 5 yoga classes a week, plus some weight, walking  Do you usually eat at least 4 servings of fruit and vegetables a day, include whole grains and fiber and avoid regularly eating high fat foods? Yes  Have you had an eye exam in the past two years? Yes - just had eye check  Do you see a  dentist twice per year? Yes  Do you have any concerns regarding sleep? No    Safety Screen  If you own firearms, are they secured in a locked gun cabinet or with trigger locks? NO  Do you feel you are safe where you are living?: Yes (8/9/2018 10:30 AM)  Do you feel you are safe in your relationship(s)?: Yes (8/9/2018 10:30 AM)    Review of Systems:    Constitutional: negative for recent illness or change in weight  Eyes: negative for irritation and vision change  Ears, nose, mouth, throat, and face: negative for nasal congestion and sore throat  Respiratory: negative for cough and dyspnea on exertion  Cardiovascular: negative for chest pain and palpitations  Gastrointestinal: negative for abdominal pain and change in bowel habits  Genitourinary:negative for dysuria, frequency and hesitancy  Integument/breast: negative for rash  Hematologic/lymphatic: negative for bleeding and easy bruising  Musculoskeletal:positive  for arthralgias of hands, and general (not new)  myalgias  Neurological: negative for dizziness, headaches and paresthesia'      Cancer Screening       Status Date      MAMMOGRAM Overdue 7/20/2018      Done 7/20/2017 MAMMO SCREENING BILATERAL     Patient has more history with this topic...    COLONOSCOPY Next Due 3/19/2019      Done 3/19/2009 COLONOSCOPY EXTERNAL RESULT    PAP SMEAR Next Due 6/17/2021      Done 6/17/2016 GYNECOLOGIC CYTOLOGY (PAP SMEAR)     Patient has more history with this topic...              History     Reviewed By Date/Time Sections Reviewed    Jayce Collins CMA 8/9/2018 10:30 AM Tobacco            Objective:   Vital Signs:   Visit Vitals     /62 (Patient Site: Right Arm, Patient Position: Sitting, Cuff Size: Adult Regular)     Pulse 70     Ht 5' (1.524 m)     Wt 108 lb (49 kg)     SpO2 99%     BMI 21.09 kg/m2        PHYSICAL EXAM  General appearance - alert, well appearing, and in no distress  Mental status - normal mood, behavior, speech, dress, motor activity, and  thought processes, positive attitude, very pleasant and upbeat  Eyes - pupils equal and reactive, extraocular eye movements intact, funduscopic exam normal, discs flat and sharp  Ears - bilateral TM's and external ear canals normal  Mouth - mucous membranes moist, pharynx normal without lesions  Neck - supple, no significant adenopathy, carotids upstroke normal bilaterally, no bruits, thyroid exam: thyroid is normal in size without nodules or tenderness  Chest - clear to auscultation, no wheezes, rales or rhonchi, symmetric air entry  Heart - normal rate, regular rhythm, normal S1, S2, no murmurs, rubs, clicks or gallops  Abdomen - soft, nontender, nondistended, no masses or organomegaly  Breasts - breasts appear normal, no suspicious masses, no skin or nipple changes or axillary nodes  Neurological - alert, oriented, normal speech, no focal findings or movement disorder noted, DTR's normal and symmetric  Extremities - peripheral pulses normal, no pedal edema, no clubbing or cyanosis  Skin - no rashes or worrisome lesions          The 10-year ASCVD risk score (Stewart BRITTANI Jr, et al., 2013) is: 3.2%    Values used to calculate the score:      Age: 63 years      Sex: Female      Is Non- : No      Diabetic: No      Tobacco smoker: No      Systolic Blood Pressure: 110 mmHg      Is BP treated: Yes      HDL Cholesterol: 77 mg/dL      Total Cholesterol: 190 mg/dL

## 2021-06-20 NOTE — LETTER
Letter by Scotty Jimenez MD at      Author: Scotty Jimenez MD Service: -- Author Type: --    Filed:  Encounter Date: 2/4/2020 Status: (Other)         Cindy Beltrán  [address] [place] [address]      February 4, 2020      Dear Cindy,    This letter is to remind you that you will be due for your follow up appointment with Dr. Scotty Jimenez in March, 2020 . To help ensure you are in the best health possible, a regular follow-up with your cardiologist is essential.     Please call our Patient Scheduling Line at [phone] to schedule your appointment at your earliest convenience.  If you have recently scheduled an appointment, please disregard this letter.    We look forward to seeing you again. As always, we are available at the number  above for any questions or concerns you may have.      Sincerely,     The Physicians and Staff of [facility]

## 2021-06-21 NOTE — LETTER
Letter by Avril Hernandez MD at      Author: Avril Hernandez MD Service: -- Author Type: --    Filed:  Encounter Date: 5/3/2021 Status: (Other)       Non-Opioid Controlled Substance Agreement    Diagnosis: 1) ADHD 2) insomnia 3) chronic pain  Medication : 1) Methylphenidate 54 mg CR 2) zolpidem 10 mg 3 pregabalin 200 mg tid  Quantity per month: 1) 30) 30 3) 90  Number of refills before follow up visit: 6 months - may be by [facility]    This is an agreement between you and your provider regarding safe and appropriate controlled substance prescribing.? Controlled substances are?medicines that can cause physical and mental dependence. The manufacturing, possession and use of these medicines are regulated by law.  We here at [facility] are making a commitment to work with you in your efforts to get better.? To support you in this work, we will help you schedule regular appointments for medicine refills. If we must cancel or change your appointment for any reason, we will make sure you have enough medication to last until your next appointment.      As a Provider, I will:     Listen carefully to your concerns while treating you with dignity.     Recommend a treatment plan that I believe is in your best interest and may involve therapies other than medication.      Review the chance of benefit and the chance of harm of this medicine with you regularly and evaluate the safety and effectiveness of this therapy.       Provide a plan on how to discontinue if the decision is made to stop this medicine.       As a Patient, I understand controlled substances:       Are prescribed by my care provider to help me function or work and manage my condition(s).?    Are strong medicines and can cause serious side effects.      Need to be taken exactly as prescribed.?Combining controlled substances with certain medicines or chemicals (such as illegal drugs, alcohol, sedatives, sleeping pills, and benzodiazepines) can be dangerous or  even fatal.? If I stop taking my medicines suddenly, I may have severe withdrawal symptoms.     The risks, benefits, and side effects of these medicine(s) were explained to me. I agree that:    1. I will take part in other treatments as advised by my care team. This may be psychiatry or counseling, physical therapy, behavioral therapy, group treatment or a referral to specialist.    2. I will keep all my appointments and understand this is part of the monitoring of controlled substance.?My care team may require an office visit for EVERY controlled substance refill. If I miss appointments or dont follow instructions, my care team may stop my medicine  3. I will take my medicines as prescribed. I will not change the dose or schedule unless my care team tells me to. There will be no refills if I run out early.      4. I may be contactedwithout warning and asked to complete a urine drug test or pill count at any time. If I dont give a urine sample or participate in a pill count, the care team may stop my medicine.    5. I will only receive controlled substance prescriptions from this clinic. If treated by another provider, I will let them know I am taking controlled substances and that I have a treatment agreement with this provider. I will inform this care team within one business day if I am given a prescription for any controlled substance by another healthcare provider. This care team may contact other providers and pharmacists about my use of the medicines.     6. It is up to me to make sure that I do not run out of my medicines on weekends or holidays.?If my care team is willing to refill my prescription without a visit, I must request refills only during office hours. Refills may take up to 3 business days to process.  I will use one pharmacy to fill all my controlled substance prescriptions.  I will notify the clinic if any changes are made due to insurance changes or medication availability.    7. I am  responsible for my prescriptions. If the medicine/prescription is lost, stolen or destroyed, it will not be replaced.?I also agree not to share controlled substance medicines with anyone.     8. I am aware I should not use any illegal or recreational drugs. I agree not to drink alcohol unless my care team says I can.     9. If I enroll in the [place] Medical Cannabis program, I will tell my care team.?    10. I will tell my care team right away if I become pregnant, have a new medical problem treated outside of my regular clinic, or have a change in my medications.     11. I understand that this medicine can affect my thinking, judgment and reaction time.? Alcohol and drugs affect the brain and body, which can affect the safety of a persons driving. Being under the influence of alcohol or drugs can affect a persons decision-making, behaviors, personal safety, and the safety of others. Driving while impaired (DWI) can occur if a person is driving, operating, or in physical control of a car, motorcycle, boat, snowmobile, ATV, motorbike, off-road vehicle, or any other motor vehicle ([place] Statute 169A.20). I understand the risk if I choose to drive or operate machinery  I understand that if I do not follow any of the conditions above, my prescriptions or treatment may be stopped or changed.     I agree that my provider, clinic care team, and pharmacy may work with any city, state or federal law enforcement agency that investigates the misuse, sale, or other diversion of my controlled medicine. I will allow my provider to discuss my care with or share a copy of this agreement with any other treating provider, pharmacy or emergency room where I receive care.?    I have read this agreement and have asked questions about anything I did not understand.    ________________________________________________________  Patient Elana - Cindy Beltrán     ___________________                   Date      ________________________________________________________  Provider Signature - Avril M Derby, MD       ___________________                   Date     ________________________________________________________  Witness Signature (required if provider not present while patient signing)          ___________________                   Date

## 2021-06-22 NOTE — TELEPHONE ENCOUNTER
Controlled Substance Refill Request  Medication:   Requested Prescriptions     Pending Prescriptions Disp Refills     methylphenidate HCl (CONCERTA) 54 MG CR tablet 30 tablet 0     Sig: One daily     Date Last Fill: 12/4/18  Pharmacy: [id number]   Submit electronically to pharmacy  Controlled Substance Agreement on File:   Encounter-Level CSA Scan Date:    There are no encounter-level csa scan date.       Last office visit: Last office visit pertaining to requested medication was 8/9/18.

## 2021-06-23 NOTE — TELEPHONE ENCOUNTER
Patient Returning Call  Reason for call:  patient  Information relayed to patient:  Relayed information below   Patient has additional questions:  No  If YES, what are your questions/concerns:    Okay to leave a detailed message?: No call back needed   Patient will call next week to schedule this check up

## 2021-06-23 NOTE — TELEPHONE ENCOUNTER
"Triage call:   Patient calling to report left breast and underarm discomfort and left eye pressure, and increased lethargy that has been present for about 1 week.     Triaged to be seen in the clinic within the next three days. Reviewed additional care advice. Also advised patient to call as schedule a mammogram for when she returns from her trip (last mammogram 7/17).  Patient warm transferred to scheduling for appointment. Appointment scheduled for 4pm today with Marge Montiel.    Susan Helton RN [facility] Care Connection Triage/Med Refill 1/22/2019 12:11 PM     Reason for Disposition    Patient wants to be seen    Answer Assessment - Initial Assessment Questions  1. SYMPTOM: \"What's the main symptom you're concerned about?\"  (e.g., lump, pain, rash, nipple discharge)      Left breast discomfort, feels like she \"needs to put pressure on her breast and under her arm\" small pea sized lumps  2. LOCATION: \"Where is the discomfort located?\"      Left breast and underarm  3. ONSET: \"When did discomfort  start?\"      About one week ago  4. PRIOR HISTORY: \"Do you have any history of prior problems with your breasts?\" (e.g., lumps, cancer, fibrocystic breast disease)      Has dense breasts and history of a right biopsy (benign)   5. CAUSE: \"What do you think is causing this symptom?\"      Wondering if she has some kind of virus  6. OTHER SYMPTOMS: \"Do you have any other symptoms?\" (e.g., fever, breast pain, redness or rash, nipple discharge)      Left eye pressure/pain, increased lethargy  7. PREGNANCY-BREASTFEEDING: \"Is there any chance you are pregnant?\" \"When was your last menstrual period?\" \"Are you breastfeeding?\"      n/a    Protocols used: BREAST SYMPTOMS-A-[place]      "

## 2021-06-23 NOTE — TELEPHONE ENCOUNTER
RN cannot approve Refill Request    RN can NOT refill this medication because it appears it hasn't been used as prescribed.  last rx given 7/3/17 for 9 months supply. Last office visit: Visit date not found Last Physical: 8/9/2018 Last MTM visit: Visit date not found Last visit same specialty: 1/22/2019 Marge Montiel CNP.  Next visit within 3 mo: Visit date not found  Next physical within 3 mo: Visit date not found      Allie Pate, [facility] Connection Triage/Med Refill 1/25/2019    Requested Prescriptions   Pending Prescriptions Disp Refills     carvedilol (COREG) 25 MG tablet [Pharmacy Med Name: CARVEDILOL 25MG TABLETS] 180 tablet 0     Sig: TAKE 1 TABLET BY MOUTH TWICE DAILY WITH MEALS    Beta-Blockers Refill Protocol Passed - 1/23/2019 12:54 PM       Passed - PCP or prescribing provider visit in past 12 months or next 3 months    Last office visit with prescriber/PCP: Visit date not found OR same dept: 1/22/2019 Marge Montiel CNP OR same specialty: 1/22/2019 Marge Montiel CNP  Last physical: 8/9/2018 Last MTM visit: Visit date not found   Next visit within 3 mo: Visit date not found  Next physical within 3 mo: Visit date not found  Prescriber OR PCP: Avril Hernandez MD  Last diagnosis associated with med order: 1. Cardiomyopathy (H)  - carvedilol (COREG) 25 MG tablet [Pharmacy Med Name: CARVEDILOL 25MG TABLETS]; TAKE 1 TABLET BY MOUTH TWICE DAILY WITH MEALS  Dispense: 180 tablet; Refill: 0    If protocol passes may refill for 12 months if within 3 months of last provider visit (or a total of 15 months).            Passed - Blood pressure filed in past 12 months    BP Readings from Last 1 Encounters:   01/22/19 150/76

## 2021-06-23 NOTE — TELEPHONE ENCOUNTER
Left message to call back for: Pt  Information to relay to patient:  Message from provider as noted below.

## 2021-06-23 NOTE — PROGRESS NOTES
"Assessment & Plan   1. Discomfort of left eye  New onset left eye pressure for 1 week associated with mild redness.  No change in visual acuity, her left eye is actually sharper than her right eye today.  Symptoms described more as pressure than sharp stabbing pain, low suspicion for trigeminal neuralgia.  Discussed concern for acute glaucoma with sudden onset of pressure and general malaise.  Urgent referral for ophthalmology, she will work with scheduling to get this set up today.  - Ambulatory referral to Ophthalmology    2. Breast pain, left  No findings on exam though she does have generally dense breasts.  Will refer for diagnostic mammogram and ultrasound especially in light of her strong family medical history of breast cancer.  - Mammo Diagnostic Bilateral; Future  - US Breast Limited (Focal) Left; Future    3. Screen for colon cancer    - Ambulatory referral for Colonoscopy    Marge Montiel CNP    Subjective   Chief Complaint:  Breast Mass (left breast discomfort ) and Fatigue    HPI:   Cindy Beltrán is a 63 y.o. female who presents for left eye pain and left breast discomfort.      Left breast:  Pain present for approximately one week.  Feels like a pressure and aching in the left upper outer quadrant and axilla. No masses.  FMH of breast cancer in aunts. Last mammogram in June 2017.     Left eye: She complains of a general feeling of pressure behind the left eye that is fairly constant for the last week.  Seems to be slightly worsening with time.  Mild redness as well.  She is unsure if she is noticed any vision changes.  No floaters in the vision or changes in visual field.  No discharge or draining.  She does not have the sensation of a foreign body in the eye.  She does not recall any trauma to the eye.  Holding her left hand over her eye, states it just feels like pressure.  Has felt generally slightly fatigued as well, \"just feel off \".    Allergies:  has No Known Allergies.    SH/FH:  Social " History and Family History reviewed and updated.   Tobacco Status:  She  reports that  has never smoked. she has never used smokeless tobacco.    Review of Systems:  A complete head to toe ROS is negative unless otherwise noted in HPI    Objective     Vitals:    01/22/19 1552   BP: 150/76   Patient Site: Left Arm   Patient Position: Sitting   Cuff Size: Adult Regular   Pulse: (!) 103   SpO2: 99%   Weight: 112 lb 1.3 oz (50.8 kg)   Height: 5' (1.524 m)       Physical Exam:  GENERAL: Alert, well-appearing female.   EYES: Conjunctiva pink, sclera white, no exudates. YADIRA.  EOMs intact. Undilated fundoscopic exam normal.  Examination with fluoroscein staining under black light does not reveal corneal abrasion.   EARS: TMs pearly grey, no bulging, redness, retraction.   NOSE: Nares patent, no discharge.    BREASTS: Breasts symmetric, no dimpling, masses or skin discolorations seen. Areolas and nipples symmetric without discharge. On palpation, breast tissue supple and nontender. No masses or nodules. Axillary and epitrochlear lymph nodes nonpalpable.

## 2021-06-24 NOTE — TELEPHONE ENCOUNTER
Controlled Substance Refill Request  Medication:   Requested Prescriptions     Pending Prescriptions Disp Refills     methylphenidate HCl (CONCERTA) 54 MG CR tablet 30 tablet 0     Sig: One daily     Date Last Fill: 1/8/19  Pharmacy: [facility]   Submit electronically to pharmacy    Controlled Substance Agreement on File:   Encounter-Level CSA Scan Date:    There are no encounter-level csa scan date.       Last office visit with primary: Visit date not found

## 2021-06-24 NOTE — TELEPHONE ENCOUNTER
Who is calling:  Patient  Reason for Call:  Patient stated she is out and would like this submitted as soon as possible.  Date of last appointment with primary care: 2/21/19  Has the patient been recently seen:  Yes  Okay to leave a detailed message: Yes  [phone]

## 2021-06-24 NOTE — PROGRESS NOTES
Assessment and Plan    1. 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 Psychophysiological insomnia  Well controlled on:   - zolpidem (AMBIEN) 10 mg tablet; Take 1 tablet (10 mg total) by mouth at bedtime.  Dispense: 30 tablet; Refill: 5    3. Other chronic pain  For neuropathic pain of legs  - pregabalin (LYRICA) 200 MG capsule; TAKE 1 CAPSULE BY MOUTH 3 TIMES A DAY.  Dispense: 90 capsule; Refill: 5      Patient Instructions   Check into shingles vaccine      Return in about 6 months (around 8/21/2019) for Annual physical.    Avril Hernandez MD     -------------------------------------------    Chief Complaint   Patient presents with     Follow-up     f/u med check      Remedios comes for a med check - I had asked her to come if for refills, CSA - I erroneously thought that she had not completed a CSA and urine screening but she has.  So this is a very breif visit.    Med / Issues review  Edema: only takes furosemide if feeling legs or hands are more swollen than usual - duque on every 2 weeks    neuropathy (previous history of alcohol abuse)  - Lyrica is for ingling for kne down    Hand arthritis - normal CRP - presumed osteoarthritis.  soemteims in morning the joints hurt, once she gets goig she is OK    Insomnia - she understand medication must be renewed every 6 months.  Takes zolpidem consistently nightly - works well    ADD - well controlled with methylphenidate. No concerns for weight loss     Wt Readings from Last 3 Encounters:   02/21/19 111 lb 4 oz (50.5 kg)   01/22/19 112 lb 1.3 oz (50.8 kg)   11/08/18 111 lb (50.3 kg)       NOTE: she has a  controlled substance agreement for the above two medications from 8/2018          Current Outpatient Medications on File Prior to Visit   Medication Sig Dispense Refill     aspirin 81 MG EC tablet Take 81 mg by mouth daily.       atorvastatin  (LIPITOR) 20 MG tablet Take 1 tablet (20 mg total) by mouth daily. 90 tablet 2     carvedilol (COREG) 25 MG tablet TAKE 1 TABLET BY MOUTH TWICE DAILY WITH MEALS 180 tablet 0     furosemide (LASIX) 20 MG tablet TAKE 1 TABLET BY MOUTH DAILY 90 tablet 1     traZODone (DESYREL) 100 MG tablet One at bedtime 90 tablet 3     No current facility-administered medications on file prior to visit.          Health Maintenance Due   Topic Date Due     ZOSTER VACCINES (1 of 2) 04/14/2006     INFLUENZA VACCINE RULE BASED (1) 08/01/2018     COLONOSCOPY  03/19/2019     Health Maintenance reviewed - declines flu vaccine, will be working on     Social History     Tobacco Use   Smoking Status Never Smoker   Smokeless Tobacco Never Used       Social History     Substance and Sexual Activity   Alcohol Use No    Comment: stop drinking in [place] 2004         Vitals:    02/21/19 1512   BP: 128/72   Pulse: 74   Resp: 16   SpO2: 98%     Body mass index is 21.73 kg/m .     EXAM:    General appearance - alert, well appearing, and in no distress  Mental status - normal mood, behavior, speech, dress, motor activity, and thought processes

## 2021-06-25 NOTE — TELEPHONE ENCOUNTER
Controlled Substance Refill Request  Medication Name:   Requested Prescriptions     Pending Prescriptions Disp Refills     pregabalin (LYRICA) 200 MG capsule [Pharmacy Med Name: PREGABALIN 200MG CAPSULES] 90 capsule 0     Sig: TAKE 1 CAPSULE BY MOUTH THREE TIMES DAILY     Date Last Fill: 2/23/21  Requested Pharmacy: [facility]  Submit electronically to pharmacy  Controlled Substance Agreement on file:   Encounter-Level CSA Scan Date - 08/09/2018:    Scan on 8/13/2018 12:47 PM        Last office visit:  4/23/21

## 2021-06-25 NOTE — TELEPHONE ENCOUNTER
Controlled Substance Refill Request  Medication Name:   Requested Prescriptions     Pending Prescriptions Disp Refills     methylphenidate HCl 54 MG CR tablet 30 tablet 0     Sig: Take 1 tablet (54 mg total) by mouth daily.     Date Last Fill: 5/4/21  Requested Pharmacy: [facility]  Submit electronically to pharmacy  Controlled Substance Agreement on file:   Encounter-Level CSA Scan Date - 08/09/2018:    Scan on 8/13/2018 12:47 PM        Last office visit:  4/23/21

## 2021-06-26 NOTE — PROGRESS NOTES
Progress Notes by Scotty Jimenez MD at 11/8/2018 10:10 AM     Author: Scotty Jimenez MD Service: -- Author Type: Physician    Filed: 11/8/2018 10:09 AM Encounter Date: 11/8/2018 Status: Signed    : Scotty Jimenez MD (Physician)           Click to link to [facility] HEART [facility] NOTE       Assessment/Plan:   1.  Calcified coronary atherosclerosis: The patient had no chest pain or shortness of breath.  She had a coronary CT angiogram in 2016 which was reported minimal coronary artery disease.  Continue risk of factor control.    2.  Chronic diastolic congestive heart failure: The patient is compensated well.  No signs of fluid retention.  Continue Lasix 20 mg as needed.    3.  Hyperlipidemia: Continue Lipitor 20 mg at bedtime.  Her LDL was controlled.    4.  Minimal carotid artery stenosis: Observe it.    Thank you for the opportunity to be involved in the care of Cindy Beltrán. If you have any questions, please feel free to contact me.  I will see the patient again in 12 months.    Much or all of the text in this note was generated through the use of Dragon Dictate voice-to-text software. Errors in spelling or words which seem out of context are unintentional.   Sound alike errors, in particular, may have escaped editing.       History of Present Illness:   It is my pleasure to see Cindy Beltrán at the [facility] clinic for evaluation of Follow-up.  Cindy Beltrán is a 63 y.o. female with a medical history of calcified coronary atherosclerosis, mild carotid artery stenosis, chronic diastolic CHF, and hyperlipidemia.    The patient states that she has been doing quite well over last year.  She has been taking Lasix 20 mg as needed, not every day.  Her weight has been stable.  She had no shortness of breath on exertion, no shortness of breath at rest.  She had no chest pain.  She has occasional irregular heartbeats, lasted for seconds.  She had no lightheadedness dizziness  orthopnea PND or leg edema.  Her blood pressure and heart rate are controlled well.  Her laboratory tests were reported normal electrolytes and renal function.    Past Medical History:     Patient Active Problem List   Diagnosis   ? History of narcotic use   ? Alcohol dependence in remission (H)   ? Cervical neuralgia   ? Hyperlipidemia   ? ADD (attention deficit disorder)   ? Mitral regurgitation   ? Chronic midline low back pain   ? Idiopathic cardiomyopathy (H)   ? Left carotid stenosis   ? Primary insomnia   ? Mixed hyperlipidemia   ? Coronary atherosclerosis due to calcified coronary lesion   ? Chronic diastolic CHF (congestive heart failure), NYHA class 2 (H)       Past Surgical History:     Past Surgical History:   Procedure Laterality Date   ? BREAST BIOPSY Right     benign    ? COLPOSCOPY     ? dental implant  2012   ? HIP ARTHROSCOPY W/ LABRAL REPAIR Right 2012   ? LAMINECTOMY AND MICRODISCECTOMY LUMBAR SPINE  12/17/2014    Procedure: Decpmpression Lumbar Minimally invasive once level; surgeon: Tripp Clifton MD; [facility]   ? OTHER SURGICAL HISTORY  2004    Pr Biopsy/Excise Cervical Lesion; Polyps   ? TUMOR REMOVAL  1987    Parotid Gland, Benign   ? Xr Mammo Bilateral Diagnostic (Ia)  03/2009       Family History:     Family History   Problem Relation Age of Onset   ? Alcohol abuse Mother    ? Heart disease Mother    ? Hypertension Mother    ? Hyperlipidemia Mother    ? Heart disease Father    ? Hypertension Father    ? Hyperlipidemia Father    ? Alcohol abuse Sister    ? Breast cancer Sister    ? Alcohol abuse Brother      age 50   ? Drug abuse Son    ? Breast cancer Maternal Aunt      x3, ages 60's   ? Colon cancer Paternal Uncle         Social History:    reports that she has never smoked. She has never used smokeless tobacco. She reports that she does not drink alcohol or use illicit drugs.    Review of Systems:   General: WNL  Eyes: WNL  Ears/Nose/Throat: WNL  Lungs: WNL  Heart: WNL  Stomach:  WNL  Bladder: WNL  Muscle/Joints: WNL  Skin: WNL  Nervous System: WNL  Mental Health: WNL     Blood: WNL    Meds:     Current Outpatient Prescriptions:   ?  aspirin 81 MG EC tablet, Take 81 mg by mouth daily., Disp: , Rfl:   ?  atorvastatin (LIPITOR) 20 MG tablet, Take 1 tablet (20 mg total) by mouth daily., Disp: 90 tablet, Rfl: 2  ?  carvedilol (COREG) 25 MG tablet, TAKE 1 TABLET (25 MG TOTAL) BY MOUTH 2 (TWO) TIMES A DAY WITH MEALS., Disp: 180 tablet, Rfl: 2  ?  furosemide (LASIX) 20 MG tablet, TAKE 1 TABLET BY MOUTH DAILY, Disp: 90 tablet, Rfl: 1  ?  methylphenidate HCl (CONCERTA) 54 MG CR tablet, One daily, Disp: 30 tablet, Rfl: 0  ?  pregabalin (LYRICA) 200 MG capsule, TAKE 1 CAPSULE BY MOUTH 3 TIMES A DAY., Disp: 90 capsule, Rfl: 5  ?  traZODone (DESYREL) 100 MG tablet, One at bedtime, Disp: 90 tablet, Rfl: 3  ?  zolpidem (AMBIEN) 10 mg tablet, Take 1 tablet (10 mg total) by mouth at bedtime., Disp: 30 tablet, Rfl: 5    Allergies:   Review of patient's allergies indicates no known allergies.      Objective:      Physical Exam  111 lb (50.3 kg)  5' (1.524 m)  Body mass index is 21.68 kg/(m^2).  /80 (Patient Site: Right Arm, Patient Position: Sitting, Cuff Size: Adult Regular)  Pulse 80  Resp 16  Ht 5' (1.524 m)  Wt 111 lb (50.3 kg)  BMI 21.68 kg/m2    General Appearance:   Awake, Alert, No acute distress.   HEENT:  Pupil equal and reactive to light. No scleral icterus; the mucous membranes were moist.   Neck: Right cervical bruits. No JVD. No thyromegaly.     Chest: The spine was straight. The chest was symmetric.   Lungs:   Respirations unlabored; Lungs are clear to auscultation. No crackles. No wheezing.   Cardiovascular:   Regular rhythm and rate, normal first and second heart sounds with I/VI systolic murmurs at RUSB. No rubs or gallops.    Abdomen:  Soft. No tenderness. Non-distended. Bowels sounds are present   Extremities: Equal tibial pulses. No leg edema.   Skin: No rashes or ulcers. Warm,  Dry.   Musculoskeletal: No tenderness. No deformity.   Neurologic: Mood and affect are appropriate. No focal deficits.         EKG: Personally reviewed  Normal sinus rhythm   Low voltage QRS   Borderline ECG   When compared with ECG of 24-NOV-2014 09:02,   Nonspecific T wave abnormality no longer evident in Lateral leads    Cardiac Imaging Studies  ECHO on 8-:  Summary   Normal left ventricular size and systolic function.   Left ventricular ejection fraction is visually estimated to be 55-60%.   E/A flow reversal noted. Suggestive of diastolic dysfunction.   Myxomatous degeneration of mitral valve.   Bileaflet mitral valve prolapse   Moderate mitral regurgitation.Multiple jets observed   ABBIE may be helpful to better define the degree of mitral insufficiency    Coronary CT angiogram on 9-1-2016:  CONCLUSION:    1.  3 mm left upper lobe nodule. Follow-up recommendations are detailed below.  2.  Suspect old infarction of the left ventricular apex and apical septum.  3.  Small hiatal hernia.  4.  Please refer to cardiologist's dictation for the cardiac CT report.    Carotid artery US on 9-1-2017:  CONCLUSION:  1.  Mild atheromatous plaque in the left carotid bifurcation.  2.  Slightly elevated left ICA to CCA ratio, suggesting moderate stenosis (estimated 50-69% diameter narrowing by NASCET criteria).   3.  Mild dampening of the waveforms in the common carotid arteries, possibly representing atherosclerotic disease at the origin of the vessels.    Lab Review   Lab Results   Component Value Date     08/09/2018    K 3.5 08/09/2018     08/09/2018    CO2 26 08/09/2018    BUN 13 08/09/2018    CREATININE 0.72 08/09/2018    CALCIUM 9.5 08/09/2018     Lab Results   Component Value Date    WBC 5.1 11/20/2014    HGB 13.5 07/13/2017    HCT 34.4 (L) 11/20/2014    MCV 92 11/20/2014     11/20/2014     Lab Results   Component Value Date    CHOL 190 08/09/2018    CHOL 158 06/22/2017    CHOL 264 (H) 06/17/2016      Lab Results   Component Value Date    HDL 77 08/09/2018    HDL 60 06/22/2017    HDL 64 06/17/2016     Lab Results   Component Value Date    LDLCALC 100 08/09/2018    LDLCALC 83 06/22/2017    LDLCALC 182 (H) 06/17/2016     Lab Results   Component Value Date    TRIG 67 08/09/2018    TRIG 74 06/22/2017    TRIG 92 06/17/2016

## 2021-06-29 NOTE — PROGRESS NOTES
"Progress Notes by Scotty Jimenez MD at 4/29/2020  8:50 AM     Author: Scotty Jimenez MD Service: -- Author Type: Physician    Filed: 4/29/2020  9:02 AM Encounter Date: 4/29/2020 Status: Signed    : Scotty Jimenez MD (Physician)           The patient has been notified of following:     \"This video visit will be conducted via a call between you and your physician/provider. We have found that certain health care needs can be provided without the need for an in-person physical exam.  This service lets us provide the care you need with a video conversation.  If a prescription is necessary we can send it directly to your pharmacy.  If lab work is needed we can place an order for that and you can then stop by our lab to have the test done at a later time.      Patient has given verbal consent to a Video visit? Yes    HEART CARE VIDEO ENCOUNTER        The patient has chosen to have the visit conducted as a video visit, to reduce risk of exposure given the current status of Coronavirus in our community. This video visit is being conducted via a call between the patient and physician/provider. Health care needs are being provided without a physical exam.     Assessment/Recommendations   Assessment/Plan:  1.  Calcified coronary atherosclerosis: The patient had no chest pain or shortness of breath.  She had a coronary CT angiogram in 2016 which was reported minimal coronary artery disease.  Continue risk of factor control.     2.  Chronic diastolic congestive heart failure: The patient is compensated well.  No signs of fluid retention.  Continue Lasix 20 mg as needed.     3.  Hyperlipidemia: Continue Lipitor 20 mg at bedtime.  Her LDL was controlled.     4.  Minimal carotid artery stenosis: Observe it.    Follow Up Plan: 12 months  I have reviewed the note as documented.  This accurately captures the substance of my conversation with the patient.    Total time of video between patient and provider was 7 minutes   Start Time: 8:52 " am   Stop Time: 8:59 am    Originating Location (pt. Location): Home    Distant Location (provider location):  [facility] HEART CARE      Mode of Communication:  Video Conference via [email or web address]       History of Present Illness/Subjective    Cindy Beltrán is a 65 y.o. female who is being evaluated via a billable video visit and has consented to a video visit. Cindy Beltrán has a history of calcified coronary atherosclerosis, mild carotid artery stenosis, chronic diastolic CHF, and hyperlipidemia.     The patient states that she has been doing quite well over last year.  She has been taking Lasix 20 mg as needed, not every day.  Her weight has been stable.  She had no shortness of breath on exertion, no shortness of breath at rest.  She had no chest pain.  She had no palpitations, lightheadedness dizziness orthopnea PND or leg edema.  Her blood pressure and heart rate are controlled well.      I have reviewed and updated the patient's Past Medical History, Social History, Family History and Medication List.     Physical Examination performed via live video encounter Review of Systems   General Appearance:   no distress, normal body habitus, upright.   ENT/Mouth: membranes moist, no nasal discharge or bleeding gums.  Normal head shape, no evidence of injury or laceration.     EYES:  no scleral icterus, normal conjunctivae   Neck: no evidence of thyromegaly.  Supple   Chest/Lungs:   No audible wheezing equal chest wall expansion. Non labored breathing.  No cough.   Cardiovascular:   No evidence of elevated jugular venous pressure.  No evidence of pitting edema bilaterally    Abdomen:  no evidence of abdominal distention. No observe juandice.     Extremities: no cyanosis or clubbing noted.    Skin: no xanthelasma, normal skin collar. No evidence of facial lacerations.      Neurologic: Normal arm motion bilateral, no tremors.  No evidence of focal defect.       Psychiatric: alert and oriented x3, calm                                                Medical History  Surgical History Family History Social History   Past Medical History:   Diagnosis Date   ? Anxiety    ? Back pain    ? Cardiomyopathy (H)    ? CHF (congestive heart failure) (H)    ? Coronary atherosclerosis due to calcified coronary lesion 12/4/2017   ? Depression    ? GI bleed 2010    per pt hx   ? Hypertension    ? Osteoporosis     Past Surgical History:   Procedure Laterality Date   ? BREAST BIOPSY Right     benign    ? COLPOSCOPY     ? dental implant  2012   ? HIP ARTHROSCOPY W/ LABRAL REPAIR Right 2012   ? LAMINECTOMY AND MICRODISCECTOMY LUMBAR SPINE  12/17/2014    Procedure: Decpmpression Lumbar Minimally invasive once level; surgeon: Tripp Clifton MD; [facility]   ? OTHER SURGICAL HISTORY  2004    Pr Biopsy/Excise Cervical Lesion; Polyps   ? TUMOR REMOVAL  1987    Parotid Gland, Benign   ? Xr Mammo Bilateral Diagnostic (Ia)  03/2009    Family History   Problem Relation Age of Onset   ? Alcohol abuse Mother    ? Heart disease Mother    ? Hypertension Mother    ? Hyperlipidemia Mother    ? Heart disease Father    ? Hypertension Father    ? Hyperlipidemia Father    ? Alcohol abuse Sister    ? Breast cancer Sister    ? Alcohol abuse Brother         age 50   ? Drug abuse Son    ? Breast cancer Maternal Aunt         x3, ages 60's   ? Colon cancer Paternal Uncle       Social History     Socioeconomic History   ? Marital status:      Spouse name: Not on file   ? Number of children: Not on file   ? Years of education: Not on file   ? Highest education level: Not on file   Occupational History   ? Occupation: respiratory therapist     Employer: [employer]   Social Needs   ? Financial resource strain: Not on file   ? Food insecurity     Worry: Not on file     Inability: Not on file   ? Transportation needs     Medical: Not on file     Non-medical: Not on file   Tobacco Use   ? Smoking status: Never Smoker   ? Smokeless tobacco: Never Used    Substance and Sexual Activity   ? Alcohol use: No     Comment: stop drinking in [facility] 2004   ? Drug use: No   ? Sexual activity: Yes     Partners: Male   Lifestyle   ? Physical activity     Days per week: Not on file     Minutes per session: Not on file   ? Stress: Not on file   Relationships   ? Social connections     Talks on phone: Not on file     Gets together: Not on file     Attends [employer] service: Not on file     Active member of club or organization: Not on file     Attends meetings of clubs or organizations: Not on file     Relationship status: Not on file   ? Intimate partner violence     Fear of current or ex partner: Not on file     Emotionally abused: Not on file     Physically abused: Not on file     Forced sexual activity: Not on file   Other Topics Concern   ? Not on file   Social History Narrative   ? Not on file          Medications  Allergies   Current Outpatient Medications   Medication Sig Dispense Refill   ? aspirin 81 MG EC tablet Take 81 mg by mouth daily.     ? atorvastatin (LIPITOR) 20 MG tablet Take 1 tablet (20 mg total) by mouth daily. 90 tablet 1   ? carvedilol (COREG) 25 MG tablet TAKE 1 TABLET BY MOUTH TWICE DAILY WITH MEALS (Patient taking differently: Take 25 mg by mouth at bedtime. ) 180 tablet 2   ? cholecalciferol, vitamin D3, 1,000 unit (25 mcg) tablet Take 1,000 Units by mouth daily.     ? methylphenidate HCl (CONCERTA) 54 MG CR tablet One daily 30 tablet 0   ? pregabalin (LYRICA) 200 MG capsule TAKE 1 CAPSULE BY MOUTH THREE TIMES DAILY 90 capsule 2   ? traZODone (DESYREL) 100 MG tablet One at bedtime 90 tablet 1   ? zolpidem (AMBIEN) 10 mg tablet TAKE 1 TABLET(10 MG) BY MOUTH AT BEDTIME 90 tablet 1   ? furosemide (LASIX) 20 MG tablet TAKE 1 TABLET BY MOUTH DAILY 90 tablet 1   ? methylphenidate HCl (CONCERTA) 54 MG CR tablet Take 1 tablet (54 mg total) by mouth daily. 30 tablet 0     No current facility-administered medications for this visit.     No Known Allergies      Lab  Results    Chemistry/lipid CBC Cardiac Enzymes/BNP/TSH/INR   Lab Results   Component Value Date    CHOL 190 08/09/2018    HDL 77 08/09/2018    LDLCALC 100 08/09/2018    TRIG 67 08/09/2018    CREATININE 0.72 08/09/2018    BUN 13 08/09/2018    K 3.5 08/09/2018     08/09/2018     08/09/2018    CO2 26 08/09/2018    Lab Results   Component Value Date    WBC 5.1 11/20/2014    HGB 13.5 07/13/2017    HCT 34.4 (L) 11/20/2014    MCV 92 11/20/2014     11/20/2014    Lab Results   Component Value Date    CKMB <1 08/10/2010    TROPONINI <0.01 11/18/2014    INR 0.93 08/10/2010        Scotty Jimenez

## 2021-07-01 ENCOUNTER — COMMUNICATION - HEALTHEAST (OUTPATIENT)
Dept: FAMILY MEDICINE | Facility: CLINIC | Age: 66
End: 2021-07-01

## 2021-07-01 DIAGNOSIS — F51.04 PSYCHOPHYSIOLOGICAL INSOMNIA: ICD-10-CM

## 2021-07-01 DIAGNOSIS — F98.8 ADD (ATTENTION DEFICIT DISORDER) WITHOUT HYPERACTIVITY: ICD-10-CM

## 2021-07-03 NOTE — ADDENDUM NOTE
Addendum Note by Alina Caceres MD at 6/27/2017  1:21 PM     Author: Alina Caceres MD Service: -- Author Type: Physician    Filed: 6/27/2017  1:21 PM Encounter Date: 6/27/2017 Status: Signed    : Alina Caceres MD (Physician)    Addended by: ALINA CACERES on: 6/27/2017 01:21 PM        Modules accepted: Orders

## 2021-07-03 NOTE — ADDENDUM NOTE
Addendum Note by Lissette Thurman at 9/6/2019  8:59 AM     Author: Lissette Thurman Service: -- Author Type: --    Filed: 9/6/2019  8:59 AM Encounter Date: 9/2/2019 Status: Signed    : Lissette Thurman    Addended by: LISSETTE THURMAN on: 9/6/2019 08:59 AM        Modules accepted: Orders

## 2021-07-03 NOTE — ADDENDUM NOTE
Addendum Note by Alina Caceres MD at 9/5/2017  1:41 PM     Author: Alina Caceres MD Service: -- Author Type: Physician    Filed: 9/5/2017  1:41 PM Encounter Date: 9/5/2017 Status: Signed    : Alina Caceres MD (Physician)    Addended by: ALINA CACERES on: 9/5/2017 01:41 PM        Modules accepted: Orders

## 2021-07-04 NOTE — PROGRESS NOTES
Progress Notes by Allie Garcia, RN at 6/2/2021  9:15 AM     Author: Allie Garcia RN Service: -- Author Type: Registered Nurse    Filed: 6/30/2021  9:03 AM Encounter Date: 6/2/2021 Status: Signed    : Allie Garcia RN (Registered Nurse)       Patient due for pap  Reminder call done today.     Left message

## 2021-07-04 NOTE — TELEPHONE ENCOUNTER
Telephone Encounter by Avril Hernandez MD at 7/1/2021  4:09 PM     Author: Avril Hernandez MD Service: -- Author Type: Physician    Filed: 7/1/2021  4:10 PM Encounter Date: 7/1/2021 Status: Signed    : Avril Hernandez MD (Physician)       Total Private Pay: 0     Fill Date ID Written Sold Drug Qty Days Prescriber Rx # Pharmacy Refill Daily Dose * Pymt Type    06/23/2021  2  05/26/2021 06/25/2021  Pregabalin 200 Mg Capsule    90.00 30 Ma Win  [id number]  [facility] (5605[place]  1/4 4.02 [place]   06/04/2021  2  06/04/2021 06/05/2021  Concerta Er 54 Mg Tablet    30.00 30 Ma Win  [id number]  [facility] (5605[place]  0[place]   05/26/2021  2  05/26/2021 05/28/2021  Pregabalin 200 Mg Capsule    90.00 30 Ma Win  [id number]  [facility] (5605[place]  0/4 4.02 [place]   05/04/2021  2  05/04/2021 05/04/2021  Concerta Er 54 Mg Tablet    30.00 30 Ma Win  [id number]  Wal (5605)  0/0  Comm [facility]   04/19/2021  2  02/23/2021 04/20/2021  Pregabalin 200 Mg Capsule    90.00 30 Ma Win  [id number]  Wal (5605)  2/2 4.02 LME Comm [facility]   04/12/2021  2  04/12/2021 04/13/2021  Zolpidem Tartrate 10 Mg Tablet    90.00 [address]  [id number]  [facility] (5605[place]  0/0 0.50 [place]   04/06/2021  2  04/05/2021 04/06/2021  Concerta Er 54 Mg Tablet    30.00 30 Da Lar  [id number]  Wal (5605)  0/0  Comm [facility]   03/22/2021  2  02/23/2021 03/22/2021  Pregabalin 200 Mg Capsule    90.00 30 Ma Win  [id number]  [facility] (5605)  1/2 4.02 [place]   03/07/2021  2  03/02/2021 03/07/2021  Concerta Er 54 Mg Tablet    30.00 30 Ma Win  [id number]  [place] (5605[place]  [place]   02/23/2021  2  02/23/2021 02/23/2021  Pregabalin 200 Mg Capsule    90.00 30 Ma Win  [id number]  [facility] (5605[place]  0/2 4.02 [place]   02/05/2021  2  02/01/2021 02/05/2021  Concerta Er 54 Mg Tablet    30.00 30 Ma Win  [id number]  [place] (5605[place]  0/[place]  [place]

## 2021-07-04 NOTE — TELEPHONE ENCOUNTER
Telephone Encounter by Jose Rea RN at 7/1/2021 12:39 PM     Author: Jose Rea RN Service: -- Author Type: Registered Nurse    Filed: 7/1/2021 12:40 PM Encounter Date: 7/1/2021 Status: Signed    : Jose Rea RN (Registered Nurse)       Controlled Substance Refill Request  Medication Name:   Requested Prescriptions     Pending Prescriptions Disp Refills   ? zolpidem (AMBIEN) 10 mg tablet 90 tablet 0   ? methylphenidate HCl 54 MG CR tablet 30 tablet 0     Sig: Take 1 tablet (54 mg total) by mouth daily.     Date Last Fill: 4/12/21, 6/4/21  Requested Pharmacy: [facility]  Submit electronically to pharmacy  Controlled Substance Agreement on file:   Encounter-Level CSA Scan Date - 08/09/2018:    Scan on 8/13/2018 12:47 PM        Last office visit:  4/23/21

## 2021-07-04 NOTE — TELEPHONE ENCOUNTER
Telephone Encounter by Marizol Bray CMA at 6/4/2021 12:06 PM     Author: Marizol Bray CMA Service: -- Author Type: Certified Medical Assistant    Filed: 6/4/2021 12:09 PM Encounter Date: 6/3/2021 Status: Signed    : Marizol Bray CMA (Certified Medical Assistant)       Medication: methylphenidate  Last Date Filled 5/4   pulled: YES           Only PCP Prescribing? : YES  Taken as prescribed from physician notes? YES    CSA in last year: YES  Random Utox in last year: YES  (if any of the above answer NO - schedule with PCP)     Opioids + benzodiazepines? NO  (if the above answer YES - schedule with PCP every 6 months)     Is patient on the Executive Review Team? no      All responses suggest: Refilling prescription

## 2021-07-30 ENCOUNTER — MYC MEDICAL ADVICE (OUTPATIENT)
Dept: FAMILY MEDICINE | Facility: CLINIC | Age: 66
End: 2021-07-30

## 2021-07-30 DIAGNOSIS — F90.0 ATTENTION DEFICIT HYPERACTIVITY DISORDER (ADHD), PREDOMINANTLY INATTENTIVE TYPE: Primary | ICD-10-CM

## 2021-07-30 RX ORDER — METHYLPHENIDATE HYDROCHLORIDE 54 MG/1
54 TABLET, EXTENDED RELEASE ORAL
COMMUNITY
Start: 2021-02-05 | End: 2021-07-30

## 2021-07-30 RX ORDER — METHYLPHENIDATE HYDROCHLORIDE 54 MG/1
54 TABLET, EXTENDED RELEASE ORAL EVERY MORNING
Qty: 30 TABLET | Refills: 0 | Status: SHIPPED | OUTPATIENT
Start: 2021-08-03 | End: 2021-11-11

## 2021-07-30 NOTE — CONFIDENTIAL NOTE
CSA signed 5/4/21    Total Private Pay: 0    Fill Date ID   Written Drug Qty Days Prescriber Rx # Pharmacy Refill   Daily Dose* Pymt Type      07/03/2021  2   07/01/2021  Zolpidem Tartrate 10 MG Tablet  90.00  90 Ma Win   [id number]   [facility] (5605)   0  0.50 [place]   07/03/2021  2   07/01/2021  Concerta Er 54 MG Tablet  30.00  30 Ma Win   [id number]   [place] (5605[place]   0   [place]   06/23/2021  2   05/26/2021  Pregabalin 200 MG Capsule  90.00  30 Ma Win   [id number]   Wal (5605)   1  4.02 [place]   06/04/2021  2   06/04/2021  Concerta Er 54 MG Tablet  30.00  30 Ma Win   [id number]   [place] (5605[place]   0   [place]   05/26/2021  2   05/26/2021  Pregabalin 200 MG Capsule  90.00  30 Ma Win   [id number]   Wal (5605)   0  4.02 [place]   05/04/2021  2   05/04/2021  Concerta Er 54 MG Tablet  30.00  30 Ma Win   [id number]   [place] (5605[place]   0   [place]   04/19/2021  2   02/23/2021  Pregabalin 200 MG Capsule  90.00  30 Ma Win   [id number]   Wal (5605)   2  4.02 [place]   04/12/2021  2   04/12/2021  Zolpidem Tartrate 10 MG Tablet  90.00  [address]   [id number]   Wal (5605)   0  0.50 [place]         Last Rx:  Outpatient Medication Detail     Disp Refills Start End MITCH   methylphenidate HCl 54 MG CR tablet 30 tablet 0 7/3/2021 8/3/2021 No   Sig - Route: Take 1 tablet (54 mg total) by mouth daily. 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

## 2021-08-03 ENCOUNTER — TELEPHONE (OUTPATIENT)
Dept: FAMILY MEDICINE | Facility: CLINIC | Age: 66
End: 2021-08-03

## 2021-08-03 DIAGNOSIS — F90.0 ATTENTION DEFICIT HYPERACTIVITY DISORDER (ADHD), PREDOMINANTLY INATTENTIVE TYPE: Primary | ICD-10-CM

## 2021-08-03 NOTE — TELEPHONE ENCOUNTER
Reason for Call:  Medication or medication refill:    Do you use a [facility]?  Name of the pharmacy and phone number for the current request:  [facility]    Name of the medication requested: the rx for her   Methylphenidate HCl (METHYLPHENIDATE not being covered by insurance. Something was sent incorrectly. [facility] is asking pt to call us to get something changed.  Other request:     Can we leave a detailed message on this number? YES    Phone number patient can be reached at: Cell number on file:    Telephone Information:   Mobile [phone]       Best Time:     Call taken on 8/3/2021 at 11:50 AM by Ying Loving

## 2021-08-04 NOTE — TELEPHONE ENCOUNTER
I spoke with the pharmacy and they notified me that the patient has already picked up her medication.    Since the rx was sent as Methylphenidate she had to pay $148 out of pocket. Patient paid cash. Last month the rx was sent as Concerta which was a $0.00 copay. Her insurance covers Concerta and not Methylphenidate. I will inform Dr. Hernandez and the Patient of this so they are aware moving forward.     GF/RMA

## 2021-08-05 RX ORDER — METHYLPHENIDATE HYDROCHLORIDE 54 MG/1
54 TABLET ORAL EVERY MORNING
Qty: 30 TABLET | Refills: 0 | Status: SHIPPED | OUTPATIENT
Start: 2021-08-05 | End: 2021-09-02

## 2021-08-05 NOTE — TELEPHONE ENCOUNTER
I spoke with the patient and informed her of my discussion the the pharmacy. Patient understood and she said that because she was going out of town she had to pay out of pocket since she needed her medication. Patient only paid for a 10 day supply. Patient will need a script sent over to her pharmacy. She will need Rx Concerta. Informed patient that I will forward this request to Dr. Hernandez to address. Patient understood.    GF/RMA

## 2021-08-05 NOTE — CONFIDENTIAL NOTE
"I have tried to put in \"concerta\" and it always goes out as methylphenidate \"CONCERTA\")  - I will have to specify brand name  "

## 2021-09-02 ENCOUNTER — MYC REFILL (OUTPATIENT)
Dept: FAMILY MEDICINE | Facility: CLINIC | Age: 66
End: 2021-09-02

## 2021-09-02 DIAGNOSIS — F90.0 ATTENTION DEFICIT HYPERACTIVITY DISORDER (ADHD), PREDOMINANTLY INATTENTIVE TYPE: ICD-10-CM

## 2021-09-03 NOTE — TELEPHONE ENCOUNTER
Routing refill request to provider for review/approval because:  Controlled substance request    Last Written Prescription Date:  8/5/21  Last Fill Quantity: 30,  # refills: 0   Last office visit provider:  4/23/21     Requested Prescriptions   Pending Prescriptions Disp Refills     methylphenidate (CONCERTA) 54 MG CR tablet 30 tablet 0     Sig: Take 1 tablet (54 mg) by mouth every morning BRAND NAME CONCERTA as covered by insruance- not generic       There is no refill protocol information for this order          Jose Rea RN 09/03/21 10:57 AM

## 2021-09-07 RX ORDER — METHYLPHENIDATE HYDROCHLORIDE 54 MG/1
54 TABLET, EXTENDED RELEASE ORAL EVERY MORNING
Qty: 30 TABLET | Refills: 0 | Status: SHIPPED | OUTPATIENT
Start: 2021-09-07 | End: 2021-11-02

## 2021-09-07 NOTE — TELEPHONE ENCOUNTER
Medication: Concerta ER 54 mg  Last Date Filled 8/8/21   pulled: YES         Only PCP Prescribing? : YES  Taken as prescribed from physician notes? YES    CSA in last year: YES  Random Utox in last year: YES  Opioids + benzodiazepines? NO    Is patient on the Executive Review Team? NO      All responses suggest: Refilling prescription

## 2021-09-30 DIAGNOSIS — F51.01 PRIMARY INSOMNIA: Primary | ICD-10-CM

## 2021-10-01 NOTE — TELEPHONE ENCOUNTER
Disp Refills Start End MITCH   zolpidem (AMBIEN) 10 mg tablet 90 tablet 0 7/3/2021  No   Sig - Route: Take 1 tablet (10 mg total) by mouth at bedtime. - Oral   Sent to pharmacy as: zolpidem 10 mg tablet (AMBIEN)   E-Prescribing Status: Receipt confirmed by pharmacy (7/1/2021  4:10 PM CDT)     Routing refill request to provider for review/approval because:  Controlled Substance Request    Last Written Prescription Date: 07/03/2021   Last Fill Quantity: 90,  # refills: 0   Last office visit provider:  04/23/2021 with Dr Hernandez.     Requested Prescriptions   Pending Prescriptions Disp Refills     zolpidem (AMBIEN) 10 MG tablet [Pharmacy Med Name: ZOLPIDEM 10MG TABLETS] 90 tablet      Sig: TAKE 1 TABLET(10 MG) BY MOUTH AT BEDTIME       There is no refill protocol information for this order          Nemo Morgan 10/01/21 4:03 PM

## 2021-10-02 ENCOUNTER — HEALTH MAINTENANCE LETTER (OUTPATIENT)
Age: 66
End: 2021-10-02

## 2021-10-02 ENCOUNTER — MYC MEDICAL ADVICE (OUTPATIENT)
Dept: FAMILY MEDICINE | Facility: CLINIC | Age: 66
End: 2021-10-02

## 2021-10-02 DIAGNOSIS — F51.01 PRIMARY INSOMNIA: Primary | ICD-10-CM

## 2021-10-03 ENCOUNTER — MYC REFILL (OUTPATIENT)
Dept: FAMILY MEDICINE | Facility: CLINIC | Age: 66
End: 2021-10-03

## 2021-10-03 DIAGNOSIS — F90.0 ATTENTION DEFICIT HYPERACTIVITY DISORDER (ADHD), PREDOMINANTLY INATTENTIVE TYPE: ICD-10-CM

## 2021-10-04 NOTE — TELEPHONE ENCOUNTER
Routing refill request to provider for review/approval because:  Controlled substance request    Last Written Prescription Date:  9/7/21  Last Fill Quantity: 30,  # refills: 0   Last office visit provider:  4/23/21     Requested Prescriptions   Pending Prescriptions Disp Refills     CONCERTA 54 MG CR tablet 30 tablet 0     Sig: Take 1 tablet (54 mg) by mouth every morning BRAND NAME CONCERTA as covered by insruance- not generic       There is no refill protocol information for this order          patti segundo RN 10/03/21 10:12 PM

## 2021-10-04 NOTE — TELEPHONE ENCOUNTER
Medication: Zolpidem 10 mg   Last Date Filled 7/3/21   pulled: PROVIDER TO PULL FROM Epic.     Only PCP Prescribing? PROVIDER TO PULL  FROM Epic.  Taken as prescribed from physician notes? YES    CSA in last year: YES  Random Utox in last year: n/a  Opioids + benzodiazepines? YES    Is patient on the Executive Review Team?NO    Patient last saw Dr. Hernandez on 4/23/21.

## 2021-10-04 NOTE — TELEPHONE ENCOUNTER
Medication: Concerta 54 mg  Last Date Filled 9/7/21   pulled: PROVIDER TO PULL FROM Epic.     Only PCP Prescribing? PROVIDER TO PULL  FROM Epic.  Taken as prescribed from physician notes? YES    CSA in last year: YES  Random Utox in last year: YES  Opioids + benzodiazepines? NO     Patient's last visit with Dr. Hernandez was 4/23/21.    Is patient on the Executive Review Team? NO

## 2021-10-05 ENCOUNTER — MYC MEDICAL ADVICE (OUTPATIENT)
Dept: FAMILY MEDICINE | Facility: CLINIC | Age: 66
End: 2021-10-05

## 2021-10-05 DIAGNOSIS — F10.21 ALCOHOL DEPENDENCE IN REMISSION (H): Primary | ICD-10-CM

## 2021-10-05 DIAGNOSIS — F90.0 ATTENTION DEFICIT HYPERACTIVITY DISORDER (ADHD), PREDOMINANTLY INATTENTIVE TYPE: ICD-10-CM

## 2021-10-05 RX ORDER — ZOLPIDEM TARTRATE 10 MG/1
TABLET ORAL
Qty: 90 TABLET | Refills: 0 | Status: SHIPPED | OUTPATIENT
Start: 2021-10-05 | End: 2021-12-30

## 2021-10-05 RX ORDER — ZOLPIDEM TARTRATE 10 MG/1
10 TABLET ORAL AT BEDTIME
COMMUNITY
Start: 2021-07-03 | End: 2021-11-11

## 2021-10-05 RX ORDER — METHYLPHENIDATE HYDROCHLORIDE 54 MG/1
54 TABLET ORAL EVERY MORNING
Qty: 30 TABLET | Refills: 0 | Status: SHIPPED | OUTPATIENT
Start: 2021-10-07 | End: 2021-11-11

## 2021-10-05 RX ORDER — METHYLPHENIDATE HYDROCHLORIDE 54 MG/1
54 TABLET, EXTENDED RELEASE ORAL EVERY MORNING
Qty: 30 TABLET | Refills: 0
Start: 2021-10-05

## 2021-10-05 NOTE — CONFIDENTIAL NOTE
Has appointment with me 10/14/21  Not due until 10/23/21 - but seems to be 3 days early every month  I would rather discuss this when she comes in    09/23/2021  2   05/26/2021  Pregabalin 200 MG Capsule    90.00  30 Ma Win   [id number]   [facility] ([facility]   4/4  4.02 L[place]   09/07/2021  2   09/07/2021  Concerta Er 54 MG Tablet    30.00  30 Ma Win   [id number]   [facility] ([facility]605[place]   [place]   08/27/2021  2   05/26/2021  Pregabalin 200 MG Capsule    90.00  30 Ma Win   [id number]   [facility] (5605)   3/4  4.02 [place]   08/07/2021  2   08/05/2021  Concerta Er 54 MG Tablet    30.00  30 Ma Win   [id number]   [facility] ([facility]605[place]   [place]   08/03/2021  2   07/30/2021  Methylphenidate Er 54 MG Tab    10.00  10 Ma Win   [id number]   [facility] ([facility]605[place]   [place]   07/30/2021  2   05/26/2021  Pregabalin 200 MG Capsule    90.00  30 Ma Win   [id number]   [facility] (5605)   2/4  4.02 [place]   07/03/2021  2   07/01/2021  Zolpidem Tartrate 10 MG Tablet    90.00  90 Ma Win   [id number]   [facility] (5605)   0/0  0.50 [place]   07/03/2021  2   07/01/2021  Concerta Er 54 MG Tablet    30.00  30 Ma Win   [id number]   [facility] (5605[place]   [place]   06/23/2021  2   05/26/2021  Pregabalin 200 MG Capsule    90.00  30 Ma Win   [id number]   [facility] (5605)   1/4  4.02 [place]   06/04/2021  2   06/04/2021  Concerta Er 54 MG Tablet    30.00  30 Ma Win   [id number]   [facility] [place]605[place]   [place]   05/26/2021  2   05/26/2021  Pregabalin 200 MG Capsule    90.00  30 Ma Win   [id number]   [facility] (5605)   0/4  4.02 [place]   05/04/2021  2   05/04/2021  Concerta Er 54 MG Tablet    30.00  30 Ma Win   [id number]   [facility] [place]605[place]   [place]   04/19/2021  2   02/23/2021  Pregabalin 200 MG Capsule    90.00  30 Ma Win   [id number]   [facility] (5605[facility]   2/2  4.02 [place]   04/12/2021  2   04/12/2021  Zolpidem Tartrate 10 MG Tablet    90.00  90 Je Christi   [id number]   [facility] (5605)   0/0  0.50 [place]   04/06/2021  2    04/05/2021  Concerta Er 54 MG Tablet    30.00  30 Da Lar   [id number]   [facility] (5605)   0/0   [place]   03/22/2021  2   02/23/2021  Pregabalin 200 MG Capsule    90.00  30 Ma Win   [id number]   [facility] (5605)   1/2  4.02 LME  Comm Ins

## 2021-10-05 NOTE — TELEPHONE ENCOUNTER
Disp Refills Start End MITCH    zolpidem (AMBIEN) 10 mg tablet 90 tablet 0 7/3/2021  No   Sig - Route: Take 1 tablet (10 mg total) by mouth at bedtime. - Oral   Sent to pharmacy as: zolpidem 10 mg tablet (AMBIEN)   E-Prescribing Status: Receipt confirmed by pharmacy (7/1/2021  4:10 PM CDT)     09/23/2021  2   05/26/2021  Pregabalin 200 MG Capsule    90.00  30 Ma Win   [id number]   [facility] (5605[place]   4/[place]  4.[place]   09/07/2021  2   09/07/2021  Concerta Er 54 MG Tablet    30.00  30 Ma Win   [id number]   [facility] ([facility]605[place]   [place]   08/27/2021  2   05/26/2021  Pregabalin 200 MG Capsule    90.00  30 Ma Win   [id number]   [facility] (5605)   3/4  4.02 [place]   08/07/2021  2   08/05/2021  Concerta Er 54 MG Tablet    30.00  30 Ma Win   [id number]   [facility] ([facility]605[place]   [place]   08/03/2021  2   07/30/2021  Methylphenidate Er 54 MG Tab    10.00  10 Ma Win   [id number]   [facility] ([facility]605[place]   [place]   07/30/2021  2   05/26/2021  Pregabalin 200 MG Capsule    90.00  30 Ma Win   [id number]   [facility] (5605)   2/4  4.02 [place]   07/03/2021  2   07/01/2021  Zolpidem Tartrate 10 MG Tablet    90.00  90 Ma Win   [id number]   [facility] (5605)   0/0  0.50 [place]   07/03/2021  2   07/01/2021  Concerta Er 54 MG Tablet    30.00  30 Ma Win   [id number]   [facility] (5605[place]   [place]   06/23/2021  2   05/26/2021  Pregabalin 200 MG Capsule    90.00  30 Ma Win   [id number]   [facility] (5605)   1/4  4.02 [place]   06/04/2021  2   06/04/2021  Concerta Er 54 MG Tablet    30.00  30 Ma Win   [id number]   [facility] (5605[place]   [place]   [place]   05/26/2021  2   05/26/2021  Pregabalin 200 MG Capsule    90.00  30 Ma Win   [id number]   [facility] (5605)   0/4  4.02 [place]   05/04/2021  2   05/04/2021  Concerta Er 54 MG Tablet    30.00  30 Ma Win   [id number]   [facility] ([facility]605[place]   [place]   04/19/2021  2   02/23/2021  Pregabalin 200 MG Capsule    90.00  30 Ma Win   [id number]   [facility] (5605)   2/2  4.02 [place]   04/12/2021  2    04/12/2021  Zolpidem Tartrate 10 MG Tablet    90.00  90 Everton Barraza   [id number]   [facility] (5605)   0/0  0.50 L[place]

## 2021-10-06 RX ORDER — ZOLPIDEM TARTRATE 10 MG/1
TABLET ORAL
Qty: 90 TABLET | Refills: 0 | OUTPATIENT
Start: 2021-10-06

## 2021-10-07 NOTE — CONFIDENTIAL NOTE
Already done       Disp Refills Start End MITCH   zolpidem (AMBIEN) 10 MG tablet 90 tablet 0 10/5/2021  No   Sig: TAKE 1 TABLET(10 MG) BY MOUTH AT BEDTIME   Sent to pharmacy as: Zolpidem Tartrate 10 MG Oral Tablet (AMBIEN)   Class: E-Prescribe   Order: [id number]   E-Prescribing Status: Receipt confirmed by pharmacy (10/5/2021  1:05 PM CDT)

## 2021-10-20 DIAGNOSIS — G89.29 CHRONIC PAIN: Primary | ICD-10-CM

## 2021-10-21 RX ORDER — PREGABALIN 200 MG/1
CAPSULE ORAL
Qty: 90 CAPSULE | Refills: 0 | Status: SHIPPED | OUTPATIENT
Start: 2021-10-23 | End: 2021-11-11

## 2021-10-21 NOTE — TELEPHONE ENCOUNTER
Disp Refills Start End MITCH    pregabalin (LYRICA) 200 MG capsule 90 capsule 4 5/26/2021  No   Sig: TAKE 1 CAPSULE BY MOUTH THREE TIMES DAILY   Sent to pharmacy as: pregabalin 200 mg capsule (LYRICA)   E-Prescribing Status: Receipt confirmed by pharmacy (5/26/2021  4:21 PM CDT)       pregabalin (LYRICA) 200 MG capsule [[id number]]    Electronically signed by: Avril Hernandez MD on 05/26/21 1621 Status: Active   Ordering user: Avril Hernandez MD 05/26/21 1621 Authorized by: Avril Hernandez MD   Frequency:  05/26/21 - Until Discontinued Released by: Avril Hernandez MD 05/26/21 1621   Diagnoses  Other chronic pain [G89.29]     Routing refill request to provider for review/approval because:  Controlled substance request.    Last Written Prescription Date:  5/26/21  Last Fill Quantity: 90,  # refills: 4   Last office visit provider:  4/23/21     Requested Prescriptions   Pending Prescriptions Disp Refills     Pregabalin (LYRICA) 200 MG capsule [Pharmacy Med Name: PREGABALIN 200MG CAPSULES] 90 capsule      Sig: TAKE 1 CAPSULE BY MOUTH THREE TIMES DAILY       There is no refill protocol information for this order          Jen Dan RN 10/21/21 10:18 AM

## 2021-10-21 NOTE — CONFIDENTIAL NOTE
Has appointment with me 11/11/21    CSA  Diagnosis: 1) ADHD 2) insomnia 3) chronic pain  Medication : 1) Methylphenidate 54 mg CR 2) zolpidem 10 mg 3 pregabalin 200 mg tid  Quantity per month: 1) 30) 30 3) 90  Number of refills before follow up visit: 6 months      Disp Refills Start End MITCH   pregabalin (LYRICA) 200 MG capsule 90 capsule 4 5/26/2021  No   Sig: TAKE 1 CAPSULE BY MOUTH THREE TIMES DAILY   Sent to pharmacy as: pregabalin 200 mg capsule (LYRICA)   E-Prescribing Status: Receipt confirmed by pharmacy (5/26/2021  4:21 PM CDT)     Refilled - post dated to October 23

## 2021-10-21 NOTE — TELEPHONE ENCOUNTER
Medication:Lyrica 200 mg  Last Date Filled 9/25/21   pulled:       Only PCP Prescribing? PROVIDER TO PULL  FROM Epic.  Taken as prescribed from physician notes? YES    CSA in last year: YES  Random Utox in last year: NO  Opioids + benzodiazepines? NO    Is patient on the Executive Review Team? NO

## 2021-11-02 ENCOUNTER — MYC REFILL (OUTPATIENT)
Dept: FAMILY MEDICINE | Facility: CLINIC | Age: 66
End: 2021-11-02

## 2021-11-02 DIAGNOSIS — F90.0 ATTENTION DEFICIT HYPERACTIVITY DISORDER (ADHD), PREDOMINANTLY INATTENTIVE TYPE: ICD-10-CM

## 2021-11-04 NOTE — TELEPHONE ENCOUNTER
Routing refill request to provider for review/approval because:  Controlled substance request    Last Written Prescription Date:  9/7/21  Last Fill Quantity: 30,  # refills: 0   Last office visit provider:  4/23/21     Requested Prescriptions   Pending Prescriptions Disp Refills     CONCERTA 54 MG CR tablet 30 tablet 0     Sig: Take 1 tablet (54 mg) by mouth every morning BRAND NAME CONCERTA as covered by insruance- not generic       There is no refill protocol information for this order          Jose Rea RN 11/04/21 8:50 AM

## 2021-11-04 NOTE — TELEPHONE ENCOUNTER
Medication: Concerta 54 mg  Last Date Filled 10/5/21   pulled: PROVIDER TO PULL FROM Epic.     Only PCP Prescribing? PROVIDER TO PULL  FROM [employer].    CSA in last year: YES  Random Utox in last year: NO  Opioids + benzodiazepines? NO     Next appointment with Dr. Hernandez is 11/11/21.      Is patient on the Executive Review Team? NO

## 2021-11-05 RX ORDER — METHYLPHENIDATE HYDROCHLORIDE 54 MG/1
54 TABLET, EXTENDED RELEASE ORAL EVERY MORNING
Qty: 30 TABLET | Refills: 0 | Status: SHIPPED | OUTPATIENT
Start: 2021-11-06 | End: 2021-12-05

## 2021-11-11 ENCOUNTER — OFFICE VISIT (OUTPATIENT)
Dept: FAMILY MEDICINE | Facility: CLINIC | Age: 66
End: 2021-11-11
Payer: COMMERCIAL

## 2021-11-11 VITALS
DIASTOLIC BLOOD PRESSURE: 80 MMHG | HEART RATE: 88 BPM | SYSTOLIC BLOOD PRESSURE: 128 MMHG | WEIGHT: 108 LBS | OXYGEN SATURATION: 98 % | BODY MASS INDEX: 19.08 KG/M2

## 2021-11-11 DIAGNOSIS — Z79.899 CONTROLLED SUBSTANCE AGREEMENT SIGNED: ICD-10-CM

## 2021-11-11 DIAGNOSIS — Z12.31 ENCOUNTER FOR SCREENING MAMMOGRAM FOR BREAST CANCER: ICD-10-CM

## 2021-11-11 DIAGNOSIS — G89.4 CHRONIC PAIN SYNDROME: Primary | ICD-10-CM

## 2021-11-11 DIAGNOSIS — E78.2 MIXED HYPERLIPIDEMIA: ICD-10-CM

## 2021-11-11 DIAGNOSIS — I50.32 CHRONIC DIASTOLIC CHF (CONGESTIVE HEART FAILURE), NYHA CLASS 2 (H): ICD-10-CM

## 2021-11-11 DIAGNOSIS — Z23 HIGH PRIORITY FOR 2019-NCOV VACCINE: ICD-10-CM

## 2021-11-11 DIAGNOSIS — F98.8 ATTENTION DEFICIT DISORDER (ADD) WITHOUT HYPERACTIVITY: ICD-10-CM

## 2021-11-11 DIAGNOSIS — F51.01 PRIMARY INSOMNIA: ICD-10-CM

## 2021-11-11 DIAGNOSIS — M54.12 CERVICAL NEURALGIA: ICD-10-CM

## 2021-11-11 LAB
AMPHETAMINES UR QL SCN: NORMAL
BARBITURATES UR QL: NORMAL
BENZODIAZ UR QL: NORMAL
CANNABINOIDS UR QL SCN: NORMAL
COCAINE UR QL: NORMAL
CREAT UR-MCNC: 21 MG/DL
OPIATES UR QL SCN: NORMAL
OXYCODONE UR QL: NORMAL
PCP UR QL SCN: NORMAL

## 2021-11-11 PROCEDURE — 0004A COVID-19,PF,PFIZER (12+ YRS): CPT | Performed by: FAMILY MEDICINE

## 2021-11-11 PROCEDURE — 91300 COVID-19,PF,PFIZER (12+ YRS): CPT | Performed by: FAMILY MEDICINE

## 2021-11-11 PROCEDURE — 80307 DRUG TEST PRSMV CHEM ANLYZR: CPT | Performed by: FAMILY MEDICINE

## 2021-11-11 PROCEDURE — 99214 OFFICE O/P EST MOD 30 MIN: CPT | Mod: 25 | Performed by: FAMILY MEDICINE

## 2021-11-11 PROCEDURE — 80360 METHYLPHENIDATE: CPT | Mod: 90 | Performed by: FAMILY MEDICINE

## 2021-11-11 PROCEDURE — 90471 IMMUNIZATION ADMIN: CPT | Performed by: FAMILY MEDICINE

## 2021-11-11 PROCEDURE — 90732 PPSV23 VACC 2 YRS+ SUBQ/IM: CPT | Performed by: FAMILY MEDICINE

## 2021-11-11 PROCEDURE — 99000 SPECIMEN HANDLING OFFICE-LAB: CPT | Performed by: FAMILY MEDICINE

## 2021-11-11 RX ORDER — ATORVASTATIN CALCIUM 20 MG/1
20 TABLET, FILM COATED ORAL DAILY
COMMUNITY
Start: 2021-09-23 | End: 2022-03-29

## 2021-11-11 RX ORDER — PREGABALIN 200 MG/1
CAPSULE ORAL
Qty: 90 CAPSULE | Refills: 5 | Status: SHIPPED | OUTPATIENT
Start: 2021-11-22 | End: 2022-05-04

## 2021-11-11 RX ORDER — TRAZODONE HYDROCHLORIDE 100 MG/1
100 TABLET ORAL AT BEDTIME
COMMUNITY
Start: 2021-08-21 | End: 2022-05-24

## 2021-11-11 NOTE — LETTER
My Heart Failure Action Plan   Name: Cindy Beltrán    YOB: 1955   Date: 11/14/2021    My doctor: Avril Hernandez [facility]     [address] [place] [address]  [phone]  My Diagnosis: Preserved (HFp)- EF:Over 50%, diastolic dysfunction, mitral regurgitation   My Exercise Goal: 30 minutes daily  .     My Weight Plan:   Wt Readings from Last 2 Encounters:   11/11/21 49 kg (108 lb)   04/23/21 49.5 kg (109 lb 2 oz)     Weigh yourself daily using the same scale. If you gain more than 2 pounds in 24 hours or 5 pounds in a week use your lasix, and Follow up with PCP or cardiology    My Diet Goal: No added salt    Emergency Room Visits:    Our goal is to improve your quality of life and help you avoid a visit to the emergency room or hospital.  If we work together, we can achieve this goal. But, if you feel you need to call 911 or go to the emergency room, please do so.  If you go to the emergency room, please bring your list of medicines and your daily weight chart with you.       GREEN ZONE     Doing well today    Weight gained is no more than 2 pounds a day or 5 pounds a week.    No swelling in feet, ankles, legs or stomach.    No more swelling than usual.    No more trouble breathing than usual.    No change in my sleep.    No other problems. Actions:    I am doing fine.  I will take my medicine, follow my diet, see my doctor, exercise, and watch for symptoms.           YELLOW ZONE         Having a bad day or flare up    Weight gain of more than 2 pounds in one day or 5 pounds in one week.    New swelling in ankle, leg, knee or thigh.    Bloating in belly, pants feel tighter.    Swelling in hands or face.    Coughing or trouble breathing while walking or talking.    Harder to breathe last night.    Have trouble sleeping, wake up short of breath.    Much more tired than usual.    Not eating.    Pain in my chest or bad leg cramps.    Feel  weak or dizzy. Actions:    I need to take action and call my doctor or nurse today.                 RED ZONE         Need medical care now    Weight gain of 5 pounds overnight.    Chest pain or pressure that does not go away.    Feel less alert.    Wheezing or have trouble breathing when at rest.    Cannot sleep lying down.    Cannot take my water pill.    Pass out or faint. Actions:    I need to call my doctor or nurse now!    Call 911 if I have chest pain or cannot breathe.

## 2021-11-11 NOTE — PROGRESS NOTES
Assessment and Plan    Chronic pain /   Cervical neuralgia  Well controlled on:   - Pregabalin (LYRICA) 200 MG capsule  Dispense: 90 capsule; Refill: 5    ADHD  Well controlled on: Concerta 54 CR mg daily - I can continue to fill this    Insomnia  Controlled on Trazodone 100 mg plus zolpidem 10 mg nightly  - also I can continue to fill this      Controlled substance agreement signed  For above 3 diagnosis - CSA is current a of 5/24/21, but due for   - Urine Drugs of Abuse Screen Panel 1 - Drug Screen (Full)  - [facility]; methylphenidate confirmation (Laboratory Miscellaneous Order)      Diagnosis: 1) ADHD 2) insomnia 3) chronic pain  Medication : 1) Methylphenidate 54 mg CR 2) zolpidem 10 mg 3 pregabalin 200 mg tid  Quantity per month: 1) 30) 30 3) 90  Number of refills before follow up visit: 6 months - [facility]    Chronic diastolic CHF (congestive heart failure), NYHA class 2 (H)  No symptoms .  Take carvedilol and atorvastatin 20 mg daily.  Due for labs in April 2022. Heart Failure action plan done    Mixed hyperlipidemia  Monitor with   - Lipid Profile (Chol, Trig, HDL, LDL calc) - needs to come back for this fasting    Encounter for screening mammogram for breast cancer  - *MA Screening Digital Bilateral    High priority for 2019-nCoV vaccine  - COVID-19,PF,PFIZER (12+ Yrs PURPLE LABEL)      Return in about 4 months (around 3/11/2022) for Routine preventive.    Avril Hernandez MD     -------------------------------------------    Chief concern:     Ankle pain - RESOLVED . When I saw Remedios 4/23/21 she had had recent onset of ankle pain.  XR, lyme test, autoimmune work up, uric acid all normal.  She says this resolved on its own and she is feeling fine now    Chronic pain/cervical neuralgia  Pain is controlled on pregabalin alone    ADHD  She continues to take Concerta which works well.  While she is retired, she finds this also helps her to better function at home    Insomnia -Well controlled on:  zolpidem 10 mg plus     Disp Refills Start End MITCH   traZODone (DESYREL) 100 MG tablet 90 tablet 3 5/21/2021  No   Sig: TAKE 1 TABLET BY MOUTH AT BEDTIME   Sent to pharmacy as: traZODone 100 mg tablet (DESYREL)   E-Prescribing Status: Receipt confirmed by pharmacy (5/21/2021  9:04 PM CDT)     Reviewed she should have refills on file    Hyperlipidemia  Controlled on atorvastatin 20 mg.  Overdue for fasting lipid panel but not fasting today - OK to come back for this    Component      Latest Ref Rng & Units 8/27/2020   Cholesterol      <=199 mg/dL 183   Triglycerides      <=149 mg/dL 104   HDL Cholesterol      >=50 mg/dL 67   LDL Cholesterol Calculated      <=129 mg/dL 95   Patient Fasting > 8hrs?       Yes     Heart Failure  Last visit with cardiology 4/29/20:    Assessment/Plan:  1.  Calcified coronary atherosclerosis: The patient had no chest pain or shortness of breath.  She had a coronary CT angiogram in 2016 which was reported minimal coronary artery disease.  Continue risk of factor control.     2.  Chronic diastolic congestive heart failure: The patient is compensated well.  No signs of fluid retention.  Continue Lasix 20 mg as needed.     3.  Hyperlipidemia: Continue Lipitor 20 mg at bedtime.  Her LDL was controlled.     4.  Minimal carotid artery stenosis: Observe it.     Follow Up Plan: 12 months    Here is Echocardiogram form 8/10/2016    Summary  Normal left ventricular size and systolic function.  Left ventricular ejection fraction is visually estimated to be 55-60%.  E/A flow reversal noted. Suggestive of diastolic dysfunction.  Myxomatous degeneration of mitral valve.  Bileaflet mitral valve prolapse  Moderate mitral regurgitation.Multiple jets observed      ---  Remedios has not chest pain, dyspnea, orthopnea and has really not had to use lasix at all. She continue to take atorvastatin and carvedilol. She goes 6 miles a week on her [employer]      She would like to discontinue seeing cardiology unless  she has any concerning symptoms , and she knows about these    ----    IN GENERAL    She is well. She had to go back and help Dad - [place] - in May for 3.5 weeks. Then she got Covid. She  just got back from recent visit a week and a half    Remedios continues to be amazed at her love for their puppy.  But she is quite worried about him because he has had bloody stools and has intermittent GI distress. Jina wonders about doing a biopsy, but he is a very small dog. She wants to go with the initial plan to give him B12 injections      Current Outpatient Medications   Medication     atorvastatin (LIPITOR) 20 MG tablet     Carvedilol (COREG PO)     cholecalciferol 25 MCG (1000 UT) TABS     CONCERTA 54 MG CR tablet     [START ON 11/22/2021] Pregabalin (LYRICA) 200 MG capsule     traZODone (DESYREL) 100 MG tablet     zolpidem (AMBIEN) 10 MG tablet     No current facility-administered medications for this visit.         Health Maintenance Due   Topic Date Due     DEXA  Never done     ANNUAL REVIEW OF HM ORDERS  Never done     ZOSTER IMMUNIZATION (1 of 2) Never done     MAMMO SCREENING  02/15/2020     PAP FOLLOW-UP  07/02/2021     LIPID  08/27/2021     Health Maintenance reviewed - declines pap smear today.    The history section was last reviewed by Areli Fernandez LPN on Nov 11, 2021.    History   Smoking Status     Never Smoker   Smokeless Tobacco     Never Used       Social History    Substance and Sexual Activity      Alcohol use: No        Alcohol/week: 0.0 standard drinks        Comment: Alcoholic Drinks/day: stop drinking in OCt 2004        /80 (BP Location: Left arm, Patient Position: Sitting, Cuff Size: Adult Regular)   Pulse 88   Wt 49 kg (108 lb)   SpO2 98%   BMI 19.08 kg/m    Body mass index is 19.08 kg/m .     EXAM:    General appearance - alert, well appearing, and in no distress  Mental status - normal mood, behavior, speech, dress, motor activity, and thought processes  Chest - clear to  auscultation, no wheezes, rales or rhonchi, symmetric air entry  Heart - normal rate, regular rhythm, normal S1, S2, no murmurs, rubs, clicks or gallops

## 2021-11-15 LAB
Lab: NORMAL
PERFORMING LABORATORY: NORMAL
SPECIMEN STATUS: NORMAL
TEST NAME: NORMAL

## 2021-11-23 LAB — MISCELLANEOUS TEST 1 (ARUP): NORMAL

## 2021-12-05 ENCOUNTER — MYC REFILL (OUTPATIENT)
Dept: FAMILY MEDICINE | Facility: CLINIC | Age: 66
End: 2021-12-05
Payer: COMMERCIAL

## 2021-12-05 DIAGNOSIS — F90.0 ATTENTION DEFICIT HYPERACTIVITY DISORDER (ADHD), PREDOMINANTLY INATTENTIVE TYPE: ICD-10-CM

## 2021-12-07 RX ORDER — METHYLPHENIDATE HYDROCHLORIDE 54 MG/1
54 TABLET, EXTENDED RELEASE ORAL EVERY MORNING
Qty: 30 TABLET | Refills: 0 | Status: SHIPPED | OUTPATIENT
Start: 2021-12-07 | End: 2022-01-04

## 2021-12-07 NOTE — TELEPHONE ENCOUNTER
Routing refill request to provider for review/approval because:  Controlled substance request    Last Written Prescription Date:  11/5/21  Last Fill Quantity: 30,  # refills: 0   Last office visit provider:  11/11/21     Requested Prescriptions   Pending Prescriptions Disp Refills     CONCERTA 54 MG CR tablet 30 tablet 0     Sig: Take 1 tablet (54 mg) by mouth every morning BRAND NAME CONCERTA as covered by insruance- not generic       There is no refill protocol information for this order          Immanuel Villaseñor RN 12/07/21 10:43 AM

## 2021-12-09 ENCOUNTER — PATIENT OUTREACH (OUTPATIENT)
Dept: FAMILY MEDICINE | Facility: CLINIC | Age: 66
End: 2021-12-09
Payer: COMMERCIAL

## 2021-12-09 DIAGNOSIS — R87.612 LGSIL ON PAP SMEAR OF CERVIX: ICD-10-CM

## 2021-12-09 NOTE — TELEPHONE ENCOUNTER
Hi Dr Hernandez,    This patient is due for a pap smear following an unsatisfactory pap done in March. She had a visit on 11/11 but a pap was not done. Our reminders have been sent and completed per our pap process. Please review her history below and advise next steps. Thanks!    3/2/21 Unsatisfactory pap, neg HPV. Plan: pap in 2-4 months, Mychart results read  6/2/21 Reminder mychart -- read  6/30/21 Reminder call -- left message  7/2/21 Per provider, pt plans office visit for September 9/23/21 Appt -- cancelled  10/14/21 Appt -- cancelled  11/11/21 Appt -- pap not done  12/9/21 Message sent to provider    Allie Garcia RN BSN, Pap Tracking

## 2021-12-13 NOTE — TELEPHONE ENCOUNTER
"Copied note from provider:    \"  Avril Hernandez MD  You 1 minute ago (2:38 PM)     MW    She declined a pap that day - reminded to come back for this    Message text    \"  "

## 2021-12-13 NOTE — TELEPHONE ENCOUNTER
As we have completed our reminders per our pap process and she was reminded again at her recent visit, this patient is considered lost to pap tracking follow up. Any additional reminders may be sent as desired by clinic staff, and we will review her chart again as needed when she returns for her pap screening in the future.    Allie Garcia RN BSN, Pap Tracking

## 2021-12-16 ENCOUNTER — MYC MEDICAL ADVICE (OUTPATIENT)
Dept: FAMILY MEDICINE | Facility: CLINIC | Age: 66
End: 2021-12-16
Payer: COMMERCIAL

## 2021-12-21 ENCOUNTER — ANCILLARY PROCEDURE (OUTPATIENT)
Dept: MAMMOGRAPHY | Facility: CLINIC | Age: 66
End: 2021-12-21
Attending: FAMILY MEDICINE
Payer: COMMERCIAL

## 2021-12-21 DIAGNOSIS — Z12.31 ENCOUNTER FOR SCREENING MAMMOGRAM FOR BREAST CANCER: ICD-10-CM

## 2021-12-21 PROCEDURE — 77067 SCR MAMMO BI INCL CAD: CPT | Mod: TC | Performed by: RADIOLOGY

## 2021-12-28 DIAGNOSIS — F51.01 PRIMARY INSOMNIA: ICD-10-CM

## 2021-12-30 ENCOUNTER — MYC REFILL (OUTPATIENT)
Dept: FAMILY MEDICINE | Facility: CLINIC | Age: 66
End: 2021-12-30
Payer: COMMERCIAL

## 2021-12-30 DIAGNOSIS — F51.01 PRIMARY INSOMNIA: ICD-10-CM

## 2021-12-30 RX ORDER — ATORVASTATIN CALCIUM 20 MG/1
TABLET, FILM COATED ORAL
Qty: 90 TABLET | OUTPATIENT
Start: 2021-12-30

## 2021-12-30 RX ORDER — CARVEDILOL 25 MG/1
TABLET ORAL
Qty: 90 TABLET | OUTPATIENT
Start: 2021-12-30

## 2021-12-30 RX ORDER — ZOLPIDEM TARTRATE 10 MG/1
TABLET ORAL
Qty: 90 TABLET | Refills: 0 | Status: SHIPPED | OUTPATIENT
Start: 2021-12-30 | End: 2022-03-29

## 2021-12-31 NOTE — TELEPHONE ENCOUNTER
Disp Refills Start End MITCH   carvediloL (COREG) 25 MG tablet 90 tablet 3 3/27/2021  No   Sig: TAKE 1 TABLET(25 MG) BY MOUTH AT BEDTIME   Sent to pharmacy as: carvediloL 25 mg tablet (COREG)   E-Prescribing Status: Receipt confirmed by pharmacy (3/27/2021  5:07 AM CDT)      Disp Refills Start End MITCH   atorvastatin (LIPITOR) 20 MG tablet 90 tablet 3 4/1/2021  No   Sig: TAKE 1 TABLET(20 MG) BY MOUTH DAILY   Sent to pharmacy as: atorvastatin 20 mg tablet (LIPITOR)   E-Prescribing Status: Receipt confirmed by pharmacy (4/1/2021 11:30 AM CDT)     Above refused - she should be good through March    Refilled zolpidem

## 2021-12-31 NOTE — TELEPHONE ENCOUNTER
"Routing refill request to provider for review/approval because:  Labs not current:  LDL  Controlled substance request    Last Written Prescription Date:  10/5/21  Last Fill Quantity: 90,  # refills: 0   Last office visit provider:  11/11/21     Requested Prescriptions   Pending Prescriptions Disp Refills     atorvastatin (LIPITOR) 20 MG tablet [Pharmacy Med Name: ATORVASTATIN 20MG TABLETS] 90 tablet      Sig: TAKE 1 TABLET(20 MG) BY MOUTH DAILY       Statins Protocol Failed - 12/28/2021  9:46 PM        Failed - LDL on file in past 12 months     Recent Labs   Lab Test 08/27/20  1016   LDL 95             Passed - No abnormal creatine kinase in past 12 months     No lab results found.             Passed - Recent (12 mo) or future (30 days) visit within the authorizing provider's specialty     Patient has had an office visit with the authorizing provider or a provider within the authorizing providers department within the previous 12 mos or has a future within next 30 days. See \"Patient Info\" tab in inbasket, or \"Choose Columns\" in Meds & Orders section of the refill encounter.              Passed - Medication is active on med list        Passed - Patient is age 18 or older        Passed - No active pregnancy on record        Passed - No positive pregnancy test in past 12 months           carvedilol (COREG) 25 MG tablet [Pharmacy Med Name: CARVEDILOL 25MG TABLETS] 90 tablet      Sig: TAKE 1 TABLET(25 MG) BY MOUTH AT BEDTIME       Beta-Blockers Protocol Passed - 12/28/2021  9:46 PM        Passed - Blood pressure under 140/90 in past 12 months     BP Readings from Last 3 Encounters:   11/11/21 128/80   04/23/21 130/80   03/02/21 138/76                 Passed - Patient is age 6 or older        Passed - Recent (12 mo) or future (30 days) visit within the authorizing provider's specialty     Patient has had an office visit with the authorizing provider or a provider within the authorizing providers department within the " "previous 12 mos or has a future within next 30 days. See \"Patient Info\" tab in inbasket, or \"Choose Columns\" in Meds & Orders section of the refill encounter.              Passed - Medication is active on med list           zolpidem (AMBIEN) 10 MG tablet [Pharmacy Med Name: ZOLPIDEM 10MG TABLETS] 90 tablet      Sig: TAKE 1 TABLET(10 MG) BY MOUTH AT BEDTIME       There is no refill protocol information for this order          patti segundo RN 12/30/21 6:30 PM  "

## 2022-01-01 RX ORDER — ZOLPIDEM TARTRATE 10 MG/1
TABLET ORAL
Qty: 90 TABLET | Refills: 0 | OUTPATIENT
Start: 2022-01-01

## 2022-01-04 ENCOUNTER — MYC REFILL (OUTPATIENT)
Dept: FAMILY MEDICINE | Facility: CLINIC | Age: 67
End: 2022-01-04

## 2022-01-04 DIAGNOSIS — F90.0 ATTENTION DEFICIT HYPERACTIVITY DISORDER (ADHD), PREDOMINANTLY INATTENTIVE TYPE: ICD-10-CM

## 2022-01-06 ENCOUNTER — MYC REFILL (OUTPATIENT)
Dept: FAMILY MEDICINE | Facility: CLINIC | Age: 67
End: 2022-01-06
Payer: COMMERCIAL

## 2022-01-06 DIAGNOSIS — F90.0 ATTENTION DEFICIT HYPERACTIVITY DISORDER (ADHD), PREDOMINANTLY INATTENTIVE TYPE: ICD-10-CM

## 2022-01-06 RX ORDER — METHYLPHENIDATE HYDROCHLORIDE 54 MG/1
54 TABLET, EXTENDED RELEASE ORAL EVERY MORNING
Qty: 30 TABLET | Refills: 0 | Status: CANCELLED | OUTPATIENT
Start: 2022-01-06

## 2022-01-07 RX ORDER — METHYLPHENIDATE HYDROCHLORIDE 54 MG/1
54 TABLET, EXTENDED RELEASE ORAL EVERY MORNING
Qty: 30 TABLET | Refills: 0 | Status: SHIPPED | OUTPATIENT
Start: 2022-01-07 | End: 2022-02-02

## 2022-01-07 NOTE — TELEPHONE ENCOUNTER
12/31/2021  1   12/30/2021  Zolpidem Tartrate 10 MG Tablet    90.00  90 Ma Win   [id number]   [facility] ([id number]   0/[place]  0.50 [place]   12/11/2021  1   11/11/2021  Pregabalin 200 MG Capsule    90.00  30 Ma Win   [id number]   [facility] ([facility]   1/5  4.02 [place]   12/07/2021  1   12/07/2021  Concerta Er 54 MG Tablet    30.00  30 Da Lar   [id number]   [facility]   [place]   11/12/2021  1   11/11/2021  Pregabalin 200 MG Capsule    90.00  30 Ma Win   [id number]   [facility] ([facility]   0/5  4.02 [place]   11/06/2021  1   11/05/2021  Concerta Er 54 MG Tablet    30.00  30 Ma Win   [id number]   [facility]   [place]   10/22/2021  1   10/21/2021  Pregabalin 200 MG Capsule    90.00  30 Ma Win   [id number]   [facility] ([facility]   0/[place]  4.02 [place]   10/07/2021  1   10/05/2021  Concerta Er 54 MG Tablet    30.00  30 Ma Win   [id number]   [facility]   [place]   10/05/2021  1   10/05/2021  Zolpidem Tartrate 10 MG Tablet    90.00  90 Ma Win   [id number]   [facility] ([facility]   0/[place]  0.50 [place]   09/23/2021  1   05/26/2021  Pregabalin 200 MG Capsule    90.00  30 Ma Win   [id number]   [facility] ([facility]   4/4  4.02 [place]

## 2022-01-07 NOTE — TELEPHONE ENCOUNTER
Routing refill request to provider for review/approval because:  Drug not on the [facility] refill protocol   CONCERTA 54 MG CR tablet 30 tablet 0 12/7/2021  Yes   Sig - Route: Take 1 tablet (54 mg) by mouth every morning BRAND NAME CONCERTA as covered by insruance- not generic - Oral     LAST OV-11/11/21

## 2022-01-09 RX ORDER — METHYLPHENIDATE HYDROCHLORIDE 54 MG/1
54 TABLET, EXTENDED RELEASE ORAL EVERY MORNING
Qty: 30 TABLET | Refills: 0 | OUTPATIENT
Start: 2022-01-09

## 2022-02-02 ENCOUNTER — MYC REFILL (OUTPATIENT)
Dept: FAMILY MEDICINE | Facility: CLINIC | Age: 67
End: 2022-02-02
Payer: COMMERCIAL

## 2022-02-02 DIAGNOSIS — F90.0 ATTENTION DEFICIT HYPERACTIVITY DISORDER (ADHD), PREDOMINANTLY INATTENTIVE TYPE: ICD-10-CM

## 2022-02-02 DIAGNOSIS — Z79.899 CONTROLLED SUBSTANCE AGREEMENT SIGNED: ICD-10-CM

## 2022-02-04 PROBLEM — Z79.899 CONTROLLED SUBSTANCE AGREEMENT SIGNED: Status: ACTIVE | Noted: 2022-02-04

## 2022-02-04 RX ORDER — METHYLPHENIDATE HYDROCHLORIDE 54 MG/1
54 TABLET, EXTENDED RELEASE ORAL EVERY MORNING
Qty: 30 TABLET | Refills: 0 | Status: SHIPPED | OUTPATIENT
Start: 2022-02-04 | End: 2022-03-03

## 2022-02-04 NOTE — TELEPHONE ENCOUNTER
Diagnosis: 1) ADHD 2) insomnia 3) chronic pain  Medication : 1) Methylphenidate 54 mg CR 2) zolpidem 10 mg 3 pregabalin 200 mg tid  Quantity per month: 1) 30) 30 3) 90  Number of refills before follow up visit: 6 months - may be by lissett SALAMANCA 5/4/21  Urine drug screen 11/11/21 01/09/2022  1   11/11/2021  Pregabalin 200 MG Capsule    90.00  30 Ma Win   [id number]   [facility]   2/5  4.02 [place]   01/07/2022  1   01/07/2022  Concerta Er 54 MG Tablet    30.00  30 Ma Win   [id number]   [facility]   [place]   12/31/2021  1   12/30/2021  Zolpidem Tartrate 10 MG Tablet    90.00  90 Ma Win   [id number]   [facility] ([facility]   0/0  0.50 [place]   12/11/2021  1   11/11/2021  Pregabalin 200 MG Capsule    90.00  30 Ma Win   [id number]   [facility] ([facility]605[facility]   1/5  4.02 [place]   12/07/2021  1   12/07/2021  Concerta Er 54 MG Tablet    30.00  30 Da Lar   [id number]   [facility]   [place]   11/12/2021  1   11/11/2021  Pregabalin 200 MG Capsule    90.00  30 Ma Win   [id number]   [facility] ([facility]605)   0/5  4.02 LME  Comm Ins

## 2022-02-04 NOTE — TELEPHONE ENCOUNTER
Routing refill request to provider for review/approval because:  Drug not on the [facility] refill protocol   CONCERTA 54 MG CR tablet 30 tablet 0 1/7/2022  Yes   Sig - Route: Take 1 tablet (54 mg) by mouth every morning BRAND NAME CONCERTA as covered by insruance- not generic - Oral     LAST OV-11/11/21

## 2022-03-03 ENCOUNTER — MYC REFILL (OUTPATIENT)
Dept: FAMILY MEDICINE | Facility: CLINIC | Age: 67
End: 2022-03-03
Payer: COMMERCIAL

## 2022-03-03 DIAGNOSIS — F90.0 ATTENTION DEFICIT HYPERACTIVITY DISORDER (ADHD), PREDOMINANTLY INATTENTIVE TYPE: ICD-10-CM

## 2022-03-04 RX ORDER — METHYLPHENIDATE HYDROCHLORIDE 54 MG/1
54 TABLET, EXTENDED RELEASE ORAL EVERY MORNING
Qty: 30 TABLET | Refills: 0 | Status: SHIPPED | OUTPATIENT
Start: 2022-03-06 | End: 2022-03-29

## 2022-03-04 NOTE — TELEPHONE ENCOUNTER
Medication: Concerta CR 54 mg      CSA in last year: YES    Random Utox in last year: YES    PROVIDER TO PULL THE FOLLOWING FROM  :    1. Last date filled?  2.   Only PCP Prescribing?  3.   Taken as prescribed from physician notes?

## 2022-03-04 NOTE — TELEPHONE ENCOUNTER
Routing refill request to provider for review/approval because:  Drug not on the [facility] refill protocol-controlled substance refill    Last Written Prescription Date:  2/4/22  Last Fill Quantity: 30,  # refills: 0   Last office visit provider:  11/11/21     Requested Prescriptions   Pending Prescriptions Disp Refills     CONCERTA 54 MG CR tablet 30 tablet 0     Sig: Take 1 tablet (54 mg) by mouth every morning BRAND NAME CONCERTA as covered by insruance- not generic       There is no refill protocol information for this order          Jigna Ro 03/04/22 10:43 AM

## 2022-03-05 NOTE — TELEPHONE ENCOUNTER
Fill Date ID   Written Drug Qty Days Prescriber Rx # Pharmacy Refill   Daily Dose* Pymt Type      02/07/2022  1   02/04/2022  Concerta Er 54 MG Tablet    30.00  30 Ma Win   [id number]   [id number]   [place]   02/06/2022  1   11/11/2021  Pregabalin 200 MG Capsule    90.00  30 Ma Win   [id number]   [facility] ([facility]   3/5  4.02 [place]   01/09/2022  1   11/11/2021  Pregabalin 200 MG Capsule    90.00  30 Ma Win   [id number]   [facility] ([facility]   2/5  4.02 [place]   01/07/2022  1   01/07/2022  Concerta Er 54 MG Tablet    30.00  30 Ma Win   [id number]   [facility]   [place]   [place]

## 2022-03-26 ASSESSMENT — ACTIVITIES OF DAILY LIVING (ADL): CURRENT_FUNCTION: NO ASSISTANCE NEEDED

## 2022-03-29 ENCOUNTER — OFFICE VISIT (OUTPATIENT)
Dept: FAMILY MEDICINE | Facility: CLINIC | Age: 67
End: 2022-03-29
Payer: COMMERCIAL

## 2022-03-29 VITALS
HEART RATE: 78 BPM | HEIGHT: 64 IN | BODY MASS INDEX: 18.56 KG/M2 | SYSTOLIC BLOOD PRESSURE: 128 MMHG | WEIGHT: 108.7 LBS | DIASTOLIC BLOOD PRESSURE: 66 MMHG | OXYGEN SATURATION: 98 %

## 2022-03-29 DIAGNOSIS — Z00.00 WELCOME TO MEDICARE PREVENTIVE VISIT: Primary | ICD-10-CM

## 2022-03-29 DIAGNOSIS — F90.0 ATTENTION DEFICIT HYPERACTIVITY DISORDER (ADHD), PREDOMINANTLY INATTENTIVE TYPE: ICD-10-CM

## 2022-03-29 DIAGNOSIS — F51.01 PRIMARY INSOMNIA: ICD-10-CM

## 2022-03-29 DIAGNOSIS — Z12.4 CERVICAL CANCER SCREENING: ICD-10-CM

## 2022-03-29 DIAGNOSIS — I50.32 CHRONIC DIASTOLIC CHF (CONGESTIVE HEART FAILURE), NYHA CLASS 2 (H): ICD-10-CM

## 2022-03-29 DIAGNOSIS — E04.1 THYROID NODULE: ICD-10-CM

## 2022-03-29 DIAGNOSIS — Z78.0 POST-MENOPAUSAL: ICD-10-CM

## 2022-03-29 DIAGNOSIS — E78.2 MIXED HYPERLIPIDEMIA: ICD-10-CM

## 2022-03-29 PROCEDURE — 93005 ELECTROCARDIOGRAM TRACING: CPT | Performed by: FAMILY MEDICINE

## 2022-03-29 PROCEDURE — G0123 SCREEN CERV/VAG THIN LAYER: HCPCS | Performed by: FAMILY MEDICINE

## 2022-03-29 PROCEDURE — 87624 HPV HI-RISK TYP POOLED RSLT: CPT | Performed by: FAMILY MEDICINE

## 2022-03-29 PROCEDURE — 99397 PER PM REEVAL EST PAT 65+ YR: CPT | Performed by: FAMILY MEDICINE

## 2022-03-29 RX ORDER — CARVEDILOL 25 MG/1
25 TABLET ORAL DAILY
Qty: 90 TABLET | Refills: 3 | Status: SHIPPED | OUTPATIENT
Start: 2022-03-29 | End: 2023-03-20

## 2022-03-29 RX ORDER — METHYLPHENIDATE HYDROCHLORIDE 54 MG/1
54 TABLET, EXTENDED RELEASE ORAL EVERY MORNING
Qty: 30 TABLET | Refills: 0 | Status: SHIPPED | OUTPATIENT
Start: 2022-04-05 | End: 2022-05-02

## 2022-03-29 RX ORDER — ZOLPIDEM TARTRATE 10 MG/1
TABLET ORAL
Qty: 90 TABLET | Refills: 1 | Status: SHIPPED | OUTPATIENT
Start: 2022-03-29 | End: 2022-09-28

## 2022-03-29 RX ORDER — ATORVASTATIN CALCIUM 20 MG/1
20 TABLET, FILM COATED ORAL DAILY
Qty: 90 TABLET | Refills: 0 | Status: SHIPPED | OUTPATIENT
Start: 2022-03-29 | End: 2022-06-24

## 2022-03-29 ASSESSMENT — ACTIVITIES OF DAILY LIVING (ADL): CURRENT_FUNCTION: NO ASSISTANCE NEEDED

## 2022-03-29 NOTE — PATIENT INSTRUCTIONS
Patient Education   Personalized Prevention Plan  You are due for the preventive services outlined below.  Your care team is available to assist you in scheduling these services.  If you have already completed any of these items, please share that information with your care team to update in your medical record.  Health Maintenance Due   Topic Date Due     Osteoporosis Screening  Never done     Zoster (Shingles) Vaccine (1 of 2) Never done     PAP  07/02/2021     Cholesterol Lab  08/27/2021     Liver Monitoring Lab  04/23/2022     Basic Metabolic Panel  04/23/2022     Complete Blood Count  04/23/2022       Understanding USDA MyPlate  The USDA has guidelines to help you make healthy food choices. These are called MyPlate. MyPlate shows the food groups that make up healthy meals using the image of a place setting. Before you eat, think about the healthiest choices for what to put on your plate or in your cup or bowl. To learn more about building a healthy plate, visit www.choosemyplate.gov.    The food groups    Fruits. Any fruit or 100% fruit juice counts as part of the Fruit Group. Fruits may be fresh, canned, frozen, or dried, and may be whole, cut-up, or pureed. Make 1/2 of your plate fruits and vegetables.    Vegetables. Any vegetable or 100% vegetable juice counts as a member of the Vegetable Group. Vegetables may be fresh, frozen, canned, or dried. They can be served raw or cooked and may be whole, cut-up, or mashed. Make 1/2 of your plate fruits and vegetables.    Grains. All foods made from grains are part of the Grains Group. These include wheat, rice, oats, cornmeal, and barley. Grains are often used to make foods such as bread, pasta, oatmeal, cereal, tortillas, and grits. Grains should be no more than 1/4 of your plate. At least half of your grains should be whole grains.    Protein. This group includes meat, poultry, seafood, beans and peas, eggs, processed soy products (such as tofu), nuts (including  nut butters), and seeds. Make protein choices no more than 1/4 of your plate. Meat and poultry choices should be lean or low fat.    Dairy. The Dairy Group includes all fluid milk products and foods made from milk that contain calcium, such as yogurt and cheese. (Foods that have little calcium, such as cream, butter, and cream cheese, are not part of this group.) Most dairy choices should be low-fat or fat-free.    Oils. Oils aren't a food group, but they do contain essential nutrients. However it's important to watch your intake of oils. These are fats that are liquid at room temperature. They include canola, corn, olive, soybean, vegetable, and sunflower oil. Foods that are mainly oil include mayonnaise, certain salad dressings, and soft margarines. You likely already get your daily oil allowance from the foods you eat.  Things to limit  Eating healthy also means limiting these things in your diet:       Salt (sodium). Many processed foods have a lot of sodium. To keep sodium intake down, eat fresh vegetables, meats, poultry, and seafood when possible. Purchase low-sodium, reduced-sodium, or no-salt-added food products at the store. And don't add salt to your meals at home. Instead, season them with herbs and spices such as dill, oregano, cumin, and paprika. Or try adding flavor with lemon or lime zest and juice.    Saturated fat. Saturated fats are most often found in animal products such as beef, pork, and chicken. They are often solid at room temperature, such as butter. To reduce your saturated fat intake, choose leaner cuts of meat and poultry. And try healthier cooking methods such as grilling, broiling, roasting, or baking. For a simple lower-fat swap, use plain nonfat yogurt instead of mayonnaise when making potato salad or macaroni salad.    Added sugars. These are sugars added to foods. They are in foods such as ice cream, candy, soda, fruit drinks, sports drinks, energy drinks, cookies, pastries, jams,  and syrups. Cut down on added sugars by sharing sweet treats with a family member or friend. You can also choose fruit for dessert, and drink water or other unsweetened beverages.     [employer] last reviewed this educational content on 6/1/2020 2000-2021 [employer]. All rights reserved. This information is not intended as a substitute for professional medical care. Always follow your healthcare professional's instructions.

## 2022-03-29 NOTE — PROGRESS NOTES
"      ASSESSMENT / PLAN:   Cindy was seen today for wellness visit.    Diagnoses and all orders for this visit:    Welcome to Medicare preventive visit  -     EKG 12-lead, tracing only    Thyroid nodule  -     US Thyroid; Future    Primary insomnia  -     zolpidem (AMBIEN) 10 MG tablet; TAKE 1 TABLET(10 MG) BY MOUTH AT BEDTIME    Attention deficit hyperactivity disorder (ADHD), predominantly inattentive type  -     CONCERTA 54 MG CR tablet; Take 1 tablet (54 mg) by mouth every morning BRAND NAME CONCERTA as covered by insurance- not generic    Chronic diastolic CHF (congestive heart failure), NYHA class 2 (H)  -     Comprehensive metabolic panel (BMP + Alb, Alk Phos, ALT, AST, Total. Bili, TP); Future  -     carvedilol (COREG) 25 MG tablet; Take 1 tablet (25 mg) by mouth daily    Mixed hyperlipidemia  -     atorvastatin (LIPITOR) 20 MG tablet; Take 1 tablet (20 mg) by mouth daily    Post-menopausal  -     DX Hip/Pelvis/Spine; Future    Cervical cancer screening - last one!  -     Gynecologic Cytology (PAP Smear); Future  -     HPV High Risk Types DNA Cervical    Other orders  -     REVIEW OF HEALTH MAINTENANCE PROTOCOL ORDERS        Estimated body mass index is 18.95 kg/m  as calculated from the following:    Height as of this encounter: 1.613 m (5' 3.5\").    Weight as of this encounter: 49.3 kg (108 lb 11.2 oz).    Weight management plan noted, stable and monitoring    She reports that she has never smoked. She has never used smokeless tobacco.      Appropriate preventive services were discussed with this patient, including applicable screening as appropriate for cardiovascular disease, diabetes, osteopenia/osteoporosis, and glaucoma.  As appropriate for age/gender, discussed screening for colorectal cancer, prostate cancer, breast cancer, and cervical cancer. Checklist reviewing preventive services available has been given to the patient.    Reviewed patients plan of care and provided an AVS. The Basic Care " "Plan (routine screening as documented in Health Maintenance) for Cindy meets the Care Plan requirement. This Care Plan has been established and reviewed with the Patient.    Avril Hernandez MD  [facility]    Identified Health Risks:    The patient was counseled and encouraged to consider modifying their diet and eating habits. She was provided with information on recommended healthy diet options.    SUBJECTIVE:   Cindy Beltrán is a 66 year old female who presents for Preventive Visit.      Patient has been advised of split billing requirements and indicates understanding: Yes  Are you in the first 12 months of your Medicare coverage?  Yes,  Patient has seen eye doctor within the past 6 months, eye health/vision checked routinely    Healthy Habits:     In general, how would you rate your overall health?  Excellent    Frequency of exercise:  6-7 days/week    Duration of exercise:  Greater than 60 minutes    Do you usually eat at least 4 servings of fruit and vegetables a day, include whole grains    & fiber and avoid regularly eating high fat or \"junk\" foods?  No    Taking medications regularly:  Yes    Barriers to taking medications:  None    Medication side effects:  None    Ability to successfully perform activities of daily living:  No assistance needed    Home Safety:  No safety concerns identified    Hearing Impairment:  No hearing concerns    In the past 6 months, have you been bothered by leaking of urine?  No    In general, how would you rate your overall mental or emotional health?  Excellent      PHQ-2 Total Score: 0    Additional concerns today:  No    In generally doing well  Now doing 25-30 miles a week on the treadmill weekly - 4 miles a day - sometimes more - Feels like her legs are stronger.  A \"Game changer.\" Continues to love having her dog with her - \"he goes with me everywhere.\"    ADHD -   Well controlled on:    - she does have insomnia, but that was " separate from taking stimulants   - weight stable   - no palpitations   - due for CSA and urine test today    Wt Readings from Last 5 Encounters:   03/29/22 49.3 kg (108 lb 11.2 oz)   11/11/21 49 kg (108 lb)   04/23/21 49.5 kg (109 lb 2 oz)   03/02/21 48.6 kg (107 lb 1.6 oz)   04/29/20 49.9 kg (110 lb)       Hyperlipidemia -   She admits her diet could be better, but  lipids historically well controlled on: atorvastatin 20 mg  - did not fast today, will come back or labs    Idiopathic cardiomyopathy leading to chronic diastolic heart failure  Takes carvedilol 25 m daily She really has never had symptoms since first diagnosis and  - no dyspnea on exertion, paroxsymal nocturnal dyspnea    Chronic pain - low back pain  Well controlled with pregabalin, for which there are no lab monitoring parameters (below from Up-To-Date )     Monitoring Parameters   Measures of efficacy (pain intensity/seizure frequency); degree of sedation; mental alertness; symptoms of respiratory depression and sedation in patients with underlying respiratory disease (FDA 2019); symptoms of myopathy; creatine kinase (as clinically indicated); symptoms of ocular disturbance; weight gain/edema; skin integrity (in patients with diabetes); signs and symptoms of suicidality (eg, suicidal thoughts, anxiety, depression, behavioral changes); platelet count (as clinically indicated)    Insomnia - controlled o trazodone and zolpidem. No lab monitoring necessary fr trazodone  Monitoring Parameters   Baseline liver function prior to and periodically during therapy; closely monitor patients for depression, clinical worsening, suicidality, psychosis, or unusual changes in behavior (eg. anxiety, agitation, panic attacks, insomnia, irritability, hostility, impulsivity, akathisia, hypomania, and mamadou), particularly during the initial 1 to 2 months of therapy or during periods of dosage adjustments (increases or decreases); signs/symptoms of serotonin syndrome  "such as mental status changes (eg, agitation, hallucinations, delirium, coma); autonomic instability (eg, tachycardia, labile BP, diaphoresis); neuromuscular changes (eg, tremor, rigidity, myoclonus); GI symptoms (eg, nausea, vomiting, diarrhea); and/or seizures; signs/symptoms of hypotension or orthostasis.      Joint aches - ankles is better - saw sports medicine    Do you feel safe in your environment? Yes    Have you ever done Advance Care Planning? (For example, a Health Directive, POLST, or a discussion with a medical provider or your loved ones about your wishes): No, advance care planning information given to patient to review.      Social History: -She and her  are driving  To [place] and leaving the morning of the 7th of April to visit her daughter and 4 grand kids. Remedios's daughter is 44. Dad, who is  96, tells Remedios -\"you are a spring chicken\" - he lives alone and has a caretaker - he has a tax business.[place] Remedios's son and his wife are expecting a baby and  Due first of July. Remedios's  is presenting at a conference in [place]    Fall risk  Fallen 2 or more times in the past year?: No  Any fall with injury in the past year?: No    Cognitive Screening   1) Repeat 3 items (Leader, Season, Table)    2) Clock draw: NORMAL  3) 3 item recall: Recalls 3 objects  Results: 3 items recalled: COGNITIVE IMPAIRMENT LESS LIKELY    Mini-CogTM Copyright LISA Drake. Licensed by the author for use in [facility]; reprinted with permission (heidi@.[facility]). All rights reserved.        Reviewed and updated as needed this visit by clinical staff   Tobacco  Allergies  Meds              Reviewed and updated as needed this visit by Provider                 Social History     Tobacco Use     Smoking status: Never Smoker     Smokeless tobacco: Never Used   Substance Use Topics     Alcohol use: No     Alcohol/week: 0.0 standard drinks     Comment: Alcoholic Drinks/day: stop drinking in OCt 2004     If " you drink alcohol do you typically have >3 drinks per day or >7 drinks per week? No    Alcohol Use 3/29/2022   Prescreen: >3 drinks/day or >7 drinks/week? Does not drink at all   Prescreen: >3 drinks/day or >7 drinks/week?    No flowsheet data found.            Current providers sharing in care for this patient include:   Patient Care Team:  Avril Hernandez MD as PCP - Te Garner MD as MD (Orthopedics)  Tripp Clifton MD as Referring Physician (Orthopedics)  Cherelle Maldonado PharmD as Pharmacist (Pharmacist)  Avril Hernandez MD as Assigned PCP    The following health maintenance items are reviewed in Epic and correct as of today:  Health Maintenance Due   Topic Date Due     DEXA  Never done     ZOSTER IMMUNIZATION (1 of 2) Never done     PAP FOLLOW-UP  07/02/2021     LIPID  08/27/2021     ALT  04/23/2022     BMP  04/23/2022     CBC  04/23/2022       FHS-7:   Breast CA Risk Assessment (FHS-7) 12/21/2021 3/26/2022   Did any of your first-degree relatives have breast or ovarian cancer? No Yes - no - not a first degree relative   Did any of your relatives have bilateral breast cancer? No Unknown   Did any man in your family have breast cancer? No No   Did any woman in your family have breast and ovarian cancer? No No   Did any woman in your family have breast cancer before age 50 y? No No   Do you have 2 or more relatives with breast and/or ovarian cancer? No Yes   Do you have 2 or more relatives with breast and/or bowel cancer? No Unknown         Pertinent mammograms are reviewed under the imaging tab.    Review of Systems    Constitutional: negative for recent illness or change in weight  Eyes: negative for irritation ; mil;d vision change - told she has cataracts - she is going to wait non surgery  Ears, nose, mouth, throat, and face: negative for nasal congestion and sore throat  Respiratory: negative for cough and dyspnea on exertion  Cardiovascular: negative  "for chest pain and palpitations  Gastrointestinal: negative for abdominal pain and change in bowel habits  Genitourinary:negative for dysuria, frequency and hesitancy  Integument/breast: negative for rash  Neurological: negative for dizziness, headaches and paresthesia      OBJECTIVE:   /66 (BP Location: Left arm, Patient Position: Sitting, Cuff Size: Adult Regular)   Pulse 78   Ht 1.613 m (5' 3.5\")   Wt 49.3 kg (108 lb 11.2 oz)   SpO2 98%   BMI 18.95 kg/m   Estimated body mass index is 18.95 kg/m  as calculated from the following:    Height as of this encounter: 1.613 m (5' 3.5\").    Weight as of this encounter: 49.3 kg (108 lb 11.2 oz).  Physical Exam  General appearance - alert, well appearing, and in no distress  Mental status - normal mood, behavior, speech, dress, motor activity, and thought processes  Eyes - pupils equal and reactive, extraocular eye movements intact, funduscopic exam normal, discs flat and sharp  Ears - bilateral TM's and external ear canals normal  Mouth - mucous membranes moist, pharynx normal without lesions  Neck - supple, no significant adenopathy, carotids upstroke normal bilaterally, no bruits, thyroid exam: Left lower pole thyroid nodule, otherwise no enlargement or nodules  Chest - clear to auscultation, no wheezes, rales or rhonchi, symmetric air entry  Heart - normal rate, regular rhythm, normal S1, S2, no murmurs, rubs, clicks or gallops  Abdomen - soft, nontender, nondistended, no masses or organomegaly  Breasts - breasts appear normal, no suspicious masses, no skin or nipple changes or axillary nodes  Pelvic exam: normal vagina and vulva, normal cervix without lesions or tenderness, pap smear done.  Neurological - alert, oriented, normal speech, no focal findings or movement disorder noted, DTR's normal and symmetric  Extremities - peripheral pulses normal, no pedal edema, no clubbing or cyanosis  Skin - no rashes or worrisome lesions        "

## 2022-03-29 NOTE — LETTER
[facility]  03/29/22  Patient: Cindy Beltrán  YOB: 1955  Medical Record Number: [id number]                                                                                  Non-Opioid Controlled Substance Agreement    Diagnosis: 1) ADHD 2) insomnia 3) chronic pain  Medication : 1) Methylphenidate 54 mg CR 2) zolpidem 10 mg 3 pregabalin 200 mg tid  Quantity per month: 1) 30) 30 3) 90  Number of refills before follow up visit: 6 months - may be by lissett    This is an agreement between you and your provider regarding safe and appropriate use of controlled substances prescribed by your care team. Controlled substances are?medicines that can cause physical and mental dependence (abuse).     There are strict laws about having and using these medicines. We here at [facility] are  committed to working with you in your efforts to get better. To support you in this work, we'll help you schedule regular office appointments for medicine refills. If we must cancel or change your appointment for any reason, we'll make sure you have enough medicine to last until your next appointment.     As a Provider, I will:     Listen carefully to your concerns while treating you with respect.     Recommend a treatment plan that I believe is in your best interest and may involve therapies other than medicine.      Talk with you often about the possible benefits and the risk of harm of any medicine that we prescribe for you.    Assess the safety of this medicine and check how well it works.      Provide a plan on how to taper (discontinue or go off) using this medicine if the decision is made to stop its use.      ::  As a Patient, I understand controlled substances:       Are prescribed by my care provider to help me function or work and manage my condition(s).?    Are strong medicines and can cause serious side effects.       Need to be taken exactly as prescribed.?Combining controlled  substances with certain medicines or chemicals (such as illegal drugs, alcohol, sedatives, sleeping pills, and benzodiazepines) can be dangerous or even fatal.? If I stop taking my medicines suddenly, I may have severe withdrawal symptoms.     The risks, benefits, and side effects of these medicine(s) were explained to me. I agree that:    1. I will take part in other treatments as advised by my care team. This may be psychiatry or counseling, physical therapy, behavioral therapy, group treatment or a referral to specialist.  2. I will keep all my appointments and understand this is part of the monitoring of controlled substances.?My care team may require an office visit for EVERY controlled substance refill. If I miss appointments or don t follow instructions, my care team may stop my medicine    3. I will take my medicines as prescribed. I will not change the dose or schedule unless my care team tells me to. There will be no refills if I run out early.    4. I may be asked to come to the clinic and complete a urine drug test or complete a pill count. If I don t give a urine sample or participate in a pill count, the care team may stop my medicine.  5. I will only receive controlled substance prescriptions from this clinic. If I am treated by another provider, I will tell them that I am taking controlled substances and that I have a treatment agreement with this provider. I will inform my [facility] care team within one business day if I am given a prescription for any controlled substance by another healthcare provider. My [facility] care team can contact other providers and pharmacists about my use of any medicines.  6. It is up to me to make sure that I don't run out of my medicines on weekends or holidays.?If my care team is willing to refill my prescription without a visit, I must request refills only during office hours. Refills may take up to 3 business days to process. I will use one pharmacy  to fill all my controlled substance prescriptions. I will notify the clinic about any changes to my insurance or medicine availability.  7. I am responsible for my prescriptions. If the medicine/prescription is lost, stolen or destroyed, it will not be replaced.?I also agree not to share controlled substance medicines with anyone.   8. I am aware I should not use any illegal or recreational drugs. I agree not to drink alcohol unless my care team says I can.   9. If I enroll in the [place] Medical Cannabis program, I will tell my care team before my next refill.  10. I will tell my care team right away if I become pregnant, have a new medical problem treated outside of my regular clinic, or have a change in my medicines.   11. I understand that this medicine can affect my thinking, judgment and reaction time.? Alcohol and drugs affect the brain and body, which can affect the safety of my driving. Being under the influence of alcohol or drugs can affect my decision-making, behaviors, personal safety and the safety of others. Driving while impaired (DWI) can occur if a person is driving, operating or in physical control of a car, motorcycle, boat, snowmobile, ATV, motorbike, off-road vehicle or any other motor vehicle ([place] Statute 169A.20). I understand the risk if I choose to drive or operate any vehicle or machinery.    I understand that if I do not follow any of the conditions above, my prescriptions or treatment may be stopped or changed.   I agree that my provider, clinic care team and pharmacy may work with any city, state or federal law enforcement agency that investigates the misuse, sale or other diversion of my controlled medicine. I will allow my provider to discuss my care with, or share a copy of, this agreement with any other treating provider, pharmacy or emergency room where I receive care.     I have read this agreement and have asked questions about anything I did not  understand.    ________________________________________________________  Patient Signature - Cindy M Jerel     ___________________                   Date     ________________________________________________________  Provider Signature - Roopa Ebony, MD       ___________________                   Date     ________________________________________________________  Witness Signature (required if provider not present while patient signing)          ___________________                   Date

## 2022-03-31 LAB
HUMAN PAPILLOMA VIRUS 16 DNA: NEGATIVE
HUMAN PAPILLOMA VIRUS 18 DNA: NEGATIVE
HUMAN PAPILLOMA VIRUS FINAL DIAGNOSIS: NORMAL
HUMAN PAPILLOMA VIRUS OTHER HR: NEGATIVE

## 2022-04-01 ENCOUNTER — HOSPITAL ENCOUNTER (OUTPATIENT)
Dept: ULTRASOUND IMAGING | Facility: CLINIC | Age: 67
Discharge: HOME | End: 2022-04-01
Attending: FAMILY MEDICINE | Admitting: FAMILY MEDICINE
Payer: COMMERCIAL

## 2022-04-01 DIAGNOSIS — E04.1 THYROID NODULE: ICD-10-CM

## 2022-04-01 PROCEDURE — 76536 US EXAM OF HEAD AND NECK: CPT

## 2022-04-05 ENCOUNTER — TELEPHONE (OUTPATIENT)
Dept: FAMILY MEDICINE | Facility: CLINIC | Age: 67
End: 2022-04-05

## 2022-04-05 NOTE — TELEPHONE ENCOUNTER
Reason for Call:  Other call back    Detailed comments: Pharmacy calling to clarify which day they are to fill the Rx  One say:  Do not dispense until 04/05/2022  The other says:  Ok to fill on 04/06/2022    Phone Number Patient can be reached at: Other phone number:  [phone]    Best Time: anytime    Can we leave a detailed message on this number? YES    Call taken on 4/5/2022 at 8:52 AM by Skylar Villegas

## 2022-04-05 NOTE — TELEPHONE ENCOUNTER
Review of chart shows pcp sent in script at appt on 3/29/22.  Last fill date 3/6/22.      Newest script has fill date of both 4/5/22, and vacation override date of 4/6/22.  Routing to covering provider to give okay to jarvis today vs tomorrow.

## 2022-04-06 LAB
BKR LAB AP GYN ADEQUACY: NORMAL
BKR LAB AP GYN INTERPRETATION: NORMAL
BKR LAB AP HPV REFLEX: NORMAL
BKR LAB AP PREVIOUS ABNORMAL: NORMAL
PATH REPORT.COMMENTS IMP SPEC: NORMAL
PATH REPORT.COMMENTS IMP SPEC: NORMAL
PATH REPORT.RELEVANT HX SPEC: NORMAL

## 2022-04-30 DIAGNOSIS — M54.12 CERVICAL NEURALGIA: ICD-10-CM

## 2022-04-30 DIAGNOSIS — Z79.899 CONTROLLED SUBSTANCE AGREEMENT SIGNED: ICD-10-CM

## 2022-05-02 ENCOUNTER — MYC REFILL (OUTPATIENT)
Dept: FAMILY MEDICINE | Facility: CLINIC | Age: 67
End: 2022-05-02
Payer: COMMERCIAL

## 2022-05-02 DIAGNOSIS — F90.0 ATTENTION DEFICIT HYPERACTIVITY DISORDER (ADHD), PREDOMINANTLY INATTENTIVE TYPE: ICD-10-CM

## 2022-05-03 NOTE — TELEPHONE ENCOUNTER
Routing refill request to provider for review/approval because:  Controlled substance request    Last Written Prescription Date:  11/11/21  Last Fill Quantity: 90,  # refills: 5   Last office visit provider:  3/29/22     Requested Prescriptions   Pending Prescriptions Disp Refills     Pregabalin (LYRICA) 200 MG capsule [Pharmacy Med Name: PREGABALIN 200MG CAPSULES] 90 capsule      Sig: TAKE 1 CAPSULE BY MOUTH THREE TIMES DAILY       There is no refill protocol information for this order          Jose Rea RN 05/03/22 1:43 PM

## 2022-05-04 RX ORDER — PREGABALIN 200 MG/1
CAPSULE ORAL
Qty: 90 CAPSULE | Refills: 0 | Status: SHIPPED | OUTPATIENT
Start: 2022-05-04 | End: 2022-06-02

## 2022-05-04 NOTE — TELEPHONE ENCOUNTER
Controlled Substance Refill Request for   CONCERTA 54 MG CR tablet    Last refill: 3/29/22    Last clinic visit: 3/29/22    Clinic visit frequency required: unable to determine  Next appt: none scheduled     Controlled substance agreement on file: Yes:  Date 04/08/2022. (non-opioid)    Documentation in problem list reviewed:  Yes    Processing:  Rx to be electronically transmitted to pharmacy by provider       Cyndy Mcdermott RN   [facility]

## 2022-05-04 NOTE — TELEPHONE ENCOUNTER
Diagnosis: 1) ADHD 2) insomnia 3) chronic pain  Medication : 1) Methylphenidate 54 mg CR 2) zolpidem 10 mg 3 pregabalin 200 mg tid  Quantity per month: 1) 30) 30 3) 90  Number of refills before follow up visit: 6 months - may be by lissett SALAMANCA 3/29/22  Urine drug screen 11/11/21 04/06/2022  1   03/29/2022  Concerta Er 54 MG Tablet    30.00  30 Ma Win   [id number]   Wal (5605[place]   [place]   04/03/2022  1   11/11/2021  Pregabalin 200 MG Capsule    90.00  30 Ma Win   [id number]   Wal (5605)   5/5  4.02 L[place]   03/31/2022  1   03/29/2022  Zolpidem Tartrate 10 MG Tablet    90.00  90 Ma Win   [id number]   Wal (5605)   0/1  0.50 [place]   03/08/2022  1   03/04/2022  Concerta Er 54 MG Tablet    30.00  30 Ma Win   [id number]   Wal (5605[place]   [place]   03/04/2022  1   11/11/2021  Pregabalin 200 MG Capsule    90.00  30 Ma Win   [id number]   Wal (5605)   4/5  4.02 LME  Comm Ins

## 2022-05-06 ENCOUNTER — MYC REFILL (OUTPATIENT)
Dept: FAMILY MEDICINE | Facility: CLINIC | Age: 67
End: 2022-05-06
Payer: COMMERCIAL

## 2022-05-06 ENCOUNTER — MYC MEDICAL ADVICE (OUTPATIENT)
Dept: FAMILY MEDICINE | Facility: CLINIC | Age: 67
End: 2022-05-06
Payer: COMMERCIAL

## 2022-05-06 DIAGNOSIS — F90.0 ATTENTION DEFICIT HYPERACTIVITY DISORDER (ADHD), PREDOMINANTLY INATTENTIVE TYPE: ICD-10-CM

## 2022-05-06 RX ORDER — METHYLPHENIDATE HYDROCHLORIDE 54 MG/1
54 TABLET, EXTENDED RELEASE ORAL EVERY MORNING
Qty: 30 TABLET | Refills: 0 | OUTPATIENT
Start: 2022-05-06

## 2022-05-06 NOTE — TELEPHONE ENCOUNTER
Routing refill request to provider for review/approval because:  Drug not on the [facility] [place] [place] refill protocol   Controlled substance    Last Written Prescription Date:  3/29/2022  Last Fill Quantity: 30,  # refills: 0   Last office visit provider:  3/29/2022     Requested Prescriptions   Pending Prescriptions Disp Refills     CONCERTA 54 MG CR tablet 30 tablet 0     Sig: Take 1 tablet (54 mg) by mouth every morning BRAND NAME CONCERTA as covered by insruance- not generic       There is no refill protocol information for this order          Klaudia Hall RN 05/06/22 12:25 AM

## 2022-05-06 NOTE — TELEPHONE ENCOUNTER
Routing refill request to provider for review/approval because:  Controlled substance request    Last Written Prescription Date:  3/29/22  Last Fill Quantity: 30,  # refills: 0   Last office visit provider:  3/29/22     Requested Prescriptions   Pending Prescriptions Disp Refills     CONCERTA 54 MG CR tablet 30 tablet 0     Sig: Take 1 tablet (54 mg) by mouth every morning BRAND NAME CONCERTA as covered by insruance- not generic       There is no refill protocol information for this order          Maegan Abdullahi 05/06/22 6:41 PM

## 2022-05-09 RX ORDER — METHYLPHENIDATE HYDROCHLORIDE 54 MG/1
54 TABLET, EXTENDED RELEASE ORAL EVERY MORNING
Qty: 30 TABLET | Refills: 0 | Status: SHIPPED | OUTPATIENT
Start: 2022-05-09 | End: 2022-06-06

## 2022-05-23 ENCOUNTER — MYC MEDICAL ADVICE (OUTPATIENT)
Dept: FAMILY MEDICINE | Facility: CLINIC | Age: 67
End: 2022-05-23
Payer: COMMERCIAL

## 2022-05-23 DIAGNOSIS — G47.00 PERSISTENT INSOMNIA: Primary | ICD-10-CM

## 2022-05-23 NOTE — TELEPHONE ENCOUNTER
"Routing refill request to provider for review/approval because:  Drug not on the [facility] refill protocol     Outpatient Medication Detail     Disp Refills Start End MITCH   traZODone (DESYREL) 100 MG tablet 90 tablet 3 5/21/2021  No   Sig: TAKE 1 TABLET BY MOUTH AT BEDTIME   Sent to pharmacy as: traZODone 100 mg tablet (DESYREL)   E-Prescribing Status: Receipt confirmed by pharmacy (5/21/2021  9:04 PM CDT)       traZODone (DESYREL) 100 MG tablet [[id number]]    Electronically signed by: Nemo Morgan RN on 05/21/21 2104 Status: Active   Ordering user: Nemo Morgan RN 05/21/21 2104 Ordering provider: Avril Hernandez MD   Authorized by: Avril Hernandez MD   Frequency:  05/21/21 - Until Discontinued Released by: Nemo Morgan RN 05/21/21 2104   Diagnoses  Primary insomnia [F51.01]       Last office visit provider:  3/29/22     Requested Prescriptions   Pending Prescriptions Disp Refills     traZODone (DESYREL) 100 MG tablet [Pharmacy Med Name: TRAZODONE 100MG TABLETS] 90 tablet      Sig: TAKE 1 TABLET BY MOUTH AT BEDTIME       Serotonin Modulators Passed - 5/23/2022 12:40 PM        Passed - Recent (12 mo) or future (30 days) visit within the authorizing provider's specialty     Patient has had an office visit with the authorizing provider or a provider within the authorizing providers department within the previous 12 mos or has a future within next 30 days. See \"Patient Info\" tab in inbasket, or \"Choose Columns\" in Meds & Orders section of the refill encounter.              Passed - Medication is active on med list        Passed - Patient is age 18 or older        Passed - No active pregnancy on record        Passed - No positive pregnancy test in past 12 months             Melody Ureña RN 05/23/22 3:27 PM  "

## 2022-05-24 RX ORDER — TRAZODONE HYDROCHLORIDE 100 MG/1
TABLET ORAL
Qty: 90 TABLET | Refills: 3 | Status: SHIPPED | OUTPATIENT
Start: 2022-05-24 | End: 2023-03-20

## 2022-05-24 NOTE — TELEPHONE ENCOUNTER
traZODone (DESYREL) 100 MG tablet 90 tablet 3 5/21/2021  No   Sig: TAKE 1 TABLET BY MOUTH AT BEDTIME   Sent to pharmacy as: traZODone 100 mg tablet (DESYREL)   E-Prescribing Status: Receipt confirmed by pharmacy (5/21/2021  9:04 PM CDT)     Last prescription above

## 2022-05-25 NOTE — TELEPHONE ENCOUNTER
Medication sent to pharmacy on 5/24/22.  Patient notified though mychart. Alo Tirado RN on 5/25/2022 at 9:58 AM

## 2022-05-31 DIAGNOSIS — M54.12 CERVICAL NEURALGIA: ICD-10-CM

## 2022-06-02 RX ORDER — PREGABALIN 200 MG/1
CAPSULE ORAL
Qty: 90 CAPSULE | Refills: 2 | Status: SHIPPED | OUTPATIENT
Start: 2022-06-03 | End: 2022-08-26

## 2022-06-02 NOTE — TELEPHONE ENCOUNTER
Routing refill request to provider for review/approval because:  Drug not on the [facility] refill protocol     Last Written Prescription Date:  5/4/22  Last Fill Quantity: 90,  # refills: 0   Last office visit provider:  3/29/22     Requested Prescriptions   Pending Prescriptions Disp Refills     Pregabalin (LYRICA) 200 MG capsule [Pharmacy Med Name: PREGABALIN 200MG CAPSULES] 90 capsule      Sig: TAKE 1 CAPSULE BY MOUTH THREE TIMES DAILY       There is no refill protocol information for this order          Ирина Naidu RN 06/01/22 7:45 PM

## 2022-06-02 NOTE — TELEPHONE ENCOUNTER
Diagnosis: 1) ADHD 2) insomnia 3) chronic pain  Medication : 1) Methylphenidate 54 mg CR 2) zolpidem 10 mg 3 pregabalin 200 mg tid  Quantity per month: 1) 30) 30 3) 90  Number of refills before follow up visit: 6 months - may be by lissett SALAMANCA 3/29/22  Urine drug screen 11/11/21    Last visit 3/9/22    05/09/2022  1   05/09/2022  Concerta Er 54 MG Tablet    30.00  30 Ma Win   [id number]   Wal (5605)   [place]   05/04/2022  1   05/04/2022  Pregabalin 200 MG Capsule    90.00  30 Ma Win   [id number]   Wal (5605)   0/0  4.02 L[place]   04/06/2022  1   03/29/2022  Concerta Er 54 MG Tablet    30.00  30 Ma Win   [id number]   Wal (5605)   [place]   04/03/2022  1   11/11/2021  Pregabalin 200 MG Capsule    90.00  30 Ma Win   [id number]   Wal (5605)   5/5  4.02 L[place]   03/31/2022  1   03/29/2022  Zolpidem Tartrate 10 MG Tablet    90.00  90 Ma Win   [id number]   Wal (5605)   0/1  0.50 [place]   03/08/2022  1   03/04/2022  Concerta Er 54 MG Tablet    30.00  30 Ma Win   [id number]   Wal (5605)   [place]   03/04/2022  1   11/11/2021  Pregabalin 200 MG Capsule    90.00  30 Ma Win   [id number]   Wal (5605)   4/5  4.02 L[place]   02/07/2022  1   02/04/2022  Concerta Er 54 MG Tablet    30.00  30 Ma Win   [id number]   Wal (5605)   [place]   02/06/2022  1   11/11/2021  Pregabalin 200 MG Capsule    90.00  30 Ma Win   [id number]   Wal (5605)   3/5  4.02 L[place]

## 2022-06-06 ENCOUNTER — MYC REFILL (OUTPATIENT)
Dept: FAMILY MEDICINE | Facility: CLINIC | Age: 67
End: 2022-06-06
Payer: COMMERCIAL

## 2022-06-06 DIAGNOSIS — F90.0 ATTENTION DEFICIT HYPERACTIVITY DISORDER (ADHD), PREDOMINANTLY INATTENTIVE TYPE: ICD-10-CM

## 2022-06-07 RX ORDER — METHYLPHENIDATE HYDROCHLORIDE 54 MG/1
54 TABLET, EXTENDED RELEASE ORAL EVERY MORNING
Qty: 30 TABLET | Refills: 0 | Status: SHIPPED | OUTPATIENT
Start: 2022-06-08 | End: 2022-07-06

## 2022-06-07 NOTE — TELEPHONE ENCOUNTER
Medication: Concerta       CSA in last year: YES    Random Utox in last year: YES  (if any of the above answer NO - schedule with PCP)     On opioids in addition to benzodiazepines? NO  (if the above answer YES - schedule with PCP every 6 months)           PROVIDER TO PULL THE FOLLOWING FROM  :    1. Last date filled?  2.   Only PCP Prescribing?  3.   Taken as prescribed from physician notes?

## 2022-06-07 NOTE — TELEPHONE ENCOUNTER
Last refills  CONCERTA 54 MG CR tablet 30 tablet 0 5/9/2022  Yes   Sig - Route: Take 1 tablet (54 mg) by mouth every morning BRAND NAME CONCERTA as covered by insruance- not generic - Oral   Sent to pharmacy as: Concerta 54 MG Oral Tablet Extended Release   Class: E-Prescribe   Earliest Fill Date: 5/9/2022   Order: [id number]   E-Prescribing Status: Receipt confirmed by pharmacy (5/9/2022  8:57 AM CDT)     06/02/2022  1   06/02/2022  Pregabalin 200 MG Capsule    90.00  30 Ma Win   [id number]   Wal (5605)   0/2  4.02 [place]   05/09/2022  1   05/09/2022  Concerta Er 54 MG Tablet    30.00  30 Ma Win   [id number]   Wal (5605)   [place]   05/04/2022  1   05/04/2022  Pregabalin 200 MG Capsule    90.00  30 Ma Win   [id number]   Wal (5605)   0/0  4.02 L[place]   04/06/2022  1   03/29/2022  Concerta Er 54 MG Tablet    30.00  30 Ma Win   [id number]   Wal (5605)   [place]   04/03/2022  1   11/11/2021  Pregabalin 200 MG Capsule    90.00  30 Ma Win   [id number]   Wal (5605)   5/5  4.02 L[place]   03/31/2022  1   03/29/2022  Zolpidem Tartrate 10 MG Tablet    90.00  90 Ma Win   [id number]   Wal (5605)   0/1  0.50 [place]   03/08/2022  1   03/04/2022  Concerta Er 54 MG Tablet    30.00  30 Ma Win   [id number]   Wal (5605)   0/0   Comm [facility]

## 2022-06-07 NOTE — TELEPHONE ENCOUNTER
Routing refill request to provider for review/approval because:  Drug not on the [facility] refill protocol     Last Written Prescription Date:  5/9/22  Last Fill Quantity: 30,  # refills: 0   Last office visit provider:  3/29/22     Requested Prescriptions   Pending Prescriptions Disp Refills     CONCERTA 54 MG CR tablet 30 tablet 0     Sig: Take 1 tablet (54 mg) by mouth every morning BRAND NAME CONCERTA as covered by insruance- not generic       There is no refill protocol information for this order          Ирина Naidu RN 06/06/22 10:24 PM

## 2022-06-10 ENCOUNTER — ANCILLARY PROCEDURE (OUTPATIENT)
Dept: BONE DENSITY | Facility: CLINIC | Age: 67
End: 2022-06-10
Attending: FAMILY MEDICINE
Payer: COMMERCIAL

## 2022-06-10 DIAGNOSIS — Z78.0 POST-MENOPAUSAL: ICD-10-CM

## 2022-06-10 PROCEDURE — 77080 DXA BONE DENSITY AXIAL: CPT | Performed by: INTERNAL MEDICINE

## 2022-06-15 ENCOUNTER — TELEPHONE (OUTPATIENT)
Dept: FAMILY MEDICINE | Facility: CLINIC | Age: 67
End: 2022-06-15
Payer: COMMERCIAL

## 2022-06-15 ENCOUNTER — MYC MEDICAL ADVICE (OUTPATIENT)
Dept: FAMILY MEDICINE | Facility: CLINIC | Age: 67
End: 2022-06-15
Payer: COMMERCIAL

## 2022-06-15 NOTE — TELEPHONE ENCOUNTER
Reason for Call:  Other call back    Detailed comments: PT has further questions and concerns regarding her bone density    Phone Number Patient can be reached at: Home number on file [phone] (home)    Best Time: anytime    Can we leave a detailed message on this number? YES    Call taken on 6/15/2022 at 9:44 AM by Skylar Villegas

## 2022-06-16 DIAGNOSIS — M81.0 AGE-RELATED OSTEOPOROSIS WITHOUT CURRENT PATHOLOGICAL FRACTURE: Primary | ICD-10-CM

## 2022-06-17 ENCOUNTER — LAB (OUTPATIENT)
Dept: LAB | Facility: CLINIC | Age: 67
End: 2022-06-17
Payer: COMMERCIAL

## 2022-06-17 DIAGNOSIS — M81.0 AGE-RELATED OSTEOPOROSIS WITHOUT CURRENT PATHOLOGICAL FRACTURE: ICD-10-CM

## 2022-06-17 DIAGNOSIS — I50.32 CHRONIC DIASTOLIC CHF (CONGESTIVE HEART FAILURE), NYHA CLASS 2 (H): ICD-10-CM

## 2022-06-17 LAB
ERYTHROCYTE [DISTWIDTH] IN BLOOD BY AUTOMATED COUNT: 13 % (ref 10–15)
HCT VFR BLD AUTO: 39.6 % (ref 35–47)
HGB BLD-MCNC: 13.2 G/DL (ref 11.7–15.7)
MCH RBC QN AUTO: 31.4 PG (ref 26.5–33)
MCHC RBC AUTO-ENTMCNC: 33.3 G/DL (ref 31.5–36.5)
MCV RBC AUTO: 94 FL (ref 78–100)
PHOSPHATE SERPL-MCNC: 3 MG/DL (ref 2.5–4.5)
PLATELET # BLD AUTO: 207 10E3/UL (ref 150–450)
RBC # BLD AUTO: 4.21 10E6/UL (ref 3.8–5.2)
TSH SERPL DL<=0.005 MIU/L-ACNC: 0.49 UIU/ML (ref 0.3–5)
WBC # BLD AUTO: 7.4 10E3/UL (ref 4–11)

## 2022-06-17 PROCEDURE — 36415 COLL VENOUS BLD VENIPUNCTURE: CPT

## 2022-06-17 PROCEDURE — 84100 ASSAY OF PHOSPHORUS: CPT

## 2022-06-17 PROCEDURE — 80053 COMPREHEN METABOLIC PANEL: CPT

## 2022-06-17 PROCEDURE — 85027 COMPLETE CBC AUTOMATED: CPT

## 2022-06-17 PROCEDURE — 82306 VITAMIN D 25 HYDROXY: CPT

## 2022-06-17 PROCEDURE — 84443 ASSAY THYROID STIM HORMONE: CPT

## 2022-06-18 LAB — DEPRECATED CALCIDIOL+CALCIFEROL SERPL-MC: 57 UG/L (ref 20–75)

## 2022-06-23 ENCOUNTER — VIRTUAL VISIT (OUTPATIENT)
Dept: FAMILY MEDICINE | Facility: CLINIC | Age: 67
End: 2022-06-23
Payer: COMMERCIAL

## 2022-06-23 DIAGNOSIS — E78.2 MIXED HYPERLIPIDEMIA: ICD-10-CM

## 2022-06-23 DIAGNOSIS — M81.0 AGE-RELATED OSTEOPOROSIS WITHOUT CURRENT PATHOLOGICAL FRACTURE: Primary | ICD-10-CM

## 2022-06-23 LAB
ALBUMIN SERPL-MCNC: 3.7 G/DL (ref 3.5–5)
ALP SERPL-CCNC: 51 U/L (ref 45–120)
ALT SERPL W P-5'-P-CCNC: 16 U/L (ref 0–45)
ANION GAP SERPL CALCULATED.3IONS-SCNC: 11 MMOL/L (ref 5–18)
AST SERPL W P-5'-P-CCNC: 20 U/L (ref 0–40)
BILIRUB SERPL-MCNC: 0.8 MG/DL (ref 0–1)
BUN SERPL-MCNC: 18 MG/DL (ref 8–22)
CALCIUM SERPL-MCNC: 8.9 MG/DL (ref 8.5–10.5)
CHLORIDE BLD-SCNC: 107 MMOL/L (ref 98–107)
CO2 SERPL-SCNC: 24 MMOL/L (ref 22–31)
CREAT SERPL-MCNC: 0.7 MG/DL (ref 0.6–1.1)
GFR SERPL CREATININE-BSD FRML MDRD: >90 ML/MIN/1.73M2
GLUCOSE BLD-MCNC: 144 MG/DL (ref 70–125)
POTASSIUM BLD-SCNC: 3.9 MMOL/L (ref 3.5–5)
PROT SERPL-MCNC: 6 G/DL (ref 6–8)
SODIUM SERPL-SCNC: 142 MMOL/L (ref 136–145)

## 2022-06-23 PROCEDURE — 99213 OFFICE O/P EST LOW 20 MIN: CPT | Mod: GT | Performed by: FAMILY MEDICINE

## 2022-06-23 RX ORDER — ALENDRONATE SODIUM 70 MG/1
70 TABLET ORAL
Qty: 4 TABLET | Refills: 1 | Status: SHIPPED | OUTPATIENT
Start: 2022-06-23 | End: 2022-07-09

## 2022-06-23 NOTE — PROGRESS NOTES
Remedios is a 67 year old who is being evaluated via a billable video visit.      How would you like to obtain your AVS? MyChart  If the video visit is dropped, the invitation should be resent by: Text to cell phone: [phone]  Will anyone else be joining your video visit? No      Assessment and Plan    Age-related osteoporosis without current pathological fracture  comprehensive metabolic panel came back later - normal renal function - start   - alendronate (FOSAMAX) 70 MG tablet  Dispense: 4 tablet; Refill: 1    She will let me know how this goes.  Also discussed need to reassess bone density in 2 years      Answers for HPI/ROS submitted by the patient on 6/20/2022  What is the reason for your visit today? : Test results  How many servings of fruits and vegetables do you eat daily?: 2-3  On average, how many sweetened beverages do you drink each day (Examples: soda, juice, sweet tea, etc.  Do NOT count diet or artificially sweetened beverages)?: 1  How many minutes a day do you exercise enough to make your heart beat faster?: 60 or more  How many days a week do you exercise enough to make your heart beat faster?: 6  How many days per week do you miss taking your medication?: 0        Subjective   Remedios is a 67 year old presenting for the following health issues:    Radiology Visit (Go over results)      HPI     DEXA 6/10/22 - results:  Patient Profile:  67 year old female, postmenopausal, is here for the first bone density test.   History of fractures - Yes, Ankle. Family history of osteoporosis - Yes; mother.  Family history of hip fracture: None. Smoking history - No. Osteoporosis treatment past -  No. Osteoporosis treatment current - No.  Chronic medical problems - Chronic low back, Discectomy L4-L5.  High risk medications -  None.     Assessment:     1. The spine bone density is assessed using L2-L4 with T-score of -3.1.  2. Femoral bone densities show left femoral neck T- score of -2.2, and right total hip  T-score of -2.3.  3.  she does not have a previous scan available for comparison.  4.  The trabecular bone score predicts moderate microarchitecture  .     67 year old female with OSTEOPOROSIS and HIGH predicted future fracture risk.  ---  Remedios comes today to discuss the results of her bone density scan above.  We discussed action of,  treatment with  Ad side effects of  - Treatment with bisphosphonates including alendronate and also request  - Prolia  - Goal to increase bone density and then likely have a drug holiday    Also discussed need to assess kidney function.  For some reason comprehensive panel was not drawn at her recent visit, we can add this onto the lab draw today.    Remedios says her mother and sister  have taken alendronate, although they have not always remember to take it.  Remedios is good about taking her medications and sees no issue with a once a week dose without other medications          Review of Systems         Objective           Vitals:  No vitals were obtained today due to virtual visit.    Physical Exam   General appearance - alert, well appearing, and in no distress  Mental status - normal mood, behavior, speech,dress, motor activity, and thought processes  Chest - breathing well and without labor  Neurological - alert, oriented, normal speech, no focal findings or movement disorder noted        Video-Visit Details    Video Start Time: 1:00 PM    Type of service:  Video Visit    Video End Time:1:12 PM    Originating Location (pt. Location): Home    Distant Location (provider location):  [facility]     Platform used for Video Visit: [employer]    Montrell Pinto

## 2022-06-24 RX ORDER — ATORVASTATIN CALCIUM 20 MG/1
TABLET, FILM COATED ORAL
Qty: 90 TABLET | Refills: 0 | Status: SHIPPED | OUTPATIENT
Start: 2022-06-24 | End: 2022-09-22

## 2022-06-24 NOTE — TELEPHONE ENCOUNTER
"Routing refill request to provider for review/approval because:  Labs not current:  ldl    Last Written Prescription Date:  3/29/22  Last Fill Quantity: 90,  # refills: 0   Last office visit provider:  3/29/22     Requested Prescriptions   Pending Prescriptions Disp Refills     atorvastatin (LIPITOR) 20 MG tablet [Pharmacy Med Name: ATORVASTATIN 20MG TABLETS] 90 tablet 0     Sig: TAKE 1 TABLET(20 MG) BY MOUTH DAILY       Statins Protocol Failed - 6/23/2022  6:19 AM        Failed - LDL on file in past 12 months     Recent Labs   Lab Test 08/27/20  1016   LDL 95             Passed - No abnormal creatine kinase in past 12 months     No lab results found.             Passed - Recent (12 mo) or future (30 days) visit within the authorizing provider's specialty     Patient has had an office visit with the authorizing provider or a provider within the authorizing providers department within the previous 12 mos or has a future within next 30 days. See \"Patient Info\" tab in inbasket, or \"Choose Columns\" in Meds & Orders section of the refill encounter.              Passed - Medication is active on med list        Passed - Patient is age 18 or older        Passed - No active pregnancy on record        Passed - No positive pregnancy test in past 12 months             Ирина Naidu RN 06/23/22 7:44 PM  "

## 2022-07-06 ENCOUNTER — MYC REFILL (OUTPATIENT)
Dept: FAMILY MEDICINE | Facility: CLINIC | Age: 67
End: 2022-07-06

## 2022-07-06 DIAGNOSIS — F90.0 ATTENTION DEFICIT HYPERACTIVITY DISORDER (ADHD), PREDOMINANTLY INATTENTIVE TYPE: ICD-10-CM

## 2022-07-07 RX ORDER — METHYLPHENIDATE HYDROCHLORIDE 54 MG/1
54 TABLET, EXTENDED RELEASE ORAL EVERY MORNING
Qty: 30 TABLET | Refills: 0 | Status: SHIPPED | OUTPATIENT
Start: 2022-07-08 | End: 2022-08-04

## 2022-07-07 NOTE — CONFIDENTIAL NOTE
Diagnosis: 1) ADHD 2) insomnia 3) chronic pain  Medication : 1) Methylphenidate 54 mg CR 2) zolpidem 10 mg 3 pregabalin 200 mg tid  Quantity per month: 1) 30) 30 3) 90  Number of refills before follow up visit: 6 months - may be by lissett SALAMANCA 3/29/22  Urine drug screen 11/11/21    Last visit 3/29/22    Last refill  CONCERTA 54 MG CR tablet 30 tablet 0 6/8/2022  Yes   Sig - Route: Take 1 tablet (54 mg) by mouth every morning BRAND NAME CONCERTA as covered by [employer]- not generic - Oral   Sent to pharmacy as: Concerta 54 MG Oral Tablet Extended Release   Class: E-Prescribe   Earliest Fill Date: 6/8/2022   Order: [id number]   E-Prescribing Status: Receipt confirmed by pharmacy (6/7/2022  6:28 PM CDT)     07/02/2022  1   03/29/2022  Zolpidem Tartrate 10 MG Tablet    90.00  90 Ma Win   [id number]   [facility]   [place]  0.[place]   06/30/2022  1   06/02/2022  Pregabalin 200 MG Capsule    90.00  30 Ma Win   [id number]   [facility] ([facility]   1/2  4.02 [place]   06/08/2022  1   06/07/2022  Concerta Er 54 MG Tablet    30.00  30 Ma Win   [id number]   [facility]   [place]   06/02/2022  1   06/02/2022  Pregabalin 200 MG Capsule    90.00  30 Ma Win   [id number]   [facility] ([facility]605[facility]   0/2  4.[place]   05/09/2022  1   05/09/2022  Concerta Er 54 MG Tablet    30.00  30 Ma Win   [id number]   [facility]   [place]   05/04/2022  1   05/04/2022  Pregabalin 200 MG Capsule

## 2022-07-08 DIAGNOSIS — M81.0 AGE-RELATED OSTEOPOROSIS WITHOUT CURRENT PATHOLOGICAL FRACTURE: ICD-10-CM

## 2022-07-09 RX ORDER — ALENDRONATE SODIUM 70 MG/1
TABLET ORAL
Qty: 12 TABLET | Refills: 3 | Status: SHIPPED | OUTPATIENT
Start: 2022-07-09 | End: 2023-10-06 | Stop reason: ALTCHOICE

## 2022-07-10 NOTE — TELEPHONE ENCOUNTER
"Last Written Prescription Date:  6/23/22  Last Fill Quantity: 4,  # refills: 1   Last office visit provider:  6/23/22     Requested Prescriptions   Pending Prescriptions Disp Refills     alendronate (FOSAMAX) 70 MG tablet [Pharmacy Med Name: ALENDRONATE 70MG TABLETS] 12 tablet      Sig: TAKE 1 TABLET BY MOUTH EVERY 7 DAYS. TAKE 1/2 HOUR BEFORE EATING WITHOUT OTHER MEDICATIONS AND DO NOT LIE DOWN AFTER TAKING.       Bisphosphonates Passed - 7/8/2022  2:53 PM        Passed - Recent (12 mo) or future (30 days) visit within the authorizing provider's specialty     Patient has had an office visit with the authorizing provider or a provider within the authorizing providers department within the previous 12 mos or has a future within next 30 days. See \"Patient Info\" tab in inbasket, or \"Choose Columns\" in Meds & Orders section of the refill encounter.              Passed - Dexa on file within past 2 years     Please review last Dexa result.           Passed - Medication is active on med list        Passed - Patient is age 18 or older        Passed - Normal serum creatinine on file within past 12 months     Recent Labs   Lab Test 06/17/22  1423   CR 0.70       Ok to refill medication if creatinine is low               Maegan Abdullahi 07/09/22 9:16 PM  "

## 2022-07-28 DIAGNOSIS — M81.0 AGE-RELATED OSTEOPOROSIS WITHOUT CURRENT PATHOLOGICAL FRACTURE: ICD-10-CM

## 2022-07-28 RX ORDER — ALENDRONATE SODIUM 70 MG/1
TABLET ORAL
Qty: 12 TABLET | Refills: 3 | OUTPATIENT
Start: 2022-07-28

## 2022-08-04 ENCOUNTER — MYC REFILL (OUTPATIENT)
Dept: FAMILY MEDICINE | Facility: CLINIC | Age: 67
End: 2022-08-04

## 2022-08-04 DIAGNOSIS — F90.0 ATTENTION DEFICIT HYPERACTIVITY DISORDER (ADHD), PREDOMINANTLY INATTENTIVE TYPE: ICD-10-CM

## 2022-08-05 RX ORDER — METHYLPHENIDATE HYDROCHLORIDE 54 MG/1
54 TABLET, EXTENDED RELEASE ORAL EVERY MORNING
Qty: 30 TABLET | Refills: 0 | Status: SHIPPED | OUTPATIENT
Start: 2022-08-06 | End: 2022-09-06

## 2022-08-05 NOTE — TELEPHONE ENCOUNTER
Diagnosis: 1) ADHD 2) insomnia 3) chronic pain  Medication : 1) Methylphenidate 54 mg CR 2) zolpidem 10 mg 3 pregabalin 200 mg tid  Quantity per month: 1) 30) 30 3) 90  Number of refills before follow up visit: 6 months - may be by lissett SALAMANCA 3/29/22  Urine drug screen 11/11/21 07/28/2022  1   06/02/2022  Pregabalin 200 MG Capsule    90.00  30 Ma Win   [id number]   [facility]   2/[place]  4.[place]   07/09/2022  1   07/07/2022  Concerta Er 54 MG Tablet    30.00  30 Ma Win   [id number]   [facility]   [place]   07/02/2022  1   03/29/2022  Zolpidem Tartrate 10 MG Tablet    90.00  90 Ma Win   [id number]   [facility] ([facility]   0[place]  0.[place]   06/30/2022  1   06/02/2022  Pregabalin 200 MG Capsule    90.00  30 Ma Win   [id number]   [facility] ([facility]   1/2  4.02 [place]   06/08/2022  1   06/07/2022  Concerta Er 54 MG Tablet    30.00  30 Ma Win   [id number]   [facility] (5605)   0/0   Comm [facility]

## 2022-08-10 LAB
ATRIAL RATE - MUSE: 68 BPM
DIASTOLIC BLOOD PRESSURE - MUSE: NORMAL MMHG
INTERPRETATION ECG - MUSE: NORMAL
P AXIS - MUSE: 75 DEGREES
PR INTERVAL - MUSE: 190 MS
QRS DURATION - MUSE: 96 MS
QT - MUSE: 440 MS
QTC - MUSE: 467 MS
R AXIS - MUSE: 33 DEGREES
SYSTOLIC BLOOD PRESSURE - MUSE: NORMAL MMHG
T AXIS - MUSE: 53 DEGREES
VENTRICULAR RATE- MUSE: 68 BPM

## 2022-08-26 DIAGNOSIS — M54.12 CERVICAL NEURALGIA: ICD-10-CM

## 2022-08-26 RX ORDER — PREGABALIN 200 MG/1
CAPSULE ORAL
Qty: 90 CAPSULE | Refills: 0 | Status: SHIPPED | OUTPATIENT
Start: 2022-08-27 | End: 2022-09-20

## 2022-08-26 NOTE — TELEPHONE ENCOUNTER
08/08/2022  1   08/05/2022  Concerta Er 54 MG Tablet    30.00  30 Ma Win   [id number]   [facility]   [place]   07/28/2022  1   06/02/2022  Pregabalin 200 MG Capsule    90.00  30 Ma Win   [id number]   [facility] (5605[facility]   2/2  4.02 [place]   07/09/2022  1   07/07/2022  Concerta Er 54 MG Tablet    30.00  30 Ma Win   [id number]   [facility]605[facility]   [place]   07/02/2022  1   03/29/2022  Zolpidem Tartrate 10 MG Tablet    90.00  90 Ma Win   [id number]   [facility] ([facility]605[facility]   0/[place]  0.50 [place]   06/30/2022  1   06/02/2022  Pregabalin 200 MG Capsule    90.00  30 Ma Win   [id number]   [facility] ([facility]605[facility]   1/2  4.02 [place]   06/08/2022  1   06/07/2022  Concerta Er 54 MG Tablet    30.00  30 Ma Win   [id number]   [facility]605[facility]   [place]      Diagnosis: 1) ADHD 2) insomnia 3) chronic pain  Medication : 1) Methylphenidate 54 mg CR 2) zolpidem 10 mg 3 pregabalin 200 mg tid  Quantity per month: 1) 30) 30 3) 90  Number of refills before follow up visit: 6 months - may be by lissett SALAMANCA 3/29/22  Urine drug screen 11/11/21

## 2022-09-03 ENCOUNTER — HEALTH MAINTENANCE LETTER (OUTPATIENT)
Age: 67
End: 2022-09-03

## 2022-09-06 ENCOUNTER — MYC REFILL (OUTPATIENT)
Dept: FAMILY MEDICINE | Facility: CLINIC | Age: 67
End: 2022-09-06

## 2022-09-06 DIAGNOSIS — F90.0 ATTENTION DEFICIT HYPERACTIVITY DISORDER (ADHD), PREDOMINANTLY INATTENTIVE TYPE: ICD-10-CM

## 2022-09-06 RX ORDER — METHYLPHENIDATE HYDROCHLORIDE 54 MG/1
54 TABLET, EXTENDED RELEASE ORAL EVERY MORNING
Qty: 30 TABLET | Refills: 0 | Status: SHIPPED | OUTPATIENT
Start: 2022-09-06 | End: 2022-10-04

## 2022-09-06 NOTE — TELEPHONE ENCOUNTER
Last PRESCRIPTION:    CONCERTA 54 MG CR tablet 30 tablet 0 8/6/2022  Yes   Sig - Route: Take 1 tablet (54 mg) by mouth every morning BRAND NAME CONCERTA as covered by [employer]- not generic - Oral   Sent to pharmacy as: Concerta 54 MG Oral Tablet Extended Release   Class: E-Prescribe   Earliest Fill Date: 8/6/2022   Order: [id number]   E-Prescribing Status: Receipt confirmed by pharmacy (8/5/2022  5:58 PM CDT)     Diagnosis: 1) ADHD 2) insomnia 3) chronic pain  Medication : 1) Methylphenidate 54 mg CR 2) zolpidem 10 mg 3 pregabalin 200 mg tid  Quantity per month: 1) 30) 30 3) 90  Number of refills before follow up visit: 6 months - may be by lissett SALAMANCA 3/29/22  Urine drug screen 11/11/21 08/26/2022  1   08/26/2022  Pregabalin 200 MG Capsule    90.00  30 Ma Win   [id number]   [facility] ([facility]605[facility]   [place]  4.02 [place]   08/08/2022  1   08/05/2022  Concerta Er 54 MG Tablet    30.00  30 Ma Win   [id number]   [facility]   [place]   07/28/2022  1   06/02/2022  Pregabalin 200 MG Capsule    90.00  30 Ma Win   [id number]   [facility] ([facility]605[facility]   2/2  4.02 [place]   07/09/2022  1   07/07/2022  Concerta Er 54 MG Tablet    30.00  30 Ma Win   [id number]   [facility]   [place]

## 2022-09-11 NOTE — PROGRESS NOTES
Progress Notes by Scotty Jimenez MD at 12/4/2017 12:50 PM     Author: Scotty Jimenez MD Service: -- Author Type: Physician    Filed: 12/4/2017  1:08 PM Encounter Date: 12/4/2017 Status: Signed    : Scotty Jimenez MD (Physician)           Click to link to [facility] HE[facility] NOTE       Assessment/Plan:   1.  Calcified coronary sclerosis: The patient's occasional mild chest pain, lasted for a few seconds, most likely noncardiac.  She had a coronary CT angiogram in 2016 which was reported minimal coronary artery disease.  Continue risk of factor control.    2.  Chronic diastolic congestive heart failure: The patient is compensated well.  No signs of fluid retention.  Continue Lasix 20 mg daily.    3.  Hyperlipidemia: Continue Lipitor 20 mg at bedtime.  Her LDL was dropped to 83, HDL 60.    4.  Minimal carotid artery stenosis: Observe it.    Thank you for the opportunity to be involved in the care of Cindy Beltrán. If you have any questions, please feel free to contact me.  I will see the patient again in 12 months.    Much or all of the text in this note was generated through the use of Dragon Dictate voice-to-text software. Errors in spelling or words which seem out of context are unintentional.   Sound alike errors, in particular, may have escaped editing.       History of Present Illness:   It is my pleasure to see Cindy Beltrán at the [facility] clinic for evaluation of Follow-up.  Cindy Beltrán is a 62 y.o. female with a medical history of calcified coronary atherosclerosis, mild carotid artery stenosis, chronic diastolic CHF, and hyperlipidemia.    The patient states that she has been doing quite well over last year.  She has occasional mild aching chest pain, not associated with exertion or shortness of breath.  It lasted for few seconds.  She has been doing exercises regularly without chest pain or dyspnea on exertion.  She has no palpitations, dizziness,  orthopnea, PND.  She has occasional mild leg edema.  Currently she is on Lasix 20 mg daily which controlled her leg edema well.  Her blood pressure and heart rate are controlled.  She has no side effects from her current medications.    Past Medical History:     Patient Active Problem List   Diagnosis   ? History of narcotic use   ? Alcohol dependence in remission   ? Cervical neuralgia   ? Hyperlipidemia   ? ADD (attention deficit disorder)   ? Mitral regurgitation   ? Chronic midline low back pain   ? Idiopathic cardiomyopathy   ? Left carotid stenosis   ? Primary insomnia       Past Surgical History:     Past Surgical History:   Procedure Laterality Date   ? BREAST BIOPSY Right     benign    ? COLPOSCOPY     ? dental implant  2012   ? HIP ARTHROSCOPY W/ LABRAL REPAIR Right 2012   ? LAMINECTOMY AND MICRODISCECTOMY LUMBAR SPINE  12/17/2014    Procedure: Decpmpression Lumbar Minimally invasive once level; surgeon: Tripp Clifton MD; LOC: [facility]   ? OTHER SURGICAL HISTORY  2004    Pr Biopsy/Excise Cervical Lesion; Polyps   ? TUMOR REMOVAL  1987    Parotid Gland, Benign   ? Xr Mammo Bilateral Diagnostic (Ia)  03/2009       Family History:     Family History   Problem Relation Age of Onset   ? Alcohol abuse Mother    ? Heart disease Mother    ? Hypertension Mother    ? Hyperlipidemia Mother    ? Heart disease Father    ? Hypertension Father    ? Hyperlipidemia Father    ? Alcohol abuse Sister    ? Breast cancer Sister    ? Alcohol abuse Brother      age 50   ? Drug abuse Son    ? Breast cancer Maternal Aunt      x3, ages 60's   ? Colon cancer Paternal Uncle         Social History:    reports that she has never smoked. She has never used smokeless tobacco. She reports that she does not drink alcohol or use illicit drugs.    Review of Systems:   General: WNL  Eyes: WNL  Ears/Nose/Throat: WNL  Lungs: WNL  Heart: WNL  Stomach: WNL  Bladder: WNL  Muscle/Joints: WNL  Skin: WNL  Nervous System: WNL  Mental Health: WNL      Blood: WNL    Meds:     Current Outpatient Prescriptions:   ?  aspirin 81 MG EC tablet, Take 81 mg by mouth daily., Disp: , Rfl:   ?  atorvastatin (LIPITOR) 20 MG tablet, TAKE 1 TABLET (20 MG TOTAL) BY MOUTH DAILY., Disp: 30 tablet, Rfl: 10  ?  carvedilol (COREG) 25 MG tablet, TAKE 1 TABLET (25 MG TOTAL) BY MOUTH 2 (TWO) TIMES A DAY WITH MEALS., Disp: 180 tablet, Rfl: 2  ?  furosemide (LASIX) 20 MG tablet, TAKE 1 TABLET (20 MG TOTAL) BY MOUTH DAILY., Disp: 90 tablet, Rfl: 1  ?  methylphenidate HCl (CONCERTA) 54 MG CR tablet, One daily, do not fill before 11/29/2017, Disp: 30 tablet, Rfl: 0  ?  pregabalin (LYRICA) 200 MG capsule, TAKE 1 CAPSULE BY MOUTH 3 TIMES A DAY., Disp: 90 capsule, Rfl: 5  ?  traZODone (DESYREL) 100 MG tablet, TAKE 1 TABLET AT BEDTIME AS NEEDED, Disp: 30 tablet, Rfl: 5  ?  zolpidem (AMBIEN) 10 mg tablet, One at bedtime as needed for sleep, Dr Caceres providing additional refills, Disp: 30 tablet, Rfl: 5    Allergies:   Review of patient's allergies indicates no known allergies.      Objective:      Physical Exam  108 lb (49 kg)  5' (1.524 m)  Body mass index is 21.09 kg/(m^2).  /78 (Patient Site: Right Arm, Patient Position: Sitting, Cuff Size: Adult Regular)  Pulse 88  Resp 18  Ht 5' (1.524 m)  Wt 108 lb (49 kg)  BMI 21.09 kg/m2    General Appearance:   Awake, Alert, No acute distress.   HEENT:  Pupil equal and reactive to light. No scleral icterus; the mucous membranes were moist.   Neck: Mild cervical bruits. No JVD. No thyromegaly.     Chest: The spine was straight. The chest was symmetric.   Lungs:   Respirations unlabored; Lungs are clear to auscultation. No crackles. No wheezing.   Cardiovascular:   Regular rhythm and rate, normal first and second heart sounds with I/VI systolic murmurs at apex. No rubs or gallops.    Abdomen:  Soft. No tenderness. Non-distended. Bowels sounds are present   Extremities: Equal tibial pulses. No leg edema.   Skin: No rashes or ulcers. Warm,  Dry.   Musculoskeletal: No tenderness. No deformity.   Neurologic: Mood and affect are appropriate. No focal deficits.         EKG: Personally reviewed  Normal sinus rhythm   Low voltage QRS   Borderline ECG   When compared with ECG of 24-NOV-2014 09:02,   Nonspecific T wave abnormality no longer evident in Lateral leads    Cardiac Imaging Studies  ECHO on 8-:  Summary   Normal left ventricular size and systolic function.   Left ventricular ejection fraction is visually estimated to be 55-60%.   E/A flow reversal noted. Suggestive of diastolic dysfunction.   Myxomatous degeneration of mitral valve.   Bileaflet mitral valve prolapse   Moderate mitral regurgitation.Multiple jets observed   ABBIE may be helpful to better define the degree of mitral insufficiency    Coronary CT angiogram on 9-1-2016:  CONCLUSION:    1.  3 mm left upper lobe nodule. Follow-up recommendations are detailed below.  2.  Suspect old infarction of the left ventricular apex and apical septum.  3.  Small hiatal hernia.  4.  Please refer to cardiologist's dictation for the cardiac CT report.    Carotid artery US on 9-1-2017:  CONCLUSION:  1.  Mild atheromatous plaque in the left carotid bifurcation.  2.  Slightly elevated left ICA to CCA ratio, suggesting moderate stenosis (estimated 50-69% diameter narrowing by NASCET criteria).   3.  Mild dampening of the waveforms in the common carotid arteries, possibly representing atherosclerotic disease at the origin of the vessels.    Lab Review   Lab Results   Component Value Date     06/22/2017    K 4.4 07/13/2017     06/22/2017    CO2 28 06/22/2017    BUN 17 06/22/2017    CREATININE 0.73 06/22/2017    CALCIUM 9.3 06/22/2017     Lab Results   Component Value Date    WBC 5.1 11/20/2014    HGB 13.5 07/13/2017    HCT 34.4 (L) 11/20/2014    MCV 92 11/20/2014     11/20/2014     Lab Results   Component Value Date    CHOL 158 06/22/2017    CHOL 264 (H) 06/17/2016     Lab Results    Component Value Date    HDL 60 06/22/2017    HDL 64 06/17/2016     Lab Results   Component Value Date    LDLCALC 83 06/22/2017    LDLCALC 182 (H) 06/17/2016     Lab Results   Component Value Date    TRIG 74 06/22/2017    TRIG 92 06/17/2016                   71

## 2022-09-20 DIAGNOSIS — M54.12 CERVICAL NEURALGIA: ICD-10-CM

## 2022-09-20 RX ORDER — PREGABALIN 200 MG/1
CAPSULE ORAL
Qty: 90 CAPSULE | Refills: 0 | Status: SHIPPED | OUTPATIENT
Start: 2022-09-25 | End: 2022-10-19

## 2022-09-20 NOTE — TELEPHONE ENCOUNTER
Diagnosis: 1) ADHD 2) insomnia 3) chronic pain  Medication : 1) Methylphenidate 54 mg CR 2) zolpidem 10 mg 3 pregabalin 200 mg tid  Quantity per month: 1) 30) 30 3) 90  Number of refills before follow up visit: 6 months - may be by lissett    09/06/2022  1   09/06/2022  Concerta Er 54 MG Tablet    30.00  30 Ma Win   [id number]   [facility]   [place]   08/26/2022  1   08/26/2022  Pregabalin 200 MG Capsule    90.00  30 Ma Win   [id number]   [facility] (5605)   0/[place]  4.02 [place]   08/08/2022  1   08/05/2022  Concerta Er 54 MG Tablet    30.00  30 Ma Win   [id number]   [facility] [place]605[facility]   [place]   07/28/2022  1   06/02/2022  Pregabalin 200 MG Capsule    90.00  30 Ma Win   [id number]   Wal (5605)   2/2  4.02 [place]   07/09/2022  1   07/07/2022  Concerta Er 54 MG Tablet    30.00  30 Ma Win   [id number]   [facility] ([facility]605[facility]   [place]

## 2022-09-22 DIAGNOSIS — E78.2 MIXED HYPERLIPIDEMIA: ICD-10-CM

## 2022-09-23 RX ORDER — ATORVASTATIN CALCIUM 20 MG/1
TABLET, FILM COATED ORAL
Qty: 90 TABLET | OUTPATIENT
Start: 2022-09-23

## 2022-09-28 DIAGNOSIS — F51.01 PRIMARY INSOMNIA: ICD-10-CM

## 2022-09-28 RX ORDER — ZOLPIDEM TARTRATE 10 MG/1
TABLET ORAL
Qty: 90 TABLET | Refills: 1 | Status: SHIPPED | OUTPATIENT
Start: 2022-09-29 | End: 2024-06-04

## 2022-09-29 NOTE — TELEPHONE ENCOUNTER
09/06/2022  1   09/06/2022  Concerta Er 54 MG Tablet    30.00  30 Ma Win   [id number]   [facility] [place]605[facility]   [place]   08/26/2022  1   08/26/2022  Pregabalin 200 MG Capsule    90.00  30 Ma Win   [id number]   [facility] (5605)   0/[place]  4.02 [place]   08/08/2022  1   08/05/2022  Concerta Er 54 MG Tablet    30.00  30 Ma Win   [id number]   [facility] [place]605[place]   07/28/2022  1   06/02/2022  Pregabalin 200 MG Capsule    90.00  30 Ma Win   [id number]   [facility] (5605)   2/2  4.02 [place]   07/09/2022  1   07/07/2022  Concerta Er 54 MG Tablet    30.00  30 Ma Win   [id number]   [facility] [place]605[facility]   [place]   07/02/2022  1   03/29/2022  Zolpidem Tartrate 10 MG Tablet    90.00  90 Ma Win   [id number]   [facility] (5605)   0/[place]  0.50 [place]     zolpidem (AMBIEN) 10 MG tablet 90 tablet 1 3/29/2022  No   Sig: TAKE 1 TABLET(10 MG) BY MOUTH AT BEDTIME   Sent to pharmacy as: Zolpidem Tartrate 10 MG Oral Tablet (AMBIEN)   Class: E-Prescribe   Order: [id number]   E-Prescribing Status: Receipt confirmed by pharmacy (3/29/2022 10:41 AM CDT)

## 2022-10-04 ENCOUNTER — VIRTUAL VISIT (OUTPATIENT)
Dept: FAMILY MEDICINE | Facility: CLINIC | Age: 67
End: 2022-10-04
Payer: COMMERCIAL

## 2022-10-04 DIAGNOSIS — F90.0 ATTENTION DEFICIT HYPERACTIVITY DISORDER (ADHD), PREDOMINANTLY INATTENTIVE TYPE: ICD-10-CM

## 2022-10-04 PROCEDURE — 99213 OFFICE O/P EST LOW 20 MIN: CPT | Mod: GT | Performed by: FAMILY MEDICINE

## 2022-10-04 RX ORDER — METHYLPHENIDATE HYDROCHLORIDE 54 MG/1
54 TABLET, EXTENDED RELEASE ORAL EVERY MORNING
Qty: 30 TABLET | Refills: 0 | Status: CANCELLED | OUTPATIENT
Start: 2022-10-04

## 2022-10-04 RX ORDER — METHYLPHENIDATE HYDROCHLORIDE 54 MG/1
54 TABLET, EXTENDED RELEASE ORAL EVERY MORNING
Qty: 30 TABLET | Refills: 0 | Status: SHIPPED | OUTPATIENT
Start: 2022-10-05 | End: 2022-11-01

## 2022-10-04 NOTE — PROGRESS NOTES
Remedios is a 67 year old who is being evaluated via a billable video visit.      How would you like to obtain your AVS? MyChart  If the video visit is dropped, the invitation should be resent by: Text to cell phone: [phone]  Will anyone else be joining your video visit? No        Assessment & Plan     Attention deficit hyperactivity disorder (ADHD), predominantly inattentive type  Well controlled on:  - CONCERTA 54 MG CR tablet  Dispense: 30 tablet; Refill: 0   -  controlled substance agreement and urine drug screen up to date    Return in about 6 months (around 4/4/2023) for medicare annual wellness.    Avril Hernandez MD  [facility]   Remedios is a 67 year old, presenting for the following health issues:  Follow Up and Medication Update      HPI   Answers for HPI/ROS submitted by the patient on 9/27/2022  What is the reason for your visit today? : Follow up as per substance agreement  How many servings of fruits and vegetables do you eat daily?: 2-3  On average, how many sweetened beverages do you drink each day (Examples: soda, juice, sweet tea, etc.  Do NOT count diet or artificially sweetened beverages)?: 0  How many minutes a day do you exercise enough to make your heart beat faster?: 60 or more  How many days a week do you exercise enough to make your heart beat faster?: 6  How many days per week do you miss taking your medication?: 0    ADHD - Concerta CR 54 mg  continues to work well    ROS   - No weight loss   - no stomach issue  - Feels the same      Wt Readings from Last 5 Encounters:   03/29/22 49.3 kg (108 lb 11.2 oz)   11/11/21 49 kg (108 lb)   04/23/21 49.5 kg (109 lb 2 oz)   03/02/21 48.6 kg (107 lb 1.6 oz)   04/29/20 49.9 kg (110 lb)     Eat what she wast, sometimes junk    Likes being on the treadmill - and walks 3.5 miles daily; at 4 miles hips hurt  Social History: Remedios is  - recent trip to [facility] to visit father and her children.  "She has a grandson born on  July 4th grandson - he is adorable, colicky at first  She also stays with her Dad        Objective           Vitals:  No vitals were obtained today due to virtual visit.    Physical Exam   General appearance - alert, well appearing, and in no distress. Also got to \"meet\" her adorable dmitriy King  Mental status - normal mood, behavior, speech,dress, motor activity, and thought processes  Chest - breathing well and without labor  Neurological - alert, oriented, normal speech, no focal findings or movement disorder noted         Video-Visit Details    Video Start Time: 12:03 PM  Type of service:  Video Visit    Video End Time:12:17 PM    Originating Location (pt. Location): Home    Distant Location (provider location):  [facility]     Platform used for Video Visit: Janel"

## 2022-10-19 DIAGNOSIS — M54.12 CERVICAL NEURALGIA: ICD-10-CM

## 2022-10-19 RX ORDER — PREGABALIN 200 MG/1
CAPSULE ORAL
Qty: 90 CAPSULE | Refills: 0 | Status: SHIPPED | OUTPATIENT
Start: 2022-10-25 | End: 2022-11-21

## 2022-10-20 NOTE — TELEPHONE ENCOUNTER
Diagnosis: 1) ADHD 2) insomnia 3) chronic pain  Medication : 1) Methylphenidate 54 mg CR 2) zolpidem 10 mg 3 )pregabalin 200 mg tid  Quantity per month: 1) 30) 30 3) 90  Number of refills before follow up visit: 6 months - may be by lissett SALAMANCA 3/29/22  Urine drug screen 11/11/21     10/05/2022  1   10/04/2022  Concerta Er 54 MG Tablet  30.00  30 Ma Win   [id number]   [facility] [place]   09/29/2022  1   09/28/2022  Zolpidem Tartrate 10 MG Tablet  90.00  90 Ma Win   [id number]   Wal (5605)   0/1  0.50 [place]   09/29/2022  1   09/20/2022  Pregabalin 200 MG Capsule  90.00  30 Ma Win   [id number]   Wal (5605)   0/[place]  4.02 [place]

## 2022-11-01 ENCOUNTER — MYC REFILL (OUTPATIENT)
Dept: FAMILY MEDICINE | Facility: CLINIC | Age: 67
End: 2022-11-01

## 2022-11-01 DIAGNOSIS — Z79.899 CONTROLLED SUBSTANCE AGREEMENT SIGNED: Primary | ICD-10-CM

## 2022-11-01 DIAGNOSIS — F90.0 ATTENTION DEFICIT HYPERACTIVITY DISORDER (ADHD), PREDOMINANTLY INATTENTIVE TYPE: ICD-10-CM

## 2022-11-01 RX ORDER — METHYLPHENIDATE HYDROCHLORIDE 54 MG/1
54 TABLET, EXTENDED RELEASE ORAL EVERY MORNING
Qty: 30 TABLET | Refills: 0 | Status: SHIPPED | OUTPATIENT
Start: 2022-11-04 | End: 2022-11-30

## 2022-11-01 NOTE — TELEPHONE ENCOUNTER
Due for urine screen - I will send her a message    Diagnosis: 1) ADHD 2) insomnia 3) chronic pain  Medication : 1) Methylphenidate 54 mg CR 2) zolpidem 10 mg 3 pregabalin 200 mg tid  Quantity per month: 1) 30) 30 3) 90  Number of refills before follow up visit: 6 months - may be by lissett SALAMANCA 3/29/22  Urine drug screen 11/11/21    10/25/2022  1   10/19/2022  Pregabalin 200 MG Capsule  90.00  30 Ma Win   [id number]   [facility] (5605)   0/[place]  4.[place]   10/05/2022  1   10/04/2022  Concerta Er 54 MG Tablet  30.00  30 Ma Win   [id number]   [facility] [place]605[place]   [place]   09/29/2022  1   09/28/2022  Zolpidem Tartrate 10 MG Tablet  90.00  90 Ma Win   [id number]   Wal (5605)   0/1  0.50 [place]   09/29/2022  1   09/20/2022  Pregabalin 200 MG Capsule  90.00  30 Ma Win   [id number]   Wal (5605)   0/[place]  4.02 [place]   09/06/2022  1   09/06/2022  Concerta Er 54 MG Tablet  30.00  30 Ma Win   [id number]   [facility] [place]605[place]   [place]

## 2022-11-20 DIAGNOSIS — Z79.899 CONTROLLED SUBSTANCE AGREEMENT SIGNED: ICD-10-CM

## 2022-11-20 DIAGNOSIS — M54.12 CERVICAL NEURALGIA: ICD-10-CM

## 2022-11-21 RX ORDER — PREGABALIN 200 MG/1
CAPSULE ORAL
Qty: 90 CAPSULE | Refills: 2 | Status: SHIPPED | OUTPATIENT
Start: 2022-11-23 | End: 2023-02-23

## 2022-11-21 NOTE — TELEPHONE ENCOUNTER
Diagnosis: 1) ADHD 2) insomnia 3) chronic pain  Medication : 1) Methylphenidate 54 mg CR 2) zolpidem 10 mg 3 pregabalin 200 mg tid  Quantity per month: 1) 30) 30 3) 90  Number of refills before follow up visit: 6 months - may be by lissett SALAMANCA 3/29/22  Urine drug screen 11/1/22 11/04/2022  1   11/01/2022  Concerta Er 54 MG Tablet  30.00  30 Ma Win   [id number]   [facility] [place]605[place]   10/25/2022  1   10/19/2022  Pregabalin 200 MG Capsule  90.00  30 Ma Win   [id number]   Wal (5605)   0/0  4.02 [place]   10/05/2022  1   10/04/2022  Concerta Er 54 MG Tablet  30.00  30 Ma Win   [id number]   [facility] [place]605[place]   [place]   09/29/2022  1   09/28/2022  Zolpidem Tartrate 10 MG Tablet  90.00  90 Ma Win   [id number]   Wal (5605)   0/1  0.50 [place]   09/29/2022  1   09/20/2022  Pregabalin 200 MG Capsule  90.00  30 Ma Win   [id number]   Wal (5605)   0/0  4.02 [place]

## 2022-11-30 ENCOUNTER — MYC REFILL (OUTPATIENT)
Dept: FAMILY MEDICINE | Facility: CLINIC | Age: 67
End: 2022-11-30

## 2022-11-30 DIAGNOSIS — F90.0 ATTENTION DEFICIT HYPERACTIVITY DISORDER (ADHD), PREDOMINANTLY INATTENTIVE TYPE: ICD-10-CM

## 2022-11-30 DIAGNOSIS — E78.2 MIXED HYPERLIPIDEMIA: ICD-10-CM

## 2022-12-01 RX ORDER — METHYLPHENIDATE HYDROCHLORIDE 54 MG/1
54 TABLET, EXTENDED RELEASE ORAL EVERY MORNING
Qty: 30 TABLET | Refills: 0 | Status: SHIPPED | OUTPATIENT
Start: 2022-12-01 | End: 2023-01-02

## 2022-12-01 NOTE — TELEPHONE ENCOUNTER
Last visit 10/24/22    Diagnosis: 1) ADHD 2) insomnia 3) chronic pain  Medication : 1) Methylphenidate 54 mg CR 2) zolpidem 10 mg 3 pregabalin 200 mg tid  Quantity per month: 1) 30) 30 3) 90  Number of refills before follow up visit: 6 months - may be by lissett SALAMANCA 3/29/22  Urine drug screen 11/1/22 11/23/2022  1   11/21/2022  Pregabalin 200 MG Capsule  90.00  30 Ma Win   [id number]   Wal (5605)   0/2  4.[place]   11/04/2022  1   11/01/2022  Concerta Er 54 MG Tablet  30.00  30 Ma Win   [id number]   [facility] (5605[place]   [place]   10/25/2022  1   10/19/2022  Pregabalin 200 MG Capsule  90.00  30 Ma Win   [id number]   Wal (5605)   0/0  4.02 [place]   10/05/2022  1   10/04/2022  Concerta Er 54 MG Tablet  30.00  30 Ma Win   [id number]   [facility] (5605[place]   [place]

## 2022-12-01 NOTE — TELEPHONE ENCOUNTER
"Routing refill request to provider for review/approval because:  Labs not current:  ldl    Last Written Prescription Date:  9/22/22  Last Fill Quantity: 30,  # refills: 0   Last office visit provider:  10/4/22     Requested Prescriptions   Pending Prescriptions Disp Refills     atorvastatin (LIPITOR) 20 MG tablet [Pharmacy Med Name: ATORVASTATIN 20MG TABLETS] 30 tablet 0     Sig: TAKE 1 TABLET(20 MG) BY MOUTH DAILY       Statins Protocol Failed - 11/30/2022  3:53 PM        Failed - LDL on file in past 12 months     Recent Labs   Lab Test 08/27/20  1016   LDL 95             Passed - No abnormal creatine kinase in past 12 months     No lab results found.             Passed - Recent (12 mo) or future (30 days) visit within the authorizing provider's specialty     Patient has had an office visit with the authorizing provider or a provider within the authorizing providers department within the previous 12 mos or has a future within next 30 days. See \"Patient Info\" tab in inbasket, or \"Choose Columns\" in Meds & Orders section of the refill encounter.              Passed - Medication is active on med list        Passed - Patient is age 18 or older        Passed - No active pregnancy on record        Passed - No positive pregnancy test in past 12 months             Ирина Naidu RN 12/01/22 10:57 AM  "

## 2022-12-02 DIAGNOSIS — E78.2 MIXED HYPERLIPIDEMIA: ICD-10-CM

## 2022-12-02 RX ORDER — ATORVASTATIN CALCIUM 20 MG/1
TABLET, FILM COATED ORAL
Qty: 30 TABLET | Refills: 0 | Status: SHIPPED | OUTPATIENT
Start: 2022-12-02 | End: 2023-10-06

## 2022-12-05 RX ORDER — ATORVASTATIN CALCIUM 20 MG/1
20 TABLET, FILM COATED ORAL DAILY
Qty: 90 TABLET | Refills: 0 | OUTPATIENT
Start: 2022-12-05

## 2022-12-05 NOTE — TELEPHONE ENCOUNTER
"Routing refill request to provider for review/approval because:  Labs not current:  LDL  Patient requesting 90 day supply.  Outside protocol window to alter this script.    Last Written Prescription Date:  12/2/22  Last Fill Quantity: 30,  # refills: 0   Last office visit provider:  10/4/22     Requested Prescriptions   Pending Prescriptions Disp Refills     atorvastatin (LIPITOR) 20 MG tablet [Pharmacy Med Name: ATORVASTATIN 20MG TABLETS] 90 tablet      Sig: TAKE 1 TABLET(20 MG) BY MOUTH DAILY       Statins Protocol Failed - 12/5/2022  7:47 AM        Failed - LDL on file in past 12 months     Recent Labs   Lab Test 08/27/20  1016   LDL 95             Passed - No abnormal creatine kinase in past 12 months     No lab results found.             Passed - Recent (12 mo) or future (30 days) visit within the authorizing provider's specialty     Patient has had an office visit with the authorizing provider or a provider within the authorizing providers department within the previous 12 mos or has a future within next 30 days. See \"Patient Info\" tab in inbasket, or \"Choose Columns\" in Meds & Orders section of the refill encounter.              Passed - Medication is active on med list        Passed - Patient is age 18 or older        Passed - No active pregnancy on record        Passed - No positive pregnancy test in past 12 months             Jose Rea RN 12/05/22 7:48 AM  "

## 2022-12-06 NOTE — TELEPHONE ENCOUNTER
I already wrote her a note about this - needs Lipid testing    Hi Remedios,     Request for refill of your atorvastatin.  It turns out that when you came in for labs in June, they did not do your lipid panel.  This did not strike me until you requested this refill.  You do have a standing order for a lipid panel.  Please make an appointment for a fasting lab visit at your convenience.  I did give you a short refill of your atorvastatin but want to check this panel before continuing.     Please let me know if you have any questions.     Avril Hernandez MD

## 2022-12-11 ENCOUNTER — MYC MEDICAL ADVICE (OUTPATIENT)
Dept: FAMILY MEDICINE | Facility: CLINIC | Age: 67
End: 2022-12-11

## 2022-12-11 DIAGNOSIS — M81.0 AGE-RELATED OSTEOPOROSIS WITHOUT CURRENT PATHOLOGICAL FRACTURE: Primary | ICD-10-CM

## 2022-12-13 ENCOUNTER — LAB (OUTPATIENT)
Dept: LAB | Facility: CLINIC | Age: 67
End: 2022-12-13
Payer: COMMERCIAL

## 2022-12-13 DIAGNOSIS — E78.2 MIXED HYPERLIPIDEMIA: ICD-10-CM

## 2022-12-13 DIAGNOSIS — Z79.899 CONTROLLED SUBSTANCE AGREEMENT SIGNED: ICD-10-CM

## 2022-12-13 LAB
CHOLEST SERPL-MCNC: 201 MG/DL
CREAT UR-MCNC: 7 MG/DL
HDLC SERPL-MCNC: 70 MG/DL
LDLC SERPL CALC-MCNC: 105 MG/DL
NONHDLC SERPL-MCNC: 131 MG/DL
TRIGL SERPL-MCNC: 130 MG/DL

## 2022-12-13 PROCEDURE — 80061 LIPID PANEL: CPT

## 2022-12-13 PROCEDURE — 80307 DRUG TEST PRSMV CHEM ANLYZR: CPT

## 2022-12-13 PROCEDURE — 36415 COLL VENOUS BLD VENIPUNCTURE: CPT

## 2022-12-15 PROBLEM — M81.0 AGE-RELATED OSTEOPOROSIS WITHOUT CURRENT PATHOLOGICAL FRACTURE: Status: ACTIVE | Noted: 2022-12-15

## 2022-12-15 LAB
ME-PHENIDATE UR CFM-MCNC: 4600 NG/ML
ME-PHENIDATE/CREAT UR: ABNORMAL NG/MG {CREAT}
PREGABALIN UR QL CFM: PRESENT

## 2022-12-15 RX ORDER — METHYLPREDNISOLONE SODIUM SUCCINATE 125 MG/2ML
125 INJECTION, POWDER, LYOPHILIZED, FOR SOLUTION INTRAMUSCULAR; INTRAVENOUS
Status: CANCELLED
Start: 2022-12-15

## 2022-12-15 RX ORDER — MEPERIDINE HYDROCHLORIDE 25 MG/ML
25 INJECTION INTRAMUSCULAR; INTRAVENOUS; SUBCUTANEOUS EVERY 30 MIN PRN
Status: CANCELLED | OUTPATIENT
Start: 2022-12-15

## 2022-12-15 RX ORDER — ZOLEDRONIC ACID 5 MG/100ML
5 INJECTION, SOLUTION INTRAVENOUS ONCE
Status: CANCELLED
Start: 2022-12-15

## 2022-12-15 RX ORDER — EPINEPHRINE 1 MG/ML
0.3 INJECTION, SOLUTION, CONCENTRATE INTRAVENOUS EVERY 5 MIN PRN
Status: CANCELLED | OUTPATIENT
Start: 2022-12-15

## 2022-12-15 RX ORDER — HEPARIN SODIUM,PORCINE 10 UNIT/ML
5 VIAL (ML) INTRAVENOUS
Status: CANCELLED | OUTPATIENT
Start: 2022-12-15

## 2022-12-15 RX ORDER — HEPARIN SODIUM (PORCINE) LOCK FLUSH IV SOLN 100 UNIT/ML 100 UNIT/ML
5 SOLUTION INTRAVENOUS
Status: CANCELLED | OUTPATIENT
Start: 2022-12-15

## 2022-12-15 RX ORDER — DIPHENHYDRAMINE HYDROCHLORIDE 50 MG/ML
50 INJECTION INTRAMUSCULAR; INTRAVENOUS
Status: CANCELLED
Start: 2022-12-15

## 2022-12-15 RX ORDER — ALBUTEROL SULFATE 90 UG/1
1-2 AEROSOL, METERED RESPIRATORY (INHALATION)
Status: CANCELLED
Start: 2022-12-15

## 2022-12-15 RX ORDER — ACETAMINOPHEN 325 MG/1
650 TABLET ORAL ONCE
Status: CANCELLED | OUTPATIENT
Start: 2022-12-15

## 2022-12-15 RX ORDER — ALBUTEROL SULFATE 0.83 MG/ML
2.5 SOLUTION RESPIRATORY (INHALATION)
Status: CANCELLED | OUTPATIENT
Start: 2022-12-15

## 2023-01-02 ENCOUNTER — MYC REFILL (OUTPATIENT)
Dept: FAMILY MEDICINE | Facility: CLINIC | Age: 68
End: 2023-01-02

## 2023-01-02 DIAGNOSIS — F90.0 ATTENTION DEFICIT HYPERACTIVITY DISORDER (ADHD), PREDOMINANTLY INATTENTIVE TYPE: ICD-10-CM

## 2023-01-03 RX ORDER — METHYLPHENIDATE HYDROCHLORIDE 54 MG/1
54 TABLET, EXTENDED RELEASE ORAL EVERY MORNING
Qty: 30 TABLET | Refills: 0 | Status: SHIPPED | OUTPATIENT
Start: 2023-01-03 | End: 2023-01-30

## 2023-01-03 NOTE — TELEPHONE ENCOUNTER
Diagnosis: 1) ADHD 2) insomnia 3) chronic pain  Medication : 1) Methylphenidate 54 mg CR 2) zolpidem 10 mg 3 pregabalin 200 mg tid  Quantity per month: 1) 30) 30 3) 90  Number of refills before follow up visit: 6 months - may be by lissett SALAMANCA 3/29/22  Urine drug screen 11/1/22    Last visit 10/4/22    12/26/2022  1   09/28/2022  Zolpidem Tartrate 10 MG Tablet  90.00  90 Ma Win   [id number]   Wal (5605)   0/[place]  0.50 [place]   12/26/2022  1   11/21/2022  Pregabalin 200 MG Capsule  90.00  30 Ma Win   [id number]   Wal (5605)   1/2  4.02 [place]   12/02/2022  1   12/01/2022  Concerta Er 54 MG Tablet  30.00  30 Ma Win   [id number]   [facility] (5605[place]   [place]   11/23/2022  1   11/21/2022  Pregabalin 200 MG Capsule  90.00  30 Ma Win   [id number]   Wal (5605)   0/2  4.02 [place]   11/04/2022  1   11/01/2022  Concerta Er 54 MG Tablet  30.00  30 Ma Win   [id number]   Wal (5605)   0/0   Comm [facility]   10/25/2022  1   10/19/2022  Pregabalin 200 MG Capsule  90.00  30 Ma Win   [id number]   Wal (5605)   0/0  4.02 LME  Comm [facility]

## 2023-01-23 RX ORDER — HEPARIN SODIUM (PORCINE) LOCK FLUSH IV SOLN 100 UNIT/ML 100 UNIT/ML
5 SOLUTION INTRAVENOUS
Status: CANCELLED | OUTPATIENT
Start: 2023-01-23

## 2023-01-23 RX ORDER — ALBUTEROL SULFATE 0.83 MG/ML
2.5 SOLUTION RESPIRATORY (INHALATION)
Status: CANCELLED | OUTPATIENT
Start: 2023-01-23

## 2023-01-23 RX ORDER — HEPARIN SODIUM,PORCINE 10 UNIT/ML
5 VIAL (ML) INTRAVENOUS
Status: CANCELLED | OUTPATIENT
Start: 2023-01-23

## 2023-01-23 RX ORDER — ALBUTEROL SULFATE 90 UG/1
1-2 AEROSOL, METERED RESPIRATORY (INHALATION)
Status: CANCELLED
Start: 2023-01-23

## 2023-01-23 RX ORDER — DIPHENHYDRAMINE HYDROCHLORIDE 50 MG/ML
50 INJECTION INTRAMUSCULAR; INTRAVENOUS
Status: CANCELLED
Start: 2023-01-23

## 2023-01-23 RX ORDER — ACETAMINOPHEN 325 MG/1
650 TABLET ORAL ONCE
Status: CANCELLED | OUTPATIENT
Start: 2023-01-23

## 2023-01-23 RX ORDER — METHYLPREDNISOLONE SODIUM SUCCINATE 125 MG/2ML
125 INJECTION, POWDER, LYOPHILIZED, FOR SOLUTION INTRAMUSCULAR; INTRAVENOUS
Status: CANCELLED
Start: 2023-01-23

## 2023-01-23 RX ORDER — EPINEPHRINE 1 MG/ML
0.3 INJECTION, SOLUTION INTRAMUSCULAR; SUBCUTANEOUS EVERY 5 MIN PRN
Status: CANCELLED | OUTPATIENT
Start: 2023-01-23

## 2023-01-23 RX ORDER — MEPERIDINE HYDROCHLORIDE 25 MG/ML
25 INJECTION INTRAMUSCULAR; INTRAVENOUS; SUBCUTANEOUS EVERY 30 MIN PRN
Status: CANCELLED | OUTPATIENT
Start: 2023-01-23

## 2023-01-23 RX ORDER — ZOLEDRONIC ACID 5 MG/100ML
5 INJECTION, SOLUTION INTRAVENOUS ONCE
Status: CANCELLED
Start: 2023-01-23

## 2023-01-24 ENCOUNTER — APPOINTMENT (OUTPATIENT)
Dept: LAB | Facility: CLINIC | Age: 68
End: 2023-01-24
Attending: FAMILY MEDICINE
Payer: COMMERCIAL

## 2023-01-24 ENCOUNTER — INFUSION THERAPY VISIT (OUTPATIENT)
Dept: INFUSION THERAPY | Facility: CLINIC | Age: 68
End: 2023-01-24
Attending: FAMILY MEDICINE
Payer: COMMERCIAL

## 2023-01-24 VITALS
WEIGHT: 111.55 LBS | DIASTOLIC BLOOD PRESSURE: 69 MMHG | TEMPERATURE: 98 F | BODY MASS INDEX: 19.04 KG/M2 | HEART RATE: 70 BPM | HEIGHT: 64 IN | SYSTOLIC BLOOD PRESSURE: 112 MMHG | RESPIRATION RATE: 18 BRPM | OXYGEN SATURATION: 98 %

## 2023-01-24 DIAGNOSIS — M81.0 AGE-RELATED OSTEOPOROSIS WITHOUT CURRENT PATHOLOGICAL FRACTURE: Primary | ICD-10-CM

## 2023-01-24 LAB
CALCIUM SERPL-MCNC: 9.4 MG/DL (ref 8.8–10.2)
CREAT SERPL-MCNC: 0.67 MG/DL (ref 0.51–0.95)
GFR SERPL CREATININE-BSD FRML MDRD: >90 ML/MIN/1.73M2

## 2023-01-24 PROCEDURE — 82565 ASSAY OF CREATININE: CPT | Performed by: FAMILY MEDICINE

## 2023-01-24 PROCEDURE — 36415 COLL VENOUS BLD VENIPUNCTURE: CPT | Performed by: FAMILY MEDICINE

## 2023-01-24 PROCEDURE — 82310 ASSAY OF CALCIUM: CPT | Performed by: FAMILY MEDICINE

## 2023-01-24 PROCEDURE — 250N000011 HC RX IP 250 OP 636: Performed by: FAMILY MEDICINE

## 2023-01-24 PROCEDURE — 96365 THER/PROPH/DIAG IV INF INIT: CPT

## 2023-01-24 PROCEDURE — 250N000013 HC RX MED GY IP 250 OP 250 PS 637: Performed by: FAMILY MEDICINE

## 2023-01-24 RX ORDER — ZOLEDRONIC ACID 5 MG/100ML
5 INJECTION, SOLUTION INTRAVENOUS ONCE
Status: COMPLETED | OUTPATIENT
Start: 2023-01-24 | End: 2023-01-24

## 2023-01-24 RX ORDER — ACETAMINOPHEN 325 MG/1
650 TABLET ORAL ONCE
Status: COMPLETED | OUTPATIENT
Start: 2023-01-24 | End: 2023-01-24

## 2023-01-24 RX ADMIN — ZOLEDRONIC ACID 5 MG: 0.05 INJECTION, SOLUTION INTRAVENOUS at 16:22

## 2023-01-24 RX ADMIN — ACETAMINOPHEN 650 MG: 325 TABLET ORAL at 16:03

## 2023-01-24 ASSESSMENT — PAIN SCALES - GENERAL: PAINLEVEL: NO PAIN (0)

## 2023-01-24 NOTE — NURSING NOTE
Chief Complaint   Patient presents with     Blood Draw     IV placement with blood draw by lab RN. Vitals taken and appointment arrived     Sena Poe RN

## 2023-01-24 NOTE — PATIENT INSTRUCTIONS
Dear Cindy Beltrán    Thank you for choosing [facility] ([facility]) for your infusion.  The following information is a summary of our appointment as well as important reminders.      We look forward in seeing you on your next appointment here at [facility] ([facility]).  Please don t hesitate to call us at [phone] to reschedule any of your appointments or to speak with one of the [facility] registered nurses.  It was a pleasure taking care of you today.    Sincerely,    [facility]  [address] [place] [address]  [address]  Phone:  [phone]

## 2023-01-24 NOTE — LETTER
1/24/2023         RE: Cindy Beltrán  [address]  [place] [address]        Dear Colleague,    Thank you for referring your patient, Cindy Beltrán, to the [facility]. Please see a copy of my visit note below.    Infusion Nursing Note:  Cindy Beltrán presents today for Reclast.    Patient seen by provider today: No   present during visit today: Not Applicable.    Note: N/A.    Intravenous Access:  Peripheral IV placed at lab prior to infusion appointment.    Treatment Conditions:  Results reviewed, labs MET treatment parameters, ok to proceed with treatment.    Post Infusion Assessment:  Patient tolerated infusion without incident.  Oserved for 20 minutes due to it being patient's first dose of Reclast.   Blood return noted pre and post infusion.  Site patent and intact, free from redness, edema or discomfort.  No evidence of extravasations.  Access discontinued per protocol.     Discharge Plan:   Patient and/or family verbalized understanding of discharge instructions and all questions answered.  AVS to patient via MYCHART.    Patient discharged in stable condition accompanied by: self.  Departure Mode: Ambulatory.    Administrations This Visit     acetaminophen (TYLENOL) tablet 650 mg     Admin Date  01/24/2023 Action  Given Dose  650 mg Route  Oral Administered By  Romina Vásquez, ADAMA          zoledronic Acid (RECLAST) infusion 5 mg     Admin Date  01/24/2023 Action  New Bag Dose  5 mg Rate  200 mL/hr Route  Intravenous Administered By  Romina Vásquez, RN                Romina Vásquez RN                        Again, thank you for allowing me to participate in the care of your patient.        Sincerely,        Specialty Infusion Nurse

## 2023-01-24 NOTE — LETTER
Date:January 25, 2023      Provider requested that no letter be sent. Do not send.       [facility]

## 2023-01-24 NOTE — PROGRESS NOTES
Infusion Nursing Note:  Cindy Beltrán presents today for Reclast.    Patient seen by provider today: No   present during visit today: Not Applicable.    Note: N/A.    Intravenous Access:  Peripheral IV placed at lab prior to infusion appointment.    Treatment Conditions:  Results reviewed, labs MET treatment parameters, ok to proceed with treatment.    Post Infusion Assessment:  Patient tolerated infusion without incident.  Oserved for 20 minutes due to it being patient's first dose of Reclast.   Blood return noted pre and post infusion.  Site patent and intact, free from redness, edema or discomfort.  No evidence of extravasations.  Access discontinued per protocol.     Discharge Plan:   Patient and/or family verbalized understanding of discharge instructions and all questions answered.  AVS to patient via UnsiloHART.    Patient discharged in stable condition accompanied by: self.  Departure Mode: Ambulatory.    Administrations This Visit     acetaminophen (TYLENOL) tablet 650 mg     Admin Date  01/24/2023 Action  Given Dose  650 mg Route  Oral Administered By  Romina Vásquez RN          zoledronic Acid (RECLAST) infusion 5 mg     Admin Date  01/24/2023 Action  New Bag Dose  5 mg Rate  200 mL/hr Route  Intravenous Administered By  Romina Vásquez RN Panhia Yang, RN

## 2023-01-30 ENCOUNTER — MYC REFILL (OUTPATIENT)
Dept: FAMILY MEDICINE | Facility: CLINIC | Age: 68
End: 2023-01-30
Payer: COMMERCIAL

## 2023-01-30 DIAGNOSIS — F90.0 ATTENTION DEFICIT HYPERACTIVITY DISORDER (ADHD), PREDOMINANTLY INATTENTIVE TYPE: ICD-10-CM

## 2023-01-30 RX ORDER — METHYLPHENIDATE HYDROCHLORIDE 54 MG/1
54 TABLET, EXTENDED RELEASE ORAL EVERY MORNING
Qty: 30 TABLET | Refills: 0 | Status: SHIPPED | OUTPATIENT
Start: 2023-02-02 | End: 2023-03-02

## 2023-01-31 NOTE — TELEPHONE ENCOUNTER
Diagnosis: 1) ADHD 2) insomnia 3) chronic pain  Medication : 1) Methylphenidate 54 mg CR 2) zolpidem 10 mg 3 pregabalin 200 mg tid  Quantity per month: 1) 30) 30 3) 90  Number of refills before follow up visit: 6 months - may be by lissett SALAMANCA 3/29/22  Urine drug screen 11/1/22 01/04/2023  1   01/03/2023  Concerta Er 54 MG Tablet  30.00  30 Ma Win   [id number]   [facility]605[place]   [place]   12/26/2022  1   11/21/2022  Pregabalin 200 MG Capsule  90.00  30 Ma Win   [id number]   Wal (5605)   1/2  4.02 [place]   12/26/2022  1   09/28/2022  Zolpidem Tartrate 10 MG Tablet  90.00  90 Ma Win   [id number]   [facility] (5605)   0/0  0.50 [place]   12/02/2022  1   12/01/2022  Concerta Er 54 MG Tablet  30.00  30 Ma Win   [id number]   [facility] (5605[place]   [place]

## 2023-02-23 DIAGNOSIS — M54.12 CERVICAL NEURALGIA: ICD-10-CM

## 2023-02-23 RX ORDER — PREGABALIN 200 MG/1
CAPSULE ORAL
Qty: 90 CAPSULE | Refills: 1 | Status: SHIPPED | OUTPATIENT
Start: 2023-02-23 | End: 2023-04-18

## 2023-02-23 NOTE — TELEPHONE ENCOUNTER
Diagnosis: 1) ADHD 2) insomnia 3) chronic pain  Medication : 1) Methylphenidate 54 mg CR 2) zolpidem 10 mg 3 pregabalin 200 mg tid  Quantity per month: 1) 30) 30 3) 90  Number of refills before follow up visit: 6 months - may be by lissett SALAMANCA 3/29/22  Urine drug screen 11/1/22    Last visi 10/4/22    02/02/2023  1   01/30/2023  Concerta Er 54 MG Tablet  30.00  30 Ma Win   [id number]   [facility]605[place]   01/27/2023  1   11/21/2022  Pregabalin 200 MG Capsule  90.00  30 Ma Win   [id number]   Wal (5605)   2/2  4.02 [place]   01/04/2023  1   01/03/2023  Concerta Er 54 MG Tablet  30.00  30 Ma Win   [id number]   [facility]5605[place]   [place]   12/26/2022  1   11/21/2022  Pregabalin 200 MG Capsule  90.00  30 Ma Win   [id number]   Wal (5605)   1/2  4.02 [place]

## 2023-03-20 DIAGNOSIS — I50.32 CHRONIC DIASTOLIC CHF (CONGESTIVE HEART FAILURE), NYHA CLASS 2 (H): ICD-10-CM

## 2023-03-20 DIAGNOSIS — G47.00 PERSISTENT INSOMNIA: ICD-10-CM

## 2023-03-20 RX ORDER — TRAZODONE HYDROCHLORIDE 100 MG/1
TABLET ORAL
Qty: 90 TABLET | Refills: 1 | Status: SHIPPED | OUTPATIENT
Start: 2023-03-20 | End: 2023-09-18

## 2023-03-20 RX ORDER — CARVEDILOL 25 MG/1
TABLET ORAL
Qty: 90 TABLET | Refills: 2 | Status: SHIPPED | OUTPATIENT
Start: 2023-03-20 | End: 2023-12-15

## 2023-03-20 NOTE — TELEPHONE ENCOUNTER
"Carvedilol  Last Written Prescription Date:  3/29/22  Last Fill Quantity: 90,  # refills: 3     Trazodone  Last Written Prescription Date:  524/22  Last Fill Quantity: 90,  # refills: 3     Last office visit provider:  10/4/22     Requested Prescriptions   Pending Prescriptions Disp Refills     carvedilol (COREG) 25 MG tablet [Pharmacy Med Name: CARVEDILOL 25MG TABLETS] 90 tablet 3     Sig: TAKE 1 TABLET(25 MG) BY MOUTH DAILY       Beta-Blockers Protocol Passed - 3/20/2023  6:12 AM        Passed - Blood pressure under 140/90 in past 12 months     BP Readings from Last 3 Encounters:   01/24/23 112/69   03/29/22 128/66   11/11/21 128/80                 Passed - Patient is age 6 or older        Passed - Recent (12 mo) or future (30 days) visit within the authorizing provider's specialty     Patient has had an office visit with the authorizing provider or a provider within the authorizing providers department within the previous 12 mos or has a future within next 30 days. See \"Patient Info\" tab in inbasket, or \"Choose Columns\" in Meds & Orders section of the refill encounter.              Passed - Medication is active on med list           traZODone (DESYREL) 100 MG tablet [Pharmacy Med Name: TRAZODONE 100MG TABLETS] 90 tablet 3     Sig: TAKE 1 TABLET BY MOUTH AT BEDTIME       Serotonin Modulators Passed - 3/20/2023  6:12 AM        Passed - Recent (12 mo) or future (30 days) visit within the authorizing provider's specialty     Patient has had an office visit with the authorizing provider or a provider within the authorizing providers department within the previous 12 mos or has a future within next 30 days. See \"Patient Info\" tab in inbasket, or \"Choose Columns\" in Meds & Orders section of the refill encounter.              Passed - Medication is active on med list        Passed - Patient is age 18 or older        Passed - No active pregnancy on record        Passed - No positive pregnancy test in past 12 months       "       KIRSTIN FRANKLIN, ADAMA 03/20/23 2:20 PM

## 2023-03-29 ENCOUNTER — MYC REFILL (OUTPATIENT)
Dept: FAMILY MEDICINE | Facility: CLINIC | Age: 68
End: 2023-03-29
Payer: COMMERCIAL

## 2023-03-29 DIAGNOSIS — F90.0 ATTENTION DEFICIT HYPERACTIVITY DISORDER (ADHD), PREDOMINANTLY INATTENTIVE TYPE: ICD-10-CM

## 2023-03-31 RX ORDER — METHYLPHENIDATE HYDROCHLORIDE 54 MG/1
54 TABLET, EXTENDED RELEASE ORAL EVERY MORNING
Qty: 30 TABLET | Refills: 0 | Status: SHIPPED | OUTPATIENT
Start: 2023-04-01 | End: 2023-04-27

## 2023-03-31 NOTE — TELEPHONE ENCOUNTER
Diagnosis: 1) ADHD 2) insomnia 3) chronic pain  Medication : 1) Methylphenidate 54 mg CR 2) zolpidem 10 mg 3 pregabalin 200 mg tid  Quantity per month: 1) 30) 30 3) 90  Number of refills before follow up visit: 6 months - may be by lissett SALAMANCA 3/29/22  Urine drug screen 11/1/22     Last visit 10/4/22    DUE FOR ANNUAL WELLNESS VISIT -I SEE SHE IS SCHEDULED    03/22/2023  1   02/23/2023  Pregabalin 200 MG Capsule  90.00  30 Ma Win   [id number]   [facility] (5605)   1/1  4.02 [place]   03/04/2023  1   03/02/2023  Concerta Er 54 MG Tablet  30.00  30 Ma Win   [id number]   [facility] (5605[place]   [place]   02/27/2023  1   02/23/2023  Pregabalin 200 MG Capsule  90.00  30 Ma Win   [id number]   Wal (5605)   0/1  4.02 [place]

## 2023-04-18 DIAGNOSIS — M54.12 CERVICAL NEURALGIA: ICD-10-CM

## 2023-04-18 RX ORDER — PREGABALIN 200 MG/1
CAPSULE ORAL
Qty: 90 CAPSULE | Refills: 0 | Status: SHIPPED | OUTPATIENT
Start: 2023-04-21 | End: 2023-04-27

## 2023-04-18 NOTE — TELEPHONE ENCOUNTER
Diagnosis: 1) ADHD 2) insomnia 3) chronic pain  Medication : 1) Methylphenidate 54 mg CR 2) zolpidem 10 mg 3 pregabalin 200 mg tid  Quantity per month: 1) 30) 30 3) 90  Number of refills before follow up visit: 6 months - may be by lissett SALAMANCA 3/29/22  Urine drug screen 11/1/22    Pregabalin (LYRICA) 200 MG capsule 90 capsule 1 2/23/2023  No   Sig: TAKE 1 CAPSULE BY MOUTH THREE TIMES DAILY   Sent to pharmacy as: Pregabalin 200 MG Oral Capsule (LYRICA)   Class: E-Prescribe   Order: [id number]   E-Prescribing Status: Receipt confirmed by pharmacy (2/23/2023  9:48 AM CST)     04/03/2023  1   03/31/2023  Concerta Er 54 MG Tablet  30.00  30 Ma Win   [id number]   [facility] (5605[place]   [place]   03/22/2023  1   02/23/2023  Pregabalin 200 MG Capsule  90.00  30 Ma Win   [id number]   Wal (5605)   1/1  4.02 [place]   03/04/2023  1   03/02/2023  Concerta Er 54 MG Tablet  30.00  30 Ma Win   [id number]   [facility] (5605[place]   [place]   02/27/2023  1   02/23/2023  Pregabalin 200 MG Capsule  90.00  30 Ma Win   [id number]   Wal (5605)   0/1  4.02 L[place]     Has upcoming appointment with me

## 2023-04-20 ENCOUNTER — PATIENT OUTREACH (OUTPATIENT)
Dept: CARE COORDINATION | Facility: CLINIC | Age: 68
End: 2023-04-20
Payer: COMMERCIAL

## 2023-04-27 ENCOUNTER — VIRTUAL VISIT (OUTPATIENT)
Dept: FAMILY MEDICINE | Facility: CLINIC | Age: 68
End: 2023-04-27
Payer: COMMERCIAL

## 2023-04-27 DIAGNOSIS — F90.0 ATTENTION DEFICIT HYPERACTIVITY DISORDER (ADHD), PREDOMINANTLY INATTENTIVE TYPE: Primary | ICD-10-CM

## 2023-04-27 DIAGNOSIS — Z12.31 VISIT FOR SCREENING MAMMOGRAM: ICD-10-CM

## 2023-04-27 DIAGNOSIS — M54.12 CERVICAL NEURALGIA: ICD-10-CM

## 2023-04-27 PROCEDURE — 99213 OFFICE O/P EST LOW 20 MIN: CPT | Mod: VID | Performed by: FAMILY MEDICINE

## 2023-04-27 RX ORDER — METHYLPHENIDATE HYDROCHLORIDE 54 MG/1
54 TABLET, EXTENDED RELEASE ORAL EVERY MORNING
Qty: 30 TABLET | Refills: 0 | Status: SHIPPED | OUTPATIENT
Start: 2023-05-01 | End: 2023-05-31

## 2023-04-27 RX ORDER — PREGABALIN 200 MG/1
200 CAPSULE ORAL 3 TIMES DAILY
Qty: 90 CAPSULE | Refills: 0 | Status: SHIPPED | OUTPATIENT
Start: 2023-04-19 | End: 2023-06-14

## 2023-04-27 NOTE — LETTER
[facility]  04/27/23  Patient: Cindy Beltrán  YOB: 1955  Medical Record Number: [id number]                                                                                  Non-Opioid Controlled Substance Agreement    This is an agreement between you and your provider regarding safe and appropriate use of controlled substances prescribed by your care team. Controlled substances are?medicines that can cause physical and mental dependence (abuse).     There are strict laws about having and using these medicines. We here at [facility] are  committed to working with you in your efforts to get better. To support you in this work, we'll help you schedule regular office appointments for medicine refills. If we must cancel or change your appointment for any reason, we'll make sure you have enough medicine to last until your next appointment.     As a Provider, I will:   Listen carefully to your concerns while treating you with respect.   Recommend a treatment plan that I believe is in your best interest and may involve therapies other than medicine.    Talk with you often about the possible benefits and the risk of harm of any medicine that we prescribe for you.  Assess the safety of this medicine and check how well it works.    Provide a plan on how to taper (discontinue or go off) using this medicine if the decision is made to stop its use.      ::  As a Patient, I understand controlled substances:     Are prescribed by my care provider to help me function or work and manage my condition(s).?  Are strong medicines and can cause serious side effects.     Need to be taken exactly as prescribed.?Combining controlled substances with certain medicines or chemicals (such as illegal drugs, alcohol, sedatives, sleeping pills, and benzodiazepines) can be dangerous or even fatal.? If I stop taking my medicines suddenly, I may have severe withdrawal symptoms.     Diagnosis: 1) ADHD 2)  insomnia 3) chronic pain  Medication : 1) Methylphenidate 54 mg CR 2) zolpidem 10 mg 3 pregabalin 200 mg tid  Quantity per month: 1) 30) 30  for 3 months 3) 90  Number of refills before follow up visit: 6 months - may be by lissett    The risks, benefits, and side effects of these medicine(s) were explained to me. I agree that:    I will take part in other treatments as advised by my care team. This may be psychiatry or counseling, physical therapy, behavioral therapy, group treatment or a referral to specialist.  I will keep all my appointments and understand this is part of the monitoring of controlled substances.?My care team may require an office visit for EVERY controlled substance refill. If I miss appointments or don t follow instructions, my care team may stop my medicine  I will take my medicines as prescribed. I will not change the dose or schedule unless my care team tells me to. There will be no refills if I run out early.      I may be asked to come to the clinic and complete a urine drug test or complete a pill count. If I don t give a urine sample or participate in a pill count, the care team may stop my medicine.  I will only receive controlled substance prescriptions from this clinic. If I am treated by another provider, I will tell them that I am taking controlled substances and that I have a treatment agreement with this provider. I will inform my [facility] care team within one business day if I am given a prescription for any controlled substance by another healthcare provider. My [facility] care team can contact other providers and pharmacists about my use of any medicines.  It is up to me to make sure that I don't run out of my medicines on weekends or holidays.?If my care team is willing to refill my prescription without a visit, I must request refills only during office hours. Refills may take up to 3 business days to process. I will use one pharmacy to fill all my controlled  substance prescriptions. I will notify the clinic about any changes to my insurance or medicine availability.  I am responsible for my prescriptions. If the medicine/prescription is lost, stolen or destroyed, it will not be replaced.?I also agree not to share controlled substance medicines with anyone.   I am aware I should not use any illegal or recreational drugs. I agree not to drink alcohol unless my care team says I can.   If I enroll in the [place] Medical Cannabis program, I will tell my care team before my next refill.  I will tell my care team right away if I become pregnant, have a new medical problem treated outside of my regular clinic, or have a change in my medicines.   I understand that this medicine can affect my thinking, judgment and reaction time.? Alcohol and drugs affect the brain and body, which can affect the safety of my driving. Being under the influence of alcohol or drugs can affect my decision-making, behaviors, personal safety and the safety of others. Driving while impaired (DWI) can occur if a person is driving, operating or in physical control of a car, motorcycle, boat, snowmobile, ATV, motorbike, off-road vehicle or any other motor vehicle ([place] Statute 169A.20). I understand the risk if I choose to drive or operate any vehicle or machinery.    I understand that if I do not follow any of the conditions above, my prescriptions or treatment may be stopped or changed.   I agree that my provider, clinic care team and pharmacy may work with any city, state or federal law enforcement agency that investigates the misuse, sale or other diversion of my controlled medicine. I will allow my provider to discuss my care with, or share a copy of, this agreement with any other treating provider, pharmacy or emergency room where I receive care.     I have read this agreement and have asked questions about anything I did not  understand.    ________________________________________________________  Patient Signature - Cindy M Jerel     ___________________                   Date     ________________________________________________________  Provider Signature - Roopa Westlake, MD       ___________________                   Date     ________________________________________________________  Witness Signature (required if provider not present while patient signing)          ___________________                   Date

## 2023-04-27 NOTE — PROGRESS NOTES
"Remedios is a 68 year old who is being evaluated via a billable video visit.      How would you like to obtain your AVS? Naya  If the video visit is dropped, the invitation should be resent by: Text to cell phone: [phone]  Will anyone else be joining your video visit? No        Assessment & Plan     STABLE  Attention deficit hyperactivity disorder (ADHD), predominantly inattentive type  - CONCERTA 54 MG CR tablet  Dispense: 30 tablet; Refill: 0    Cervical neuralgia  - Pregabalin (LYRICA) 200 MG capsule  Dispense: 90 capsule; Refill: 0    Visit for screening mammogram  - MA SCREENING DIGITAL BILAT - Future  (s+30)      Return in about 6 months (around 10/27/2023) for medicare annual wellness.      Avril Hernandez MD  [facility]   Remedios is a 68 year old, presenting for the following health issues:  Office Visit (Check-in and see how meds are working) and Recheck Medication    Diagnosis: 1) ADHD 2) insomnia 3) chronic pain  Medication : 1) Methylphenidate 54 mg CR 2) zolpidem 10 mg 3 pregabalin 200 mg tid  Quantity per month: 1) 30) 30 3) 90  Number of refills before follow up visit: 6 months - may be by naya    Insomnia  Remedios has been weaning herself off zolpidem - Hasn't taken any zolpidem since beginning of February. She finds she is not groggy in the morning anymore    Still takes trazodone. She would like to continue zolpidem as an occasional as-needed pill :agrees to change prescription to 10 pills monthly, or 30 pills for three months  Change to 30 for 3 months    ADHD   - \"My  cracks me up: 'you are so much more organized ' \"   - Today he continues to find Concerta helpful for being productive on a daily basis, even though she is no longer working    Chronic pain   - the things for her is that her leg really bother her - pregabalin helps so much with pain in legs - sometimes so unbelievable. At the worst, she has a crawling, painful sensation in the " muscles and bones -    - History of lumbar decompression surgery and laminectomy     Will be in [place] May 20th - June 10th 4/27/2023     4:25 PM   Additional Questions   Roomed by tad pereira   Accompanied by self         4/27/2023     4:25 PM   Patient Reported Additional Medications   Patient reports taking the following new medications no     History of Present Illness       Reason for visit:  Controlled substances check in    She eats 2-3 servings of fruits and vegetables daily.She consumes 0 sweetened beverage(s) daily.She exercises with enough effort to increase her heart rate 60 or more minutes per day.  She exercises with enough effort to increase her heart rate 6 days per week.   She is taking medications regularly.     Social History: her dog goes with her everywhere - he gets hemorrhagic gastroenteritis and has and unstable kneecap         Review of Systems   Overall Remedios feels great.  She walks 4 miles daily on treadmill      Objective           Vitals:  No vitals were obtained today due to virtual visit.    Physical Exam   GENERAL: Healthy, alert and no distress  EYES: Eyes grossly normal to inspection.  No discharge or erythema, or obvious scleral/conjunctival abnormalities.  RESP: No audible wheeze, cough, or visible cyanosis.  No visible retractions or increased work of breathing.    SKIN: Visible skin clear. No significant rash, abnormal pigmentation or lesions.  NEURO: Cranial nerves grossly intact.  Mentation and speech appropriate for age.  PSYCH: Mentation appears normal, affect normal/bright, judgement and insight intact, normal speech and appearance well-groomed.          Video-Visit Details    Type of service:  Video Visit : 4:59-5:19    Originating Location (pt. Location): Home  Distant Location (provider location):  Off-site  Platform used for Video Visit: Janel

## 2023-04-29 ENCOUNTER — HEALTH MAINTENANCE LETTER (OUTPATIENT)
Age: 68
End: 2023-04-29

## 2023-05-31 ENCOUNTER — MYC REFILL (OUTPATIENT)
Dept: FAMILY MEDICINE | Facility: CLINIC | Age: 68
End: 2023-05-31
Payer: COMMERCIAL

## 2023-05-31 DIAGNOSIS — F90.0 ATTENTION DEFICIT HYPERACTIVITY DISORDER (ADHD), PREDOMINANTLY INATTENTIVE TYPE: ICD-10-CM

## 2023-06-02 RX ORDER — METHYLPHENIDATE HYDROCHLORIDE 54 MG/1
54 TABLET, EXTENDED RELEASE ORAL EVERY MORNING
Qty: 30 TABLET | Refills: 0 | Status: SHIPPED | OUTPATIENT
Start: 2023-06-02 | End: 2023-06-29

## 2023-06-02 NOTE — TELEPHONE ENCOUNTER
Diagnosis: 1) ADHD 2) insomnia 3) chronic pain  Medication : 1) Methylphenidate 54 mg CR 2) zolpidem 10 mg 3 pregabalin 200 mg tid  Quantity per month: 1) 30) 30 3) 90  Number of refills before follow up visit: 6 months - may be by lissett SALAMANCA 3/29/22  Urine drug screen 11/1/22    Last visit 4/27/23 05/19/2023 04/27/2023   1  Pregabalin 200 Mg Capsule 90.00  30  Ma Win  [id number]   Wal (5605)  0/0  4.02 L[place]    05/01/2023 04/27/2023   1  Concerta Er 54 Mg Tablet 30.00  30  Ma Win  [id number]   Wal (5605)  [place]    04/21/2023 04/18/2023   1  Pregabalin 200 Mg Capsule 90.00  30  Ma Win  [id number]   Wal (5605)  0/0  4.02 L[place]    04/03/2023 03/31/2023   1  Concerta Er 54 Mg Tablet 30.00  30  Ma Win  [id number]   Wal (5605)  [place]

## 2023-06-13 DIAGNOSIS — M54.12 CERVICAL NEURALGIA: ICD-10-CM

## 2023-06-14 RX ORDER — PREGABALIN 200 MG/1
CAPSULE ORAL
Qty: 90 CAPSULE | Refills: 3 | Status: SHIPPED | OUTPATIENT
Start: 2023-06-18 | End: 2023-10-20

## 2023-06-14 NOTE — TELEPHONE ENCOUNTER
Diagnosis: 1) ADHD 2) insomnia 3) chronic pain  Medication : 1) Methylphenidate 54 mg CR 2) zolpidem 10 mg 3 pregabalin 200 mg tid  Quantity per month: 1) 30) 30 3) 90  Number of refills before follow up visit: 6 months - may be by lissett SALAMANCA 3/29/22  Urine drug screen 11/1/22    Last visit: 4/27/23 05/19/2023 04/27/2023   1  Pregabalin 200 Mg Capsule 90.00  30  Ma Win  [id number]   [facility] ([id number]  0/0  4.02 [place]    04/21/2023 04/18/2023   1  Pregabalin 200 Mg Capsule 90.00  30  Ma Win  [id number]   [facility] ([facility]605[facility]  0/[place]  4.02 [place]    03/22/2023 02/23/2023   1  Pregabalin 200 Mg Capsule 90.00  30  Ma Win  [id number]   [facility] ([facility]605[facility]  1/1  4.02 [place]

## 2023-06-27 ENCOUNTER — MYC MEDICAL ADVICE (OUTPATIENT)
Dept: FAMILY MEDICINE | Facility: CLINIC | Age: 68
End: 2023-06-27
Payer: COMMERCIAL

## 2023-06-27 ENCOUNTER — MYC REFILL (OUTPATIENT)
Dept: FAMILY MEDICINE | Facility: CLINIC | Age: 68
End: 2023-06-27
Payer: COMMERCIAL

## 2023-06-27 DIAGNOSIS — F90.0 ATTENTION DEFICIT HYPERACTIVITY DISORDER (ADHD), PREDOMINANTLY INATTENTIVE TYPE: ICD-10-CM

## 2023-06-27 RX ORDER — METHYLPHENIDATE HYDROCHLORIDE 54 MG/1
54 TABLET, EXTENDED RELEASE ORAL EVERY MORNING
Qty: 30 TABLET | Refills: 0 | OUTPATIENT
Start: 2023-06-27

## 2023-06-27 NOTE — TELEPHONE ENCOUNTER
Declined - she should have refills on this      Diagnosis: 1) ADHD 2) insomnia 3) chronic pain  Medication : 1) Methylphenidate 54 mg CR 2) zolpidem 10 mg 3 pregabalin 200 mg tid  Quantity per month: 1) 30) 30 3) 90  Number of refills before follow up visit: 6 months - may be by lissett SALAMANCA 3/29/22  Urine drug screen 11/1/22    Pregabalin (LYRICA) 200 MG capsule 90 capsule 3 6/18/2023  No   Sig: TAKE 1 CAPSULE(200 MG) BY MOUTH THREE TIMES DAILY   Sent to pharmacy as: Pregabalin 200 MG Oral Capsule (LYRICA)   Class: E-Prescribe   Order: [id number]   E-Prescribing Status: Receipt confirmed by pharmacy (6/14/2023 11:29 AM CDT)     06/15/2023  06/14/2023   1  Pregabalin 200 Mg Capsule 90.00  30  Ma Win  [id number]   [facility] ([id number])  0/3  4.02 [place]

## 2023-06-28 RX ORDER — METHYLPHENIDATE HYDROCHLORIDE 54 MG/1
54 TABLET, EXTENDED RELEASE ORAL EVERY MORNING
Qty: 30 TABLET | Refills: 0 | Status: CANCELLED | OUTPATIENT
Start: 2023-06-28

## 2023-06-29 RX ORDER — METHYLPHENIDATE HYDROCHLORIDE 54 MG/1
54 TABLET ORAL DAILY
Qty: 30 TABLET | Refills: 0 | Status: SHIPPED | OUTPATIENT
Start: 2023-08-01 | End: 2023-08-31

## 2023-06-29 RX ORDER — METHYLPHENIDATE HYDROCHLORIDE 54 MG/1
54 TABLET ORAL DAILY
Qty: 30 TABLET | Refills: 0 | Status: SHIPPED | OUTPATIENT
Start: 2023-07-02 | End: 2023-08-01

## 2023-06-29 RX ORDER — METHYLPHENIDATE HYDROCHLORIDE 54 MG/1
54 TABLET ORAL DAILY
Qty: 30 TABLET | Refills: 0 | Status: SHIPPED | OUTPATIENT
Start: 2023-08-31 | End: 2023-09-30

## 2023-06-29 NOTE — TELEPHONE ENCOUNTER
My error - got Concerta and Lyrica mixed up    06/02/2023 06/02/2023   1  Concerta Er 54 Mg Tablet 30.00  30  Ma Win  [id number]   [facility]606[facility]  [place]    05/01/2023 04/27/2023   1  Concerta Er 54 Mg Tablet 30.00  30  Ma Win  [id number]   [facility] ([facility]602[place]  [place]    04/03/2023 03/31/2023   1  Concerta Er 54 Mg Tablet 30.00  30  Ma Win  [id number]   [facility]  [place]

## 2023-09-16 DIAGNOSIS — G47.00 PERSISTENT INSOMNIA: ICD-10-CM

## 2023-09-18 RX ORDER — TRAZODONE HYDROCHLORIDE 100 MG/1
TABLET ORAL
Qty: 90 TABLET | Refills: 1 | Status: SHIPPED | OUTPATIENT
Start: 2023-09-18 | End: 2024-03-14

## 2023-09-27 ENCOUNTER — MYC REFILL (OUTPATIENT)
Dept: FAMILY MEDICINE | Facility: CLINIC | Age: 68
End: 2023-09-27
Payer: COMMERCIAL

## 2023-09-27 DIAGNOSIS — F90.0 ATTENTION DEFICIT HYPERACTIVITY DISORDER (ADHD), PREDOMINANTLY INATTENTIVE TYPE: ICD-10-CM

## 2023-09-27 RX ORDER — METHYLPHENIDATE HYDROCHLORIDE 54 MG/1
54 TABLET, EXTENDED RELEASE ORAL EVERY MORNING
Qty: 30 TABLET | Refills: 0 | Status: SHIPPED | OUTPATIENT
Start: 2023-09-28 | End: 2023-11-26

## 2023-09-28 NOTE — TELEPHONE ENCOUNTER
Diagnosis: 1) ADHD 2) insomnia 3) chronic pain  Medication : 1) Methylphenidate 54 mg CR 2) zolpidem 10 mg 3 pregabalin 200 mg tid  Quantity per month: 1) 30) 30 3) 90  Number of refills before follow up visit: 6 months - may be by lissett SALAMANCA 3/29/22  Urine drug screen 11/1/22     Last visit 4/27/23    Next visit scheduled 10/6/23    08/31/2023 06/29/2023 1 Concerta Er 54 Mg Tablet 30.00 30 [facility] [id number] [id number]   08/01/2023 06/29/2023 1 Concerta Er 54 Mg Tablet 30.00 30 Ma [facility] [id number] [facility]   06/30/2023 06/29/2023 1 Concerta Er 54 Mg Tablet 30.00 30 Ma Win [id number] [facility] (5605) 0/0

## 2023-09-29 ENCOUNTER — ANCILLARY PROCEDURE (OUTPATIENT)
Dept: MAMMOGRAPHY | Facility: CLINIC | Age: 68
End: 2023-09-29
Attending: FAMILY MEDICINE
Payer: COMMERCIAL

## 2023-09-29 DIAGNOSIS — Z12.31 VISIT FOR SCREENING MAMMOGRAM: ICD-10-CM

## 2023-09-29 PROCEDURE — 77067 SCR MAMMO BI INCL CAD: CPT | Mod: TC | Performed by: RADIOLOGY

## 2023-09-29 PROCEDURE — 77063 BREAST TOMOSYNTHESIS BI: CPT | Mod: TC | Performed by: RADIOLOGY

## 2023-10-06 ENCOUNTER — PATIENT OUTREACH (OUTPATIENT)
Dept: ONCOLOGY | Facility: CLINIC | Age: 68
End: 2023-10-06

## 2023-10-06 ENCOUNTER — OFFICE VISIT (OUTPATIENT)
Dept: FAMILY MEDICINE | Facility: CLINIC | Age: 68
End: 2023-10-06
Payer: COMMERCIAL

## 2023-10-06 VITALS
TEMPERATURE: 97.1 F | DIASTOLIC BLOOD PRESSURE: 72 MMHG | WEIGHT: 110.6 LBS | HEART RATE: 74 BPM | RESPIRATION RATE: 15 BRPM | OXYGEN SATURATION: 98 % | SYSTOLIC BLOOD PRESSURE: 142 MMHG | HEIGHT: 64 IN | BODY MASS INDEX: 18.88 KG/M2

## 2023-10-06 DIAGNOSIS — Z80.3 FAMILY HISTORY OF MALIGNANT NEOPLASM OF BREAST: ICD-10-CM

## 2023-10-06 DIAGNOSIS — E78.2 MIXED HYPERLIPIDEMIA: ICD-10-CM

## 2023-10-06 DIAGNOSIS — H25.9 AGE-RELATED CATARACT OF BOTH EYES, UNSPECIFIED AGE-RELATED CATARACT TYPE: ICD-10-CM

## 2023-10-06 DIAGNOSIS — Z01.818 PREOP GENERAL PHYSICAL EXAM: Primary | ICD-10-CM

## 2023-10-06 DIAGNOSIS — I50.32 CHRONIC DIASTOLIC CHF (CONGESTIVE HEART FAILURE), NYHA CLASS 2 (H): ICD-10-CM

## 2023-10-06 PROCEDURE — 99214 OFFICE O/P EST MOD 30 MIN: CPT | Performed by: FAMILY MEDICINE

## 2023-10-06 RX ORDER — AMOXICILLIN 500 MG/1
TABLET, FILM COATED ORAL
COMMUNITY
Start: 2023-10-04 | End: 2024-06-05

## 2023-10-06 RX ORDER — ATORVASTATIN CALCIUM 20 MG/1
20 TABLET, FILM COATED ORAL DAILY
Qty: 90 TABLET | Refills: 3 | Status: SHIPPED | OUTPATIENT
Start: 2023-10-06

## 2023-10-06 ASSESSMENT — PAIN SCALES - GENERAL: PAINLEVEL: MILD PAIN (2)

## 2023-10-06 NOTE — PROGRESS NOTES
Writer received Cancer Risk Management Program referral, referred for:     sister and two maternal aunts with breast cancer (mother did not have breast cancer - mother was late 80s at time of death)      Reviewed for appropriate plan, and sent to New Patient Scheduling for completion.

## 2023-10-06 NOTE — PROGRESS NOTES
"[facility]  [address] [place] [address]  Phone: [phone]  Fax: [phone]  Primary Provider: Clint Carrillo  Pre-op Performing Provider: CLINT CARRILLO      PREOPERATIVE EVALUATION:  Today's date: 10/6/2023    Remedios is a 68 year old female who presents for a preoperative evaluation.      10/6/2023     9:27 AM   Additional Questions   Roomed by Shayna       Surgical Information:  Surgery/Procedure: Cataracts (Right and Left)  Surgery Location: [facility]  Surgeon: Dr. Cuenca  Surgery Date: 11/08/23  Time of Surgery: BD  Where patient plans to recover: At home with family  Fax number for surgical facility: [phone]    Assessment & Plan     The proposed surgical procedure is considered LOW risk.    Preop general physical exam      Age-related cataract of both eyes, unspecified age-related cataract type  Reason for surgery    Mixed hyperlipidemia  Evaluate with   - Lipid panel reflex to direct LDL Fasting  Refill  - atorvastatin (LIPITOR) 20 MG tablet  Dispense: 90 tablet; Refill: 3    Chronic diastolic CHF (congestive heart failure), NYHA class 2 (H)  Not symptomatic.  Standard monitoring with  - CBC with Platelets  - Comprehensive metabolic panel (BMP + Alb, Alk Phos, ALT, AST, Total. Bili, TP)    Family history of malignant neoplasm of breast  - Adult Genetics & Metabolism Referral        Antiplatelet or Anticoagulation Medication Instructions:   - Patient is on no antiplatelet or anticoagulation medications.    Additional Medication Instructions:  Patient is to take all scheduled medications on the day of surgery    RECOMMENDATION:  APPROVAL GIVEN to proceed with proposed procedure, without further diagnostic evaluation.            Subjective       HPI related to upcoming procedure:  Remedios went 4 years ago  for eye visit where she was told she had cataract, and she kept putting surgery off. Finally this year her ophthalmologist said \"you need to do " this. She is having a hard time seeing both distance and up close    [place]        10/1/2023     3:48 PM   Preop Questions   1. Have you ever had a heart attack or stroke? No   2. Have you ever had surgery on your heart or blood vessels, such as a stent placement, a coronary artery bypass, or surgery on an artery in your head, neck, heart, or legs? No   3. Do you have chest pain with activity? No   4. Do you have a history of  heart failure? No   5. Do you currently have a cold, bronchitis or symptoms of other infection? No   6. Do you have a cough, shortness of breath, or wheezing? No   7. Do you or anyone in your family have previous history of blood clots? No   8. Do you or does anyone in your family have a serious bleeding problem such as prolonged bleeding following surgeries or cuts? No   9. Have you ever had problems with anemia or been told to take iron pills? No   10. Have you had any abnormal blood loss such as black, tarry or bloody stools, or abnormal vaginal bleeding? No   11. Have you ever had a blood transfusion? No   12. Are you willing to have a blood transfusion if it is medically needed before, during, or after your surgery? Yes   13. Have you or any of your relatives ever had problems with anesthesia? No   14. Do you have sleep apnea, excessive snoring or daytime drowsiness? No   15. Do you have any artifical heart valves or other implanted medical devices like a pacemaker, defibrillator, or continuous glucose monitor? No   16. Do you have artificial joints? No   17. Are you allergic to latex? No       Health Care Directive:  Patient does not have a Health Care Directive or Living Will: Discussed advance care planning with patient; information given to patient to review.        Preoperative Review of :   reviewed - controlled substances reflected in medication list.      Status of Chronic Conditions:  See problem list for active medical problems.   Problems all longstanding and stable, except as noted/documented.  See ROS for pertinent symptoms related to these conditions.    Review of Systems  Constitutional: negative for recent illness or change in weight  Ears, nose, mouth, throat, and face: negative for nasal congestion and sore throat  Respiratory: negative for cough and dyspnea on exertion  Cardiovascular: negative for chest pain and palpitations  Gastrointestinal: negative for abdominal pain and change in bowel habits  Genitourinary:negative for dysuria, frequency and hesitancy  Integument/breast: negative for rash  Hematologic/lymphatic: negative for bleeding and easy bruising  Musculoskeletal:negative for arthralgias and myalgias  Neurological: negative for dizziness, headaches and paresthesia        Patient Active Problem List    Diagnosis Date Noted    Age-related osteoporosis without current pathological fracture 12/15/2022     Priority: Medium    Controlled substance agreement signed 02/04/2022     Priority: Medium     Diagnosis: 1) ADHD 2) insomnia 3) chronic pain  Medication : 1) Methylphenidate 54 mg CR 2) zolpidem 10 mg 3 pregabalin 200 mg tid  Quantity per month: 1) 30) 30 3) 90  Number of refills before follow up visit: 6 months - may be by lissett SALAMANCA 3/29/22  Urine drug screen 11/1/22      Mixed hyperlipidemia 12/04/2017     Priority: Medium    Coronary atherosclerosis due to calcified coronary lesion 12/04/2017     Priority: Medium    Chronic diastolic CHF (congestive heart failure), NYHA class 2 (H) 12/04/2017     Priority: Medium    Left carotid stenosis 07/13/2017     Priority: Medium     Mild, no symptoms, - treated with lipitor        Primary insomnia 07/13/2017     Priority: Medium    Chronic midline low back pain 06/22/2017     Priority: Medium     History of surgery L4-L5 vertebrae, lyrica helps with symptoms         Idiopathic cardiomyopathy (H) 06/22/2017     Priority: Medium    ADD (attention deficit disorder) 09/14/2016      "Priority: Medium    Mitral regurgitation 09/14/2016     Priority: Medium    Cervical neuralgia 05/01/2016     Priority: Medium     'used to it\" - feels [place] sometimes - no medications        Alcohol dependence in remission (H) 11/21/2014     Priority: Medium    Cervical cancer screening 04/20/2007     Priority: Medium     No further cervical cancer screening recommended.     Pt age 66  Pt needs 3 consecutive negative pap tests or 2 consecutive negative cotests within 10 years with the last one being in the last 5 years, before pap screening can be discontinued.    No history of JESSICA 2 or greater found in epic.   Pap history below.    4/20/07 LSIL  5/21/07 Colpo ECC neg  2007, 2009 NIL paps  6/17/16 NIL pap, neg HPV  3/2/21 Unsatisfactory pap, neg HPV. Plan: pap in 2-4 months, Mychart results read  6/2/21 Reminder mychart -- read  6/30/21 Reminder call -- left message  7/2/21 Per provider, pt plans office visit for September 9/23/21 Appt -- cancelled  10/14/21 Appt -- cancelled  11/11/21 Appt -- pap not done (declined per provider but was reminded to come back for pap)  12/9/21 Lost to follow-up for pap tracking   03/29/22 NIL Pap, Neg HR HPV Plan no further Pap's.         Past Medical History:   Diagnosis Date    Anxiety     Back pain     Cardiomyopathy (H)     stated in H&P as remote hx    Cardiomyopathy (H)     CHF (congestive heart failure) (H)     Constipation     Coronary atherosclerosis due to calcified coronary lesion 12/4/2017    Depression     Depression with anxiety     GI bleed 2010    per pt hx    Hyperlipidemia     Hypertension     Leg weakness, bilateral     mild    LGSIL on Pap smear of cervix 4/20/2007 4/20/07 LSIL 5/21/07 Colpo ECC neg 2007, 2009 NIL paps 6/17/16 NIL pap, neg HPV 3/2/21 Unsatisfactory pap, neg HPV. Plan: pap in 2-4 months    Osteoporosis     Osteoporosis     Other chronic pain     Back pain, known disc herniation L 4, L5     Past Surgical History:   Procedure Laterality " Date    BIOPSY BREAST Right     benign     COLPOSCOPY      DECOMPRESSION LUMBAR MINIMALLY INVASIVE ONE LEVEL Right 12/17/2014    Procedure: DECOMPRESSION LUMBAR MINIMALLY INVASIVE ONE LEVEL;  Surgeon: Tripp Clifton MD;  Location: [facility] OR    HIP ARTHROSCOPY W/ LABRAL REPAIR Right 2012    LAMINECTOMY AND MICRODISCECTOMY LUMBAR SPINE  12/17/2014    Procedure: Decpmpression Lumbar Minimally invasive once level; surgeon: Tripp Clifton MD; LOC: [facility] OR    ORTHOPEDIC SURGERY      Right hip arthroscopy    OTHER SURGICAL HISTORY  2004    OTHER SURGICAL HISTORYPr Biopsy/Excise Cervical Lesion; Polyps    OTHER SURGICAL HISTORY  03/2009    Xr Mammo Bilateral Diagnostic (Ia)    OTHER SURGICAL HISTORY  2012    dental implant    TUMOR REMOVAL  1987    Parotid Gland, Benign     Current Outpatient Medications   Medication Sig Dispense Refill    ACE/ARB/ARNI NOT PRESCRIBED (INTENTIONAL) Please choose reason not prescribed from choices below.      amoxicillin (AMOXIL) 500 MG tablet TAKE 1 TABLET BY MOUTH EVERY 8 HOURS UNTIL GONE      atorvastatin (LIPITOR) 20 MG tablet Take 1 tablet (20 mg) by mouth daily 90 tablet 3    carvedilol (COREG) 25 MG tablet TAKE 1 TABLET(25 MG) BY MOUTH DAILY 90 tablet 2    cholecalciferol 25 MCG (1000 UT) TABS Take 1,000 Units by mouth daily      CONCERTA 54 MG CR tablet Take 1 tablet (54 mg) by mouth every morning BRAND NAME CONCERTA as covered by insruance- not generic 30 tablet 0    Pregabalin (LYRICA) 200 MG capsule TAKE 1 CAPSULE(200 MG) BY MOUTH THREE TIMES DAILY 90 capsule 3    traZODone (DESYREL) 100 MG tablet TAKE 1 TABLET BY MOUTH AT BEDTIME 90 tablet 1    zolpidem (AMBIEN) 10 MG tablet TAKE 1 TABLET(10 MG) BY MOUTH AT BEDTIME 90 tablet 1    ASPIRIN NOT PRESCRIBED (INTENTIONAL) Please choose reason not prescribed from choices below. (Patient not taking: Reported on 10/6/2023)         No Known Allergies     Social History     Tobacco Use    Smoking status: Never     Passive exposure: Never  "   Smokeless tobacco: Never   Substance Use Topics    Alcohol use: No     Alcohol/week: 0.0 standard drinks of alcohol     Comment: Alcoholic Drinks/day: stop drinking in OCt 2004     Family History   Problem Relation Age of Onset    Alcoholism Mother     Heart Disease Mother     Hypertension Mother     Hyperlipidemia Mother     Heart Disease Father     Hypertension Father     Hyperlipidemia Father     Alcoholism Sister     Breast Cancer Sister 60        lumpecteomy    Alcoholism Brother         age 50    Substance Abuse Son     Breast Cancer Maternal Aunt 58        had it twice passed away 70    Breast Cancer Maternal Aunt 80    Colon Cancer Paternal Uncle      History   Drug Use No         Objective     BP (!) 142/72 (BP Location: Left arm, Patient Position: Sitting, Cuff Size: Adult Regular)   Pulse 74   Temp 97.1  F (36.2  C) (Tympanic)   Resp 15   Ht 1.626 m (5' 4.02\")   Wt 50.2 kg (110 lb 9.6 oz)   LMP  (LMP Unknown)   SpO2 98%   BMI 18.97 kg/m      Physical Exam  General appearance - alert, well appearing, and in no distress  Mental status - normal mood, behavior, speech, dress, motor activity, and thought processes  Eyes - pupils equal and reactive, extraocular eye movements intact, funduscopic exam normal, discs flat and sharp  Ears - bilateral TM's and external ear canals normal  Mouth - mucous membranes moist, pharynx normal without lesions  Neck - supple, no significant adenopathy, carotids upstroke normal bilaterally, no bruits, thyroid exam: thyroid is normal in size without nodules or tenderness  Chest - clear to auscultation, no wheezes, rales or rhonchi, symmetric air entry  Heart - normal rate, regular rhythm, normal S1, S2, no murmurs, rubs, clicks or gallops  Abdomen - soft, nontender, nondistended, no masses or organomegaly  Breasts - breasts appear normal, no suspicious masses, no skin or nipple changes or axillary nodes  Neurological - alert, oriented, normal speech, no focal findings " or movement disorder noted, DTR's normal and symmetric  Extremities - peripheral pulses normal, no pedal edema, no clubbing or cyanosis  Skin - no rashes or worrisome lesions      Recent Labs   Lab Test 01/24/23  1502 06/17/22  1423   HGB  --  13.2   PLT  --  207   NA  --  142   POTASSIUM  --  3.9   CR 0.67 0.70        Diagnostics:  No labs were ordered during this visit.   No EKG required for low risk surgery (cataract, skin procedure, breast biopsy, etc).    Revised Cardiac Risk Index (RCRI):  The patient has the following serious cardiovascular risks for perioperative complications:   - No serious cardiac risks = 0 points . NOTE: small amount of plaque seen in CT angiogram - never had a positive stress test    RCRI Interpretation: 0 points: Class I (very low risk - 0.4% complication rate)         Signed Electronically by: Avril Hernandez MD  Copy of this evaluation report is provided to requesting physician.

## 2023-10-07 NOTE — PROGRESS NOTES
SUBJECTIVE:     Remedios is a 68 year old who presents for Preop visit - I am adding on Medicare questions - she need not come in for Medicare Annual Wellness for a year from now          10/6/2023     9:27 AM   Additional Questions   Roomed by Shayna     Fall risk  Fallen 2 or more times in the past year?: No  Any fall with injury in the past year?: No    Cognitive Screening   1) Repeat 3 items (Leader, Season, Table)    2) Clock draw: NORMAL  3) 3 item recall: Recalls 3 objects  Results: 3 items recalled: COGNITIVE IMPAIRMENT LESS LIKELY    Mini-CogTM Copyright S Vidal. Licensed by the author for use in [facility]; reprinted with permission (soob@[facility]). All rights reserved.          Reviewed and updated as needed this visit by clinical staff   Tobacco  Allergies  Meds  Problems             Reviewed and updated as needed this visit by Provider     Meds  Problems            Social History     Tobacco Use     Smoking status: Never     Passive exposure: Never     Smokeless tobacco: Never   Substance Use Topics     Alcohol use: No     Alcohol/week: 0.0 standard drinks of alcohol     Comment: Alcoholic Drinks/day: stop drinking in OCt 2004             3/26/2022     8:00 PM   Alcohol Use   Prescreen: >3 drinks/day or >7 drinks/week? Not Applicable     Do you have a current opioid prescription? No  Do you use any other controlled substances or medications that are not prescribed by a provider? None        Current providers sharing in care for this patient include:   Patient Care Team:  Avril Hernandez MD as PCP - Te Garner MD as MD (Orthopedics)  Tripp Clifton MD as Referring Physician (Orthopedics)  Cherelle Graham, PharmD as Pharmacist (Pharmacist)  Avril Hernandez MD as Assigned PCP    The following health maintenance items are reviewed in Epic and correct as of today:  Health Maintenance   Topic Date Due     ZOSTER IMMUNIZATION (1 of 2) Never done     Pneumococcal  Vaccine: 65+ Years (2 - PCV) 11/11/2022     MEDICARE ANNUAL WELLNESS VISIT  03/29/2023     ANNUAL REVIEW OF HM ORDERS  03/29/2023     ALT  06/17/2023     BMP  06/17/2023     CBC  06/17/2023     ADVANCE CARE PLANNING  08/09/2023     INFLUENZA VACCINE (1) 09/01/2023     COVID-19 Vaccine (5 - 2023-24 season) 09/01/2023     LIPID  12/13/2023     FALL RISK ASSESSMENT  04/27/2024     MAMMO SCREENING  09/29/2024     HF ACTION PLAN  11/14/2024     DTAP/TDAP/TD IMMUNIZATION (3 - Td or Tdap) 06/17/2026     COLORECTAL CANCER SCREENING  01/16/2030     DEXA  06/10/2037     TSH W/FREE T4 REFLEX  Completed     HEPATITIS C SCREENING  Completed     PHQ-2 (once per calendar year)  Completed     PAP FOLLOW-UP  Completed     IPV IMMUNIZATION  Aged Out     HPV IMMUNIZATION  Aged Out     MENINGITIS IMMUNIZATION  Aged Out     PAP  Discontinued           FHS-7:       12/21/2021     4:29 PM 3/26/2022     8:02 PM 9/29/2023     1:48 PM 10/1/2023     3:50 PM   Breast CA Risk Assessment (FHS-7)   Did any of your first-degree relatives have breast or ovarian cancer? No Yes Yes Yes   Did any of your relatives have bilateral breast cancer? No Unknown No Unknown   Did any man in your family have breast cancer? No No No No   Did any woman in your family have breast and ovarian cancer? No No No Unknown   Did any woman in your family have breast cancer before age 50 y? No No No No   Do you have 2 or more relatives with breast and/or ovarian cancer? No Yes No Yes   Do you have 2 or more relatives with breast and/or bowel cancer? No Unknown Yes Yes     Based on above, referral is made to Genetics    Pertinent mammograms are reviewed under the imaging tab.  COUNSELING:  Reviewed preventive health counseling, as reflected in patient instructions        She reports that she has never smoked. She has never been exposed to tobacco smoke. She has never used smokeless tobacco.      Appropriate preventive services were discussed with this patient, including  applicable screening as appropriate for fall prevention, nutrition, physical activity, Tobacco-use cessation, weight loss and cognition.  Checklist reviewing preventive services available has been given to the patient.    Reviewed patients plan of care and provided an AVS. The Basic Care Plan (routine screening as documented in Health Maintenance) for Cindy meets the Care Plan requirement. This Care Plan has been established and reviewed with the Patient.      Avril Hernandez MD  [facility] [place]    Identified Health Risks:  I have reviewed Opioid Use Disorder and Substance Use Disorder risk factors and made any needed referrals.

## 2023-10-19 DIAGNOSIS — Z79.899 CONTROLLED SUBSTANCE AGREEMENT SIGNED: Primary | ICD-10-CM

## 2023-10-19 DIAGNOSIS — M54.12 CERVICAL NEURALGIA: ICD-10-CM

## 2023-10-20 RX ORDER — PREGABALIN 200 MG/1
200 CAPSULE ORAL 3 TIMES DAILY
Qty: 90 CAPSULE | Refills: 2 | Status: SHIPPED | OUTPATIENT
Start: 2023-10-27 | End: 2024-01-23

## 2023-10-20 NOTE — TELEPHONE ENCOUNTER
Diagnosis: 1) ADHD 2) insomnia 3) chronic pain  Medication : 1) Methylphenidate 54 mg CR 2) zolpidem 10 mg 3 pregabalin 200 mg tid  Quantity per month: 1) 30) 30 3) 90  Number of refills before follow up visit: 6 months - may be by lissett SALAMANCA 5/22/23  Urine drug screen 11/1/22    Last visit 10/6/23    09/28/2023 06/14/2023 1 Pregabalin 200 Mg Capsule 90.00 30 Ma Win [id number] [facility] ([id number][facility] 3/3 4.02 [place]   08/31/2023 06/14/2023 1 Pregabalin 200 Mg Capsule 90.00 30 Ma Win [id number] [facility] ([facility]605[place] 2/3 4.[place]   07/30/2023 06/14/2023 1 Pregabalin 200 Mg Capsule 90.00 30 Ma Win [id number] [facility] (5605[facility] 1/3 4.02 [place]     Above doesn't jive with (says she picked up 3 of 3 refills - perahps only 3 months at a time is accepted)    Pregabalin (LYRICA) 200 MG capsule 90 capsule 3 6/18/2023  No   Sig: TAKE 1 CAPSULE(200 MG) BY MOUTH THREE TIMES DAILY   Sent to pharmacy as: Pregabalin 200 MG Oral Capsule (LYRICA)   Class: E-Prescribe   Order: [id number]   E-Prescribing Status: Receipt confirmed by pharmacy (6/14/2023 11:29 AM CDT)

## 2023-11-26 ENCOUNTER — MYC REFILL (OUTPATIENT)
Dept: FAMILY MEDICINE | Facility: CLINIC | Age: 68
End: 2023-11-26
Payer: COMMERCIAL

## 2023-11-26 DIAGNOSIS — Z79.899 CONTROLLED SUBSTANCE AGREEMENT SIGNED: Primary | ICD-10-CM

## 2023-11-26 DIAGNOSIS — F90.0 ATTENTION DEFICIT HYPERACTIVITY DISORDER (ADHD), PREDOMINANTLY INATTENTIVE TYPE: ICD-10-CM

## 2023-11-27 RX ORDER — METHYLPHENIDATE HYDROCHLORIDE 54 MG/1
54 TABLET, EXTENDED RELEASE ORAL EVERY MORNING
Qty: 30 TABLET | Refills: 0 | Status: SHIPPED | OUTPATIENT
Start: 2023-11-27 | End: 2023-12-26

## 2023-11-27 NOTE — TELEPHONE ENCOUNTER
Diagnosis: 1) ADHD 2) insomnia 3) chronic pain  Medication : 1) Methylphenidate 54 mg CR 2) zolpidem 10 mg 3 pregabalin 200 mg tid  Quantity per month: 1) 30) 30 3) 90  Number of refills before follow up visit: 6 months - may be by lissett SALAMANCA 5/22/23  Urine drug screen 12/13/22    Last visit: 10/6/23    11/23/2023 10/20/2023 1 Pregabalin 200 Mg Capsule 90.00 30 Ma Win [id number] [facility] ([id number][place] 0/1 [place]   10/28/2023 09/27/2023 1 Methylphenidate Er 54 Mg Tab 30.00 30 Ma [facility] [id number] [facility] ([id number][place]  [place]   10/21/2023 10/20/2023 1 Pregabalin 200 Mg Capsule 90.00 30 Ma Win [id number] [facility] ([id number][place] 0/2 4.[place]   09/28/2023 06/29/2023 1 Concerta Er 54 Mg Tablet 30.00 30 Ma Win [id number] [facility] ([facility]605[place]  [place]   09/28/2023 06/14/2023 1 Pregabalin 200 Mg Capsule 90.00 30 Ma Win [id number] [facility] (5605) 3/3

## 2023-12-07 ENCOUNTER — TELEPHONE (OUTPATIENT)
Dept: FAMILY MEDICINE | Facility: CLINIC | Age: 68
End: 2023-12-07
Payer: COMMERCIAL

## 2023-12-07 DIAGNOSIS — M81.0 AGE-RELATED OSTEOPOROSIS WITHOUT CURRENT PATHOLOGICAL FRACTURE: Primary | ICD-10-CM

## 2023-12-07 RX ORDER — ALBUTEROL SULFATE 0.83 MG/ML
2.5 SOLUTION RESPIRATORY (INHALATION)
Status: CANCELLED | OUTPATIENT
Start: 2023-12-07

## 2023-12-07 RX ORDER — MEPERIDINE HYDROCHLORIDE 25 MG/ML
25 INJECTION INTRAMUSCULAR; INTRAVENOUS; SUBCUTANEOUS EVERY 30 MIN PRN
Status: CANCELLED | OUTPATIENT
Start: 2023-12-07

## 2023-12-07 RX ORDER — ALBUTEROL SULFATE 90 UG/1
1-2 AEROSOL, METERED RESPIRATORY (INHALATION)
Status: CANCELLED
Start: 2023-12-07

## 2023-12-07 RX ORDER — HEPARIN SODIUM,PORCINE 10 UNIT/ML
5 VIAL (ML) INTRAVENOUS
Status: CANCELLED | OUTPATIENT
Start: 2023-12-07

## 2023-12-07 RX ORDER — DIPHENHYDRAMINE HYDROCHLORIDE 50 MG/ML
50 INJECTION INTRAMUSCULAR; INTRAVENOUS
Status: CANCELLED
Start: 2023-12-07

## 2023-12-07 RX ORDER — EPINEPHRINE 1 MG/ML
0.3 INJECTION, SOLUTION, CONCENTRATE INTRAVENOUS EVERY 5 MIN PRN
Status: CANCELLED | OUTPATIENT
Start: 2023-12-07

## 2023-12-07 RX ORDER — HEPARIN SODIUM (PORCINE) LOCK FLUSH IV SOLN 100 UNIT/ML 100 UNIT/ML
5 SOLUTION INTRAVENOUS
Status: CANCELLED | OUTPATIENT
Start: 2023-12-07

## 2023-12-07 RX ORDER — METHYLPREDNISOLONE SODIUM SUCCINATE 125 MG/2ML
125 INJECTION, POWDER, LYOPHILIZED, FOR SOLUTION INTRAMUSCULAR; INTRAVENOUS
Status: CANCELLED
Start: 2023-12-07

## 2023-12-07 NOTE — TELEPHONE ENCOUNTER
12/7 Spoke to patient and scheduled fasting lab for 12/12, gave phone numer to imaging to schedule her Dexa scan.

## 2023-12-07 NOTE — TELEPHONE ENCOUNTER
Please call Remedios,    She is due for a bone density test and labs.  I did sign her treatment plan for her next reclast infusion for osteoporosis, but before that she will need to have labs done - I ordered fasting labs when I saw her for a preop.  Please help her schedule fasting lab visit and give her the number to schedule bone density test.    Thank you

## 2023-12-15 DIAGNOSIS — I50.32 CHRONIC DIASTOLIC CHF (CONGESTIVE HEART FAILURE), NYHA CLASS 2 (H): ICD-10-CM

## 2023-12-15 RX ORDER — CARVEDILOL 25 MG/1
TABLET ORAL
Qty: 90 TABLET | Refills: 0 | Status: SHIPPED | OUTPATIENT
Start: 2023-12-15 | End: 2024-03-14

## 2023-12-20 DIAGNOSIS — M81.0 AGE-RELATED OSTEOPOROSIS WITHOUT CURRENT PATHOLOGICAL FRACTURE: Primary | ICD-10-CM

## 2023-12-20 RX ORDER — HEPARIN SODIUM (PORCINE) LOCK FLUSH IV SOLN 100 UNIT/ML 100 UNIT/ML
5 SOLUTION INTRAVENOUS
Status: DISCONTINUED | OUTPATIENT
Start: 2023-12-20 | End: 2023-12-21 | Stop reason: HOSPADM

## 2023-12-20 RX ORDER — MEPERIDINE HYDROCHLORIDE 25 MG/ML
25 INJECTION INTRAMUSCULAR; INTRAVENOUS; SUBCUTANEOUS EVERY 30 MIN PRN
Status: DISCONTINUED | OUTPATIENT
Start: 2023-12-20 | End: 2023-12-21 | Stop reason: HOSPADM

## 2023-12-20 RX ORDER — METHYLPREDNISOLONE SODIUM SUCCINATE 125 MG/2ML
125 INJECTION, POWDER, LYOPHILIZED, FOR SOLUTION INTRAMUSCULAR; INTRAVENOUS
Status: DISCONTINUED | OUTPATIENT
Start: 2023-12-20 | End: 2023-12-21 | Stop reason: HOSPADM

## 2023-12-20 RX ORDER — DIPHENHYDRAMINE HYDROCHLORIDE 50 MG/ML
50 INJECTION INTRAMUSCULAR; INTRAVENOUS
Status: DISCONTINUED | OUTPATIENT
Start: 2023-12-20 | End: 2023-12-21 | Stop reason: HOSPADM

## 2023-12-20 RX ORDER — EPINEPHRINE 1 MG/ML
0.3 INJECTION, SOLUTION, CONCENTRATE INTRAVENOUS EVERY 5 MIN PRN
Status: DISCONTINUED | OUTPATIENT
Start: 2023-12-20 | End: 2023-12-21 | Stop reason: HOSPADM

## 2023-12-20 RX ORDER — ALBUTEROL SULFATE 0.83 MG/ML
2.5 SOLUTION RESPIRATORY (INHALATION)
Status: DISCONTINUED | OUTPATIENT
Start: 2023-12-20 | End: 2023-12-21 | Stop reason: HOSPADM

## 2023-12-20 RX ORDER — HEPARIN SODIUM,PORCINE 10 UNIT/ML
5 VIAL (ML) INTRAVENOUS
Status: DISCONTINUED | OUTPATIENT
Start: 2023-12-20 | End: 2023-12-21 | Stop reason: HOSPADM

## 2023-12-20 RX ORDER — ALBUTEROL SULFATE 90 UG/1
1-2 AEROSOL, METERED RESPIRATORY (INHALATION)
Status: DISCONTINUED | OUTPATIENT
Start: 2023-12-20 | End: 2023-12-21 | Stop reason: HOSPADM

## 2023-12-26 ENCOUNTER — MYC REFILL (OUTPATIENT)
Dept: FAMILY MEDICINE | Facility: CLINIC | Age: 68
End: 2023-12-26
Payer: COMMERCIAL

## 2023-12-26 DIAGNOSIS — F90.0 ATTENTION DEFICIT HYPERACTIVITY DISORDER (ADHD), PREDOMINANTLY INATTENTIVE TYPE: ICD-10-CM

## 2023-12-27 RX ORDER — METHYLPHENIDATE HYDROCHLORIDE 54 MG/1
54 TABLET, EXTENDED RELEASE ORAL EVERY MORNING
Qty: 30 TABLET | Refills: 0 | Status: SHIPPED | OUTPATIENT
Start: 2023-12-27

## 2023-12-27 NOTE — TELEPHONE ENCOUNTER
Diagnosis: 1) ADHD 2) insomnia 3) chronic pain  Medication : 1) Methylphenidate 54 mg CR 2) zolpidem 10 mg 3 pregabalin 200 mg tid  Quantity per month: 1) 30) 30 3) 90  Number of refills before follow up visit: 6 months - may be by lissett SALAMANCA 5/22/23  Urine drug screen 12/13/22    Has lab appointment scheduled for tests including drug screen    11/27/2023 11/27/2023 1 Concerta Er 54 Mg Tablet 30.00 30 Ma Win [id number] [facility] ([id number]   09/28/2023 06/29/2023 1 Concerta Er 54 Mg Tablet 30.00 30 Ma [facility] [id number] [facility] ([id number][place]   08/31/2023 06/29/2023 1 Concerta Er 54 Mg Tablet 30.00 30 Ma Win [id number] [facility] (5605)

## 2023-12-28 ENCOUNTER — MYC MEDICAL ADVICE (OUTPATIENT)
Dept: FAMILY MEDICINE | Facility: CLINIC | Age: 68
End: 2023-12-28
Payer: COMMERCIAL

## 2023-12-28 DIAGNOSIS — F98.8 ATTENTION DEFICIT DISORDER (ADD) WITHOUT HYPERACTIVITY: Primary | ICD-10-CM

## 2023-12-28 RX ORDER — METHYLPHENIDATE HYDROCHLORIDE 54 MG/1
54 TABLET ORAL EVERY MORNING
Qty: 30 TABLET | Refills: 0 | Status: SHIPPED | OUTPATIENT
Start: 2023-12-28 | End: 2024-01-24

## 2024-01-04 ENCOUNTER — E-VISIT (OUTPATIENT)
Dept: INTERNAL MEDICINE | Facility: CLINIC | Age: 69
End: 2024-01-04
Payer: COMMERCIAL

## 2024-01-04 ENCOUNTER — LAB (OUTPATIENT)
Dept: LAB | Facility: CLINIC | Age: 69
End: 2024-01-04
Attending: FAMILY MEDICINE
Payer: COMMERCIAL

## 2024-01-04 DIAGNOSIS — R05.1 ACUTE COUGH: Primary | ICD-10-CM

## 2024-01-04 DIAGNOSIS — R50.9 FEVER, UNSPECIFIED FEVER CAUSE: ICD-10-CM

## 2024-01-04 DIAGNOSIS — R51.9 ACUTE NONINTRACTABLE HEADACHE, UNSPECIFIED HEADACHE TYPE: ICD-10-CM

## 2024-01-04 DIAGNOSIS — J02.9 SORE THROAT: ICD-10-CM

## 2024-01-04 DIAGNOSIS — R05.1 ACUTE COUGH: ICD-10-CM

## 2024-01-04 LAB
DEPRECATED S PYO AG THROAT QL EIA: NEGATIVE
FLUAV AG SPEC QL IA: NEGATIVE
FLUBV AG SPEC QL IA: NEGATIVE
GROUP A STREP BY PCR: NOT DETECTED

## 2024-01-04 PROCEDURE — 87635 SARS-COV-2 COVID-19 AMP PRB: CPT

## 2024-01-04 PROCEDURE — 99421 OL DIG E/M SVC 5-10 MIN: CPT | Performed by: FAMILY MEDICINE

## 2024-01-04 PROCEDURE — 87804 INFLUENZA ASSAY W/OPTIC: CPT

## 2024-01-04 PROCEDURE — 87651 STREP A DNA AMP PROBE: CPT

## 2024-01-04 NOTE — PATIENT INSTRUCTIONS
Remedios,    I think it would be reasonable to test you for Flu, Covid, Strep throat  - we are seeing ALL of these. You can schedule a lab only appointment right here in [facility], or by calling [phone]. Please schedule today if possible - we'd want to know if covid is positive and start treatment by tomorrow.    If we start paxlovid, you'd have to stop atorvastatin while taking this.  If you took this medication this morning, do not take it again tomorrow; .  If you normally take it at night, do not take atorvastatin tonight , just in case    You will receive your lab results and next steps via [facility] when the lab results return.    Sincerely,  Avril Hernandez MD

## 2024-01-05 LAB — SARS-COV-2 RNA RESP QL NAA+PROBE: NEGATIVE

## 2024-01-08 ENCOUNTER — VIRTUAL VISIT (OUTPATIENT)
Dept: URGENT CARE | Facility: CLINIC | Age: 69
End: 2024-01-08
Payer: COMMERCIAL

## 2024-01-08 ENCOUNTER — NURSE TRIAGE (OUTPATIENT)
Dept: NURSING | Facility: CLINIC | Age: 69
End: 2024-01-08
Payer: COMMERCIAL

## 2024-01-08 DIAGNOSIS — R05.9 COUGH, UNSPECIFIED TYPE: Primary | ICD-10-CM

## 2024-01-08 PROCEDURE — 99213 OFFICE O/P EST LOW 20 MIN: CPT | Mod: 95

## 2024-01-08 RX ORDER — BENZONATATE 100 MG/1
100 CAPSULE ORAL 3 TIMES DAILY PRN
Qty: 30 CAPSULE | Refills: 0 | Status: SHIPPED | OUTPATIENT
Start: 2024-01-08 | End: 2024-06-04

## 2024-01-08 RX ORDER — ALBUTEROL SULFATE 90 UG/1
2 AEROSOL, METERED RESPIRATORY (INHALATION) EVERY 4 HOURS PRN
Qty: 18 G | Refills: 0 | Status: SHIPPED | OUTPATIENT
Start: 2024-01-08

## 2024-01-08 RX ORDER — AZITHROMYCIN 250 MG/1
TABLET, FILM COATED ORAL
Qty: 6 TABLET | Refills: 0 | Status: SHIPPED | OUTPATIENT
Start: 2024-01-08 | End: 2024-01-13

## 2024-01-08 NOTE — TELEPHONE ENCOUNTER
Had an E visit before and they did covid, flu and strep and all negative. Hacking yellow green productive cough. Nasal congestion. Feels ill. Can she get an antibiotic since she had an e visit? Overwhelming, wants a [facility] visit. I connected her with scheduling. I told her she would have to be seen for them to prescribe anything. She declined a clinic appointment or going into urgent care because she doesn't feel up to getting dressed and going out.  Concepcion Louie RN  [facility]    Reason for Disposition   Prescription request for new medicine (not a refill)    Additional Information   Negative: Intentional drug overdose and suicidal thoughts or ideas   Negative: Drug overdose and triager unable to answer question   Negative: Caller requesting a renewal or refill of a medicine patient is currently taking   Negative: Caller requesting information unrelated to medicine   Negative: Caller requesting information about COVID-19 Vaccine   Negative: Caller requesting information about Emergency Contraception   Negative: Caller requesting information about Combined Birth Control Pills   Negative: Caller requesting information about Progestin Birth Control Pills   Negative: Caller requesting information about Post-Op pain or medicines   Negative: Caller requesting a prescription antibiotic (such as penicillin) for Strep throat and has a positive culture result   Negative: Caller requesting a prescription anti-viral med (such as Tamiflu) and has influenza (flu) symptoms   Negative: Immunization reaction suspected   Negative: Rash while taking a medicine or within 3 days of stopping it   Negative: Asthma and having symptoms of asthma (cough, wheezing, etc.)   Negative: Symptom of illness (e.g., headache, abdominal pain, earache, vomiting) that are more than mild   Negative: Breastfeeding questions about mother's medicines and diet   Negative: MORE THAN A DOUBLE DOSE of a prescription or over-the-counter  (OTC) drug   Negative: DOUBLE DOSE (an extra dose or lesser amount) of prescription drug and any symptoms (e.g., dizziness, nausea, pain, sleepiness)   Negative: DOUBLE DOSE (an extra dose or lesser amount) of over-the-counter (OTC) drug and any symptoms (e.g., dizziness, nausea, pain, sleepiness)   Negative: Took another person's prescription drug   Negative: DOUBLE DOSE (an extra dose or lesser amount) of prescription drug and NO symptoms  (Exception: A double dose of antibiotics.)   Negative: Diabetes drug error or overdose (e.g., took wrong type of insulin or took extra dose)   Negative: Caller has medication question about med NOT prescribed by PCP and triager unable to answer question (e.g., compatibility with other med, storage)   Negative: Prescription not at pharmacy and was prescribed by PCP recently  (Exception: triager has access to EMR and prescription is recorded there. Go to Home Care and confirm for pharmacy.)   Negative: Pharmacy calling with prescription question and triager unable to answer question   Negative: Caller has URGENT medicine question about med that PCP or specialist prescribed and triager unable to answer question   Negative: Caller has NON-URGENT medicine question about med that PCP or specialist prescribed and triager unable to answer question   Negative: Caller wants to use a complementary or alternative medicine   Negative: Medicine patch causing local rash or itching    Protocols used: Medication Question Call-A-OH

## 2024-01-09 NOTE — PROGRESS NOTES
"  Cindy Beltrán is a 68 year old female who is being evaluated via a billable video visit.      The patient has been notified of following at the time of scheduling video visit:     \"This video visit will be conducted via a video call between you and your physician/provider. We have found that certain health care needs can be provided without the need for a physical exam.  This service lets us provide the care you need with a video conversation.  If a prescription is necessary we can send it directly to your pharmacy.  If lab work is needed we can place an order for that and you can then stop by our lab to have the test done at a later time.\"   Patient has given consent for video visit?  YES    SUBJECTIVE:  Cindy Beltrán is an 68 year old female who presents for cough.  Started about 10 days ago.  Has had cough, nasal congestion, headache, body aches.  Some sore throat and some drainage.  Sometimes cough is a little productive.  Occasionally gets chills or sweats but max temp has been 99.  No v/d.  Some decreased appetite.  Had negative flu, covid, and strep tests done on 1/4/24.  Does not feel like she's getting better.      PMH:   has a past medical history of Anxiety, Back pain, Cardiomyopathy (H), Cardiomyopathy (H), CHF (congestive heart failure) (H), Constipation, Coronary atherosclerosis due to calcified coronary lesion (12/4/2017), Depression, Depression with anxiety, GI bleed (2010), Hyperlipidemia, Hypertension, Leg weakness, bilateral, LGSIL on Pap smear of cervix (4/20/2007), Osteoporosis, Osteoporosis, and Other chronic pain.  Patient Active Problem List   Diagnosis    Alcohol dependence in remission (H)    Cervical neuralgia    ADD (attention deficit disorder)    Mitral regurgitation    Chronic midline low back pain    Idiopathic cardiomyopathy (H)    Left carotid stenosis    Primary insomnia    Mixed hyperlipidemia    Coronary atherosclerosis due to calcified coronary lesion    Chronic " diastolic CHF (congestive heart failure), NYHA class 2 (H)    Cervical cancer screening    Controlled substance agreement signed    Age-related osteoporosis without current pathological fracture     Social History     Socioeconomic History    Marital status:    Tobacco Use    Smoking status: Never     Passive exposure: Never    Smokeless tobacco: Never   Vaping Use    Vaping Use: Never used   Substance and Sexual Activity    Alcohol use: No     Alcohol/week: 0.0 standard drinks of alcohol     Comment: Alcoholic Drinks/day: stop drinking in OCt 2004    Drug use: No    Sexual activity: Yes     Partners: Male     Social Determinants of Health     Financial Resource Strain: Low Risk  (10/1/2023)    Financial Resource Strain     Within the past 12 months, have you or your family members you live with been unable to get utilities (heat, electricity) when it was really needed?: No   Food Insecurity: Low Risk  (10/1/2023)    Food Insecurity     Within the past 12 months, did you worry that your food would run out before you got money to buy more?: No     Within the past 12 months, did the food you bought just not last and you didn t have money to get more?: No   Transportation Needs: Low Risk  (10/1/2023)    Transportation Needs     Within the past 12 months, has lack of transportation kept you from medical appointments, getting your medicines, non-medical meetings or appointments, work, or from getting things that you need?: No   Interpersonal Safety: Low Risk  (10/6/2023)    Interpersonal Safety     Do you feel physically and emotionally safe where you currently live?: Yes     Within the past 12 months, have you been hit, slapped, kicked or otherwise physically hurt by someone?: No     Within the past 12 months, have you been humiliated or emotionally abused in other ways by your partner or ex-partner?: No   Housing Stability: Low Risk  (10/1/2023)    Housing Stability     Do you have housing? : Yes     Are you  worried about losing your housing?: No     Family History   Problem Relation Age of Onset    Alcoholism Mother     Heart Disease Mother     Hypertension Mother     Hyperlipidemia Mother     Heart Disease Father     Hypertension Father     Hyperlipidemia Father     Alcoholism Sister     Breast Cancer Sister 60        lumpecteomy    Alcoholism Brother         age 50    Substance Abuse Son     Breast Cancer Maternal Aunt 58        had it twice passed away 70    Breast Cancer Maternal Aunt 80    Colon Cancer Paternal Uncle        ALLERGIES:  Patient has no known allergies.    Current Outpatient Medications   Medication    ACE/ARB/ARNI NOT PRESCRIBED (INTENTIONAL)    amoxicillin (AMOXIL) 500 MG tablet    ASPIRIN NOT PRESCRIBED (INTENTIONAL)    atorvastatin (LIPITOR) 20 MG tablet    carvedilol (COREG) 25 MG tablet    cholecalciferol 25 MCG (1000 UT) TABS    CONCERTA 54 MG CR tablet    methylphenidate HCl ER, OSM, (CONCERTA) 54 MG CR tablet    Pregabalin (LYRICA) 200 MG capsule    traZODone (DESYREL) 100 MG tablet    zolpidem (AMBIEN) 10 MG tablet     No current facility-administered medications for this visit.         ROS:  ROS is done and is negative for general/constitutional, eye, ENT, Respiratory, cardiovascular, GI, , Skin, musculoskeletal except as noted elsewhere.  All other review of systems negative except as noted elsewhere.      OBJECTIVE:    No vital signs obtained as is virtual visit    GENERAL: Healthy, alert and no distress  EYES: Eyes grossly normal to inspection.  No discharge or erythema, or obvious scleral/conjunctival abnormalities.  RESP: frequent cough while talking and sounds hoarse.  No audible wheeze, or visible cyanosis.  No visible retractions or increased work of breathing.    SKIN: Visible skin clear. No significant rash, abnormal pigmentation or lesions.  NEURO: Cranial nerves grossly intact.  Mentation and speech appropriate for age.  PSYCH: Mentation appears normal, affect normal/bright,  judgement and insight intact, normal speech and appearance well-groomed.         ASSESSMENT/PLAN:    ASSESSMENT / PLAN:  (R05.9) Cough, unspecified type  (primary encounter diagnosis)  Comment: currently most c/with atypical or prolonged viral etiology.  Given ongoing sxs, will cover for atypicals with zmax.  Also tessalon and albuterol for cough sxs.  Plan: azithromycin (ZITHROMAX) 250 MG tablet,         benzonatate (TESSALON) 100 MG capsule,         albuterol (PROAIR HFA/PROVENTIL HFA/VENTOLIN         HFA) 108 (90 Base) MCG/ACT inhaler        Reviewed medication instructions and side effects. Follow up if experiences side effects.. .docsup        See [facility] for orders, medications, letters, patient instructions    Magdalena Brennan MD  1/8/2024, 6:24 PM    Video-Visit Details    Video Start Time: 6:25    Type of service:  Video Visit    Video End Time:6:37 PM    Originating Location (pt. Location): Home    Distant Location (provider location):  [facility]     Platform used for Video Visit: [employer]

## 2024-01-19 DIAGNOSIS — M81.0 AGE-RELATED OSTEOPOROSIS WITHOUT CURRENT PATHOLOGICAL FRACTURE: Primary | ICD-10-CM

## 2024-01-23 DIAGNOSIS — M54.12 CERVICAL NEURALGIA: ICD-10-CM

## 2024-01-23 RX ORDER — PREGABALIN 200 MG/1
CAPSULE ORAL
Qty: 90 CAPSULE | Refills: 0 | Status: SHIPPED | OUTPATIENT
Start: 2024-01-29 | End: 2024-02-25

## 2024-01-24 ENCOUNTER — MYC REFILL (OUTPATIENT)
Dept: FAMILY MEDICINE | Facility: CLINIC | Age: 69
End: 2024-01-24
Payer: COMMERCIAL

## 2024-01-24 DIAGNOSIS — F98.8 ATTENTION DEFICIT DISORDER (ADD) WITHOUT HYPERACTIVITY: ICD-10-CM

## 2024-01-24 NOTE — TELEPHONE ENCOUNTER
Diagnosis: 1) ADHD 2) insomnia 3) chronic pain  Medication : 1) Methylphenidate 54 mg CR 2) zolpidem 10 mg 3 pregabalin 200 mg tid  Quantity per month: 1) 30) 30 3) 90  Number of refills before follow up visit: 6 months - may be by lissett SALAMANCA 5/22/23  Urine drug screen  - has lab leana     12/30/2023 10/20/2023 1 Pregabalin 200 Mg Capsule 90.00 30 Ma [facility] [id number] [facility] (5605) 1/1 4.02 [place]   11/23/2023 10/20/2023 1 Pregabalin 200 Mg Capsule 90.00 30 Ma [facility] [id number] [facility] (5605[place] 0/1 [place]

## 2024-01-25 RX ORDER — METHYLPHENIDATE HYDROCHLORIDE 54 MG/1
54 TABLET ORAL EVERY MORNING
Qty: 30 TABLET | Refills: 0 | Status: SHIPPED | OUTPATIENT
Start: 2024-01-25 | End: 2024-02-25

## 2024-01-25 NOTE — TELEPHONE ENCOUNTER
Diagnosis: 1) ADHD 2) insomnia 3) chronic pain  Medication : 1) Methylphenidate 54 mg CR 2) zolpidem 10 mg 3 pregabalin 200 mg tid  Quantity per month: 1) 30) 30 3) 90  Number of refills before follow up visit: 6 months - may be by lissett SALAMANCA 5/22/23  Urine drug screen 12/13/22     Sent message to schedule Follow up in April, to to urine screen (was not able to come in due to illness) and also since that will be 6 months from annual exam in October 11/27/2023 11/27/2023 1 Concerta Er 54 Mg Tablet 30.00 30 Ma [facility] [id number] [facility] ([facility]605[place] 0/[place]   09/28/2023 06/29/2023 1 Concerta Er 54 Mg Tablet 30.00 30 Ma Win [id number] [facility] [place]   08/31/2023 06/29/2023 1 Concerta Er 54 Mg Tablet 30.00 30 Ma Win [id number] [facility] (5605) 0/0

## 2024-02-25 ENCOUNTER — MYC REFILL (OUTPATIENT)
Dept: FAMILY MEDICINE | Facility: CLINIC | Age: 69
End: 2024-02-25
Payer: COMMERCIAL

## 2024-02-25 DIAGNOSIS — M54.12 CERVICAL NEURALGIA: ICD-10-CM

## 2024-02-25 DIAGNOSIS — Z79.899 CONTROLLED SUBSTANCE AGREEMENT SIGNED: Primary | ICD-10-CM

## 2024-02-25 DIAGNOSIS — F98.8 ATTENTION DEFICIT DISORDER (ADD) WITHOUT HYPERACTIVITY: ICD-10-CM

## 2024-02-26 RX ORDER — METHYLPHENIDATE HYDROCHLORIDE 54 MG/1
54 TABLET ORAL EVERY MORNING
Qty: 30 TABLET | Refills: 0 | Status: SHIPPED | OUTPATIENT
Start: 2024-02-26 | End: 2024-03-24

## 2024-02-26 RX ORDER — PREGABALIN 200 MG/1
CAPSULE ORAL
Qty: 90 CAPSULE | Refills: 2 | Status: SHIPPED | OUTPATIENT
Start: 2024-02-26 | End: 2024-05-26

## 2024-02-26 NOTE — TELEPHONE ENCOUNTER
Diagnosis: 1) ADHD 2) insomnia 3) chronic pain  Medication : 1) Methylphenidate 54 mg CR 2) zolpidem 10 mg 3 pregabalin 200 mg tid  Quantity per month: 1) 30) 30 3) 90  Number of refills before follow up visit: 6 months - may be by lissett SALAMANCA 5/22/23  Urine drug screen 10/20/23    Last visit 10/6/23    01/25/2024 01/25/2024 1 Methylphenidate Er 54 Mg Tab 30.00 30 Ma Win [id number] [facility] [place]605[place]   01/23/2024 01/23/2024 1 Pregabalin 200 Mg Capsule 90.00 30 Ma Win [id number] [facility] (5605[place] 0/[place] 4.[place]   12/30/2023 10/20/2023 1 Pregabalin 200 Mg Capsule 90.00 30 Ma Win [id number] [facility] (5605[place] 1/1 [place]   12/30/2023 12/28/2023 1 Methylphenidate Er 54 Mg Tab 30.00 30 Ma Win [id number] [facility] [place]605[place]

## 2024-03-14 DIAGNOSIS — I50.32 CHRONIC DIASTOLIC CHF (CONGESTIVE HEART FAILURE), NYHA CLASS 2 (H): ICD-10-CM

## 2024-03-14 DIAGNOSIS — G47.00 PERSISTENT INSOMNIA: ICD-10-CM

## 2024-03-14 RX ORDER — CARVEDILOL 25 MG/1
TABLET ORAL
Qty: 90 TABLET | Refills: 2 | Status: SHIPPED | OUTPATIENT
Start: 2024-03-14

## 2024-03-14 RX ORDER — TRAZODONE HYDROCHLORIDE 100 MG/1
100 TABLET ORAL AT BEDTIME
Qty: 90 TABLET | Refills: 1 | Status: SHIPPED | OUTPATIENT
Start: 2024-03-14 | End: 2024-09-05

## 2024-03-24 ENCOUNTER — MYC REFILL (OUTPATIENT)
Dept: FAMILY MEDICINE | Facility: CLINIC | Age: 69
End: 2024-03-24
Payer: COMMERCIAL

## 2024-03-24 DIAGNOSIS — F98.8 ATTENTION DEFICIT DISORDER (ADD) WITHOUT HYPERACTIVITY: ICD-10-CM

## 2024-03-26 RX ORDER — METHYLPHENIDATE HYDROCHLORIDE 54 MG/1
54 TABLET ORAL EVERY MORNING
Qty: 30 TABLET | Refills: 0 | Status: SHIPPED | OUTPATIENT
Start: 2024-03-26 | End: 2024-04-27

## 2024-03-26 NOTE — TELEPHONE ENCOUNTER
Has lab leana on 3/29/24    Diagnosis: 1) ADHD 2) insomnia 3) chronic pain  Medication : 1) Methylphenidate 54 mg CR 2) zolpidem 10 mg 3 pregabalin 200 mg tid  Quantity per month: 1) 30) 30 3) 90  Number of refills before follow up visit: 6 months - may be by lissett SALAMANCA 5/22/23  Urine drug screen 10/20/23     Last visit 10/6/23        02/26/2024 02/26/2024 1 Methylphenidate Er 54 Mg Tab 30.00 30 Ma Win [id number] [facility] [place]   02/26/2024 02/26/2024 1 Pregabalin 200 Mg Capsule 90.00 30 Ma Win [id number] [facility] (5605[place] 0/2 4.[place]   01/25/2024 01/25/2024 1 Methylphenidate Er 54 Mg Tab 30.00 30 Ma Win [id number] [facility] [place]605[place]   01/23/2024 01/23/2024 1 Pregabalin 200 Mg Capsule 90.00 30 Ma [facility] [id number] [facility] (5605) 0/0 4.[place]

## 2024-04-04 DIAGNOSIS — M81.0 AGE-RELATED OSTEOPOROSIS WITHOUT CURRENT PATHOLOGICAL FRACTURE: Primary | ICD-10-CM

## 2024-05-26 ENCOUNTER — MYC REFILL (OUTPATIENT)
Dept: FAMILY MEDICINE | Facility: CLINIC | Age: 69
End: 2024-05-26
Payer: COMMERCIAL

## 2024-05-26 DIAGNOSIS — M54.12 CERVICAL NEURALGIA: ICD-10-CM

## 2024-05-26 DIAGNOSIS — F98.8 ATTENTION DEFICIT DISORDER (ADD) WITHOUT HYPERACTIVITY: ICD-10-CM

## 2024-05-27 RX ORDER — PREGABALIN 200 MG/1
CAPSULE ORAL
Qty: 90 CAPSULE | Refills: 0 | Status: SHIPPED | OUTPATIENT
Start: 2024-05-30 | End: 2024-06-04

## 2024-05-27 RX ORDER — METHYLPHENIDATE HYDROCHLORIDE 54 MG/1
54 TABLET ORAL EVERY MORNING
Qty: 30 TABLET | Refills: 0 | Status: SHIPPED | OUTPATIENT
Start: 2024-05-30

## 2024-05-27 NOTE — TELEPHONE ENCOUNTER
Diagnosis: 1) ADHD 2) insomnia 3) chronic pain  Medication : 1) Methylphenidate 54 mg CR 2) zolpidem 10 mg 3 pregabalin 200 mg tid  Quantity per month: 1) 30) 30 3) 90  Number of refills before follow up visit: 6 months - may be by lissett SALAMANCA 5/22/23  Urine drug screen 10/20/23    04/30/2024 02/26/2024 1 Pregabalin 200 Mg Capsule 90.00 30 Ma Win [id number] [facility] ([facility]605[place] 1/1 [place]   04/30/2024 04/30/2024 1 Methylphenidate Er 54 Mg Tab 30.00 30 Ma Win [id number] [facility] [place]605[place]   03/30/2024 02/26/2024 1 Pregabalin 200 Mg Capsule 90.00 30 Ma Win [id number] [facility] (5605[place] 0/1 4.[place]   03/30/2024 03/26/2024 1 Methylphenidate Er 54 Mg Tab 30.00 30 Ma Win [id number] [facility] [place]605[place]   02/26/2024 02/26/2024 1 Methylphenidate Er 54 Mg Tab 30.00 30 Ma Win [id number] [facility] [place]605[place]   02/26/2024 02/26/2024 1 Pregabalin 200 Mg Capsule 90.00 30 Ma Win [id number] [facility] (5605[place] 0/2 4.[place]           ----    [email or web address] message sent to patient    Alexander Whittaker    I provided one month refills on your medications.    Here is your controlled substance agreement:    Diagnosis: 1) ADHD 2) insomnia 3) chronic pain  Medication : 1) Methylphenidate 54 mg CR 2) zolpidem 10 mg 3 pregabalin 200 mg tid  Quantity per month: 1) 30) 30 3) 90  Number of refills before follow up visit: 6 months - may be by [employer]      YOU ARE DUE FOR A Follow up  visit before further refills - please request an e-visit for other before further refills.    In that visit, please copy these questions at the end of this note and provide answers to those questions:    Thank you,    Avril Hernandez MD

## 2024-06-03 ENCOUNTER — TELEPHONE (OUTPATIENT)
Dept: FAMILY MEDICINE | Facility: CLINIC | Age: 69
End: 2024-06-03
Payer: COMMERCIAL

## 2024-06-04 ENCOUNTER — OFFICE VISIT (OUTPATIENT)
Dept: FAMILY MEDICINE | Facility: CLINIC | Age: 69
End: 2024-06-04
Payer: COMMERCIAL

## 2024-06-04 VITALS
HEART RATE: 85 BPM | SYSTOLIC BLOOD PRESSURE: 122 MMHG | OXYGEN SATURATION: 98 % | DIASTOLIC BLOOD PRESSURE: 77 MMHG | BODY MASS INDEX: 19.5 KG/M2 | RESPIRATION RATE: 16 BRPM | HEIGHT: 64 IN | TEMPERATURE: 97.8 F | WEIGHT: 114.2 LBS

## 2024-06-04 DIAGNOSIS — M54.12 CERVICAL NEURALGIA: ICD-10-CM

## 2024-06-04 DIAGNOSIS — Z79.899 CONTROLLED SUBSTANCE AGREEMENT SIGNED: ICD-10-CM

## 2024-06-04 DIAGNOSIS — F98.8 ATTENTION DEFICIT DISORDER (ADD) WITHOUT HYPERACTIVITY: ICD-10-CM

## 2024-06-04 DIAGNOSIS — E78.2 MIXED HYPERLIPIDEMIA: Primary | ICD-10-CM

## 2024-06-04 DIAGNOSIS — F51.01 PRIMARY INSOMNIA: ICD-10-CM

## 2024-06-04 PROCEDURE — G0481 DRUG TEST DEF 8-14 CLASSES: HCPCS | Performed by: FAMILY MEDICINE

## 2024-06-04 PROCEDURE — 99214 OFFICE O/P EST MOD 30 MIN: CPT | Performed by: FAMILY MEDICINE

## 2024-06-04 RX ORDER — ZOLPIDEM TARTRATE 10 MG/1
TABLET ORAL
Qty: 30 TABLET | Refills: 1 | Status: SHIPPED | OUTPATIENT
Start: 2024-06-04

## 2024-06-04 RX ORDER — PREGABALIN 200 MG/1
CAPSULE ORAL
Qty: 90 CAPSULE | Refills: 5 | Status: SHIPPED | OUTPATIENT
Start: 2024-06-29 | End: 2024-07-05

## 2024-06-04 ASSESSMENT — PAIN SCALES - GENERAL: PAINLEVEL: NO PAIN (0)

## 2024-06-04 NOTE — PROGRESS NOTES
Assessment & Plan     Controlled substance agreement signed  - Drug Confirmation Panel Urine with Creat - lab collect    Cervical neuralgia  continue  - Pregabalin (LYRICA) 200 MG capsule  Dispense: 90 capsule; Refill: 5      Primary insomnia  Rare use of  - zolpidem (AMBIEN) 10 MG tablet  Dispense: 30 tablet; Refill: 1   - This will likely last her at least a year    Attention deficit disorder (ADD) without hyperactivity  I sent in a prescription starting for May 30.  The below prescriptions are postdated for the rest of the summer.  I am not positive her pharmacy will take these postdated prescriptions -if not my colleagues will have to cover the over the summer alone on sabbatical  - methylphenidate HCl ER, OSM, (CONCERTA) 54 MG CR tablet  Dispense: 30 tablet; Refill: 0  - methylphenidate HCl ER, OSM, (CONCERTA) 54 MG CR tablet  Dispense: 30 tablet; Refill: 0  - methylphenidate HCl ER, OSM, (CONCERTA) 54 MG CR tablet  Dispense: 30 tablet; Refill: 0      Return in about 4 months (around 10/4/2024) for medicare annual wellness.      Ricky Whittaker is a 69 year old, presenting for the following health issues:  Refill Request and Recheck Medication (Pt reports that she is here for medication refill and control substance agreement.)        6/4/2024    11:35 AM   Additional Questions   Roomed by lesly   Accompanied by alone         6/4/2024    11:35 AM   Patient Reported Additional Medications   Patient reports taking the following new medications none     History of Present Illness       Reason for visit:  Medication check    She eats 2-3 servings of fruits and vegetables daily.She consumes 0 sweetened beverage(s) daily.She exercises with enough effort to increase her heart rate 60 or more minutes per day.  She exercises with enough effort to increase her heart rate 6 days per week.   She is taking medications regularly.    Controlled substance agreement - re-signed  Diagnosis: 1) ADHD 2) insomnia 3) chronic  "pain  Medication : 1) Methylphenidate 54 mg CR 2) zolpidem 10 mg 3) pregabalin 200 mg tid  Quantity per month: 1) 30) 30 for 3 months 3) 90  Number of refills before follow up visit: 6 months - may be by lissett    ADHD -she is not working anymore, check appointment.  Finding it helpful for her to get through daily activities    Insomnia -now she rarely takes zolpidem    Chronic pain due to cervical neuralgia: Well-managed pregabalin    --    Remedios is doing well overall.   She walks 3 to 4 miles on the treadmill daily.  [place] been [place] and [place] - Dad is in a hospital with pneumonia and is on high flow O2 or a long time - then to rehab and never quite recovered, and he has a feeding tube, has achalasia NPO - the place he is in is beautiful and they take good care of him -he is sharp    Her puppy Koko is not doing well - yesterday  panted all day .  She and her  took him to the vet -she had a high bilirubin level and high liver enzymes.  Liver scan was ordered.    She is so worried about Homer.  Her daughter is coming out to be supportive.  Homer has previous history of hemorrhagic gastroenteritis, bladder stones, and a joint issue in his right leg          Objective    /77 (BP Location: Left arm, Patient Position: Sitting, Cuff Size: Adult Regular)   Pulse 85   Temp 97.8  F (36.6  C) (Tympanic)   Resp 16   Ht 1.626 m (5' 4\")   Wt 51.8 kg (114 lb 3.2 oz)   LMP  (LMP Unknown)   SpO2 98%   Breastfeeding No   BMI 19.60 kg/m    Body mass index is 19.6 kg/m .  Physical Exam   GENERAL: alert and no distress  RESP: lungs clear to auscultation - no rales, rhonchi or wheezes  CV: regular rate and rhythm, normal S1 S2, no S3 or S4, no murmur, click or rub, no peripheral edema   PSYCH: mentation appears normal, affect normal/bright            Signed Electronically by: Avril Hernandez MD    "

## 2024-06-04 NOTE — TELEPHONE ENCOUNTER
Visit on 6/4/2024 should be IN PERSON, as previously noted she is overdue for controlled substance agreement as well as urine drug screen.  This cannot be done virtually.  Please see if she can come into the clinic instead of doing a virtual visit.  She did not come for the lab appointment below.    Donna Rogers     AS    5/1/24  9:55 AM  Note  Pt has a lab appointment 5/7/24 April 30, 2024  Me   to [facility] Scheduling Registration Pool         4/30/24  5:27 PM   1 month refill given NEEDS Follow up visit before further refills, due for CSA - Please contact patient to schedule    Canceled multiple lab appts - is she OK?

## 2024-06-04 NOTE — LETTER
[facility]  06/04/24  Patient: Cindy Beltrán  YOB: 1955  Medical Record Number: [id number]                                                                                  Non-Opioid Controlled Substance Agreement  This is an agreement between you and your provider regarding safe and appropriate use of controlled substances prescribed by your care team. Controlled substances are?medicines that can cause physical and mental dependence (abuse).     There are strict laws about having and using these medicines. We here at [facility] are  committed to working with you in your efforts to get better. To support you in this work, we'll help you schedule regular office appointments for medicine refills. If we must cancel or change your appointment for any reason, we'll make sure you have enough medicine to last until your next appointment.   As a Provider, I will:   Listen carefully to your concerns while treating you with respect.   Recommend a treatment plan that I believe is in your best interest and may involve therapies other than medicine.    Talk with you often about the possible benefits and the risk of harm of any medicine that we prescribe for you.  Assess the safety of this medicine and check how well it works.    Provide a plan on how to taper (discontinue or go off) using this medicine if the decision is made to stop its use.    ::  As a Patient, I understand controlled substances:     Are prescribed by my care provider to help me function or work and manage my condition(s).?  Are strong medicines and can cause serious side effects.     Need to be taken exactly as prescribed.?Combining controlled substances with certain medicines or chemicals (such as illegal drugs, alcohol, sedatives, sleeping pills, and benzodiazepines) can be dangerous or even fatal.? If I stop taking my medicines suddenly, I may have severe withdrawal symptoms.     Diagnosis: 1) ADHD 2) insomnia  3) chronic pain  Medication : 1) Methylphenidate 54 mg CR 2) zolpidem 10 mg 3) pregabalin 200 mg tid  Quantity per month: 1) 30) 30 for 3 months 3) 90  Number of refills before follow up visit: 6 months - may be by lissett    The risks, benefits, and side effects of these medicine(s) were explained to me. I agree that:    I will take part in other treatments as advised by my care team. This may be psychiatry or counseling, physical therapy, behavioral therapy, group treatment or a referral to specialist.  I will keep all my appointments and understand this is part of the monitoring of controlled substances.?My care team may require an office visit for EVERY controlled substance refill. If I miss appointments or don t follow instructions, my care team may stop my medicine  I will take my medicines as prescribed. I will not change the dose or schedule unless my care team tells me to. There will be no refills if I run out early.    I may be asked to come to the clinic and complete a urine drug test or complete a pill count. If I don t give a urine sample or participate in a pill count, the care team may stop my medicine.    I will only receive controlled substance prescriptions from this clinic. If I am treated by another provider, I will tell them that I am taking controlled substances and that I have a treatment agreement with this provider. I will inform my [facility] care team within one business day if I am given a prescription for any controlled substance by another healthcare provider. My [facility] care team can contact other providers and pharmacists about my use of any medicines.  It is up to me to make sure that I don't run out of my medicines on weekends or holidays.?If my care team is willing to refill my prescription without a visit, I must request refills only during office hours. Refills may take up to 3 business days to process. I will use one pharmacy to fill all my controlled substance  prescriptions. I will notify the clinic about any changes to my insurance or medicine availability.  I am responsible for my prescriptions. If the medicine/prescription is lost, stolen or destroyed, it will not be replaced.?I also agree not to share controlled substance medicines with anyone.   I am aware I should not use any illegal or recreational drugs. I agree not to drink alcohol unless my care team says I can.   If I enroll in the [place] Medical Cannabis program, I will tell my care team before my next refill.  I will tell my care team right away if I become pregnant, have a new medical problem treated outside of my regular clinic, or have a change in my medicines.   I understand that this medicine can affect my thinking, judgment and reaction time.? Alcohol and drugs affect the brain and body, which can affect the safety of my driving. Being under the influence of alcohol or drugs can affect my decision-making, behaviors, personal safety and the safety of others. Driving while impaired (DWI) can occur if a person is driving, operating or in physical control of a car, motorcycle, boat, snowmobile, ATV, motorbike, off-road vehicle or any other motor vehicle ([place] Statute 169A.20). I understand the risk if I choose to drive or operate any vehicle or machinery.    I understand that if I do not follow any of the conditions above, my prescriptions or treatment may be stopped or changed.   I agree that my provider, clinic care team and pharmacy may work with any city, state or federal law enforcement agency that investigates the misuse, sale or other diversion of my controlled medicine. I will allow my provider to discuss my care with, or share a copy of, this agreement with any other treating provider, pharmacy or emergency room where I receive care.     I have read this agreement and have asked questions about anything I did not understand.    ________________________________________________________  Patient  Signature - Cindy M Jerel     ___________________                   Date     ________________________________________________________  Provider Signature - Avril M. Fritch, MD       ___________________                   Date     ________________________________________________________  Witness Signature (required if provider not present while patient signing)          ___________________                   Date

## 2024-06-05 LAB — CREAT UR-MCNC: 11 MG/DL

## 2024-06-05 RX ORDER — METHYLPHENIDATE HYDROCHLORIDE 54 MG/1
54 TABLET ORAL DAILY
Qty: 30 TABLET | Refills: 0 | Status: SHIPPED | OUTPATIENT
Start: 2024-08-28 | End: 2024-09-27

## 2024-06-05 RX ORDER — METHYLPHENIDATE HYDROCHLORIDE 54 MG/1
54 TABLET ORAL DAILY
Qty: 30 TABLET | Refills: 0 | Status: SHIPPED | OUTPATIENT
Start: 2024-07-29 | End: 2024-08-28

## 2024-06-05 RX ORDER — METHYLPHENIDATE HYDROCHLORIDE 54 MG/1
54 TABLET ORAL DAILY
Qty: 30 TABLET | Refills: 0 | Status: SHIPPED | OUTPATIENT
Start: 2024-06-29 | End: 2024-07-29

## 2024-06-06 LAB
ME-PHENIDATE UR CFM-MCNC: 7500 NG/ML
ME-PHENIDATE/CREAT UR: ABNORMAL NG/MG {CREAT}
PREGABALIN UR QL CFM: PRESENT

## 2024-07-05 DIAGNOSIS — M54.12 CERVICAL NEURALGIA: ICD-10-CM

## 2024-07-05 RX ORDER — PREGABALIN 200 MG/1
CAPSULE ORAL
Qty: 90 CAPSULE | Refills: 0 | Status: SHIPPED | OUTPATIENT
Start: 2024-07-05

## 2024-08-02 ENCOUNTER — MYC REFILL (OUTPATIENT)
Dept: FAMILY MEDICINE | Facility: CLINIC | Age: 69
End: 2024-08-02
Payer: COMMERCIAL

## 2024-08-02 DIAGNOSIS — F98.8 ATTENTION DEFICIT DISORDER (ADD) WITHOUT HYPERACTIVITY: ICD-10-CM

## 2024-08-02 RX ORDER — METHYLPHENIDATE HYDROCHLORIDE 54 MG/1
54 TABLET ORAL DAILY
Qty: 30 TABLET | Refills: 0 | OUTPATIENT
Start: 2024-08-02

## 2024-08-13 ENCOUNTER — OFFICE VISIT (OUTPATIENT)
Dept: INTERNAL MEDICINE | Facility: CLINIC | Age: 69
End: 2024-08-13
Payer: COMMERCIAL

## 2024-08-13 ENCOUNTER — ANCILLARY PROCEDURE (OUTPATIENT)
Dept: GENERAL RADIOLOGY | Facility: CLINIC | Age: 69
End: 2024-08-13
Attending: INTERNAL MEDICINE
Payer: COMMERCIAL

## 2024-08-13 VITALS
HEART RATE: 87 BPM | DIASTOLIC BLOOD PRESSURE: 74 MMHG | OXYGEN SATURATION: 99 % | SYSTOLIC BLOOD PRESSURE: 125 MMHG | HEIGHT: 64 IN | WEIGHT: 112.3 LBS | BODY MASS INDEX: 19.17 KG/M2 | TEMPERATURE: 97.2 F | RESPIRATION RATE: 13 BRPM

## 2024-08-13 DIAGNOSIS — M25.561 RIGHT KNEE PAIN, UNSPECIFIED CHRONICITY: ICD-10-CM

## 2024-08-13 DIAGNOSIS — E78.2 MIXED HYPERLIPIDEMIA: Primary | ICD-10-CM

## 2024-08-13 DIAGNOSIS — M81.0 AGE-RELATED OSTEOPOROSIS WITHOUT CURRENT PATHOLOGICAL FRACTURE: ICD-10-CM

## 2024-08-13 PROCEDURE — 73562 X-RAY EXAM OF KNEE 3: CPT | Mod: TC | Performed by: RADIOLOGY

## 2024-08-13 PROCEDURE — 99213 OFFICE O/P EST LOW 20 MIN: CPT | Performed by: INTERNAL MEDICINE

## 2024-08-13 ASSESSMENT — PAIN SCALES - GENERAL: PAINLEVEL: NO PAIN (0)

## 2024-08-13 NOTE — PROGRESS NOTES
"  Assessment & Plan     Mixed hyperlipidemia    Right knee pain, unspecified chronicity  Given her level of athletic ability and no signs of severe arthritis like a significant effusion or reduced mobility or instability recommend can conservative care for now.  She has been doing heat ice and ibuprofen and stretching exercises.  Since she has not improved she may have medial meniscus soft tissue problems there is no Baker's cyst palpable.  No bursitis palpable.  Recommend early consultation with sports orthopedic.  - XR Knee Right 3 Views; Future  - Orthopedic  Referral; Future    Age-related osteoporosis without current pathological fracture                Ricky Whittaker is a 69 year old, presenting for the following health issues:  Used to be a jogger , but now notices a know it can lock when she sits cross legs     Knee Pain (Right knee pain/swelling )      8/13/2024     7:59 AM   Additional Questions   Roomed by che merlos     Knee Pain    History of Present Illness       Reason for visit:  Increased knee swelling and pain  Symptom onset:  More than a month  Symptoms include:  Knee pain, swelling,  Symptom intensity:  Moderate  Symptom progression:  Worsening  Had these symptoms before:  No  What makes it worse:  Exercise  What makes it better:  Ibuprofen,ice , elevate    She eats 2-3 servings of fruits and vegetables daily.She consumes 0 sweetened beverage(s) daily.She exercises with enough effort to increase her heart rate 60 or more minutes per day.  She exercises with enough effort to increase her heart rate 7 days per week.   She is taking medications regularly.                     Objective    /74 (BP Location: Left arm, Patient Position: Sitting, Cuff Size: Adult Regular)   Pulse 87   Temp 97.2  F (36.2  C)   Resp 13   Ht 1.626 m (5' 4\")   Wt 50.9 kg (112 lb 4.8 oz)   LMP  (LMP Unknown)   SpO2 99%   BMI 19.28 kg/m    Body mass index is 19.28 kg/m .  Physical Exam   GENERAL: alert " and no distress  NECK: no adenopathy, no asymmetry, masses, or scars  RESP: lungs clear to auscultation - no rales, rhonchi or wheezes  CV: regular rate and rhythm, normal S1 S2, no S3 or S4, no murmur, click or rub, no peripheral edema  Knees: No Baker's cyst full range of motion no some mild tenderness over the medial line full range of motion no instability no effusion            Signed Electronically by: Corrie Gonzalez MD

## 2024-08-23 NOTE — PROGRESS NOTES
ASSESSMENT & PLAN    Remedios was seen today for pain.    Diagnoses and all orders for this visit:    Strain of right hamstring, initial encounter  -     Physical Therapy  Referral; Future    Right knee pain, unspecified chronicity  -     Orthopedic  Referral  -     Physical Therapy  Referral; Future    Maltracking of right patella  -     Physical Therapy  Referral; Future    Patient presents with right knee pain which suspected related to multifactorial processes.  On exam she has some patellar maltracking which could be the result of weak quadricep muscles.  She also had some pain and tightness of her hamstrings particular on the medial aspect as it inserts at the pes anserine.  Recommend that she do some physical therapy to work on muscle balancing and activation.  Ligaments are stable on exam.  Suspect that length of instability are due to muscle inactivation.         Tripp Werner MD  [facility]    -----------------------------------------------------------------------------------------      SUBJECTIVE  Cindy Beltrán is a/an 69 year old. 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     Reason for Visit:  Injured/painful/body part: Right knee pain  Date of injury/Onset: 6 months ago  Cause: Activity, Running, Walking  What are your symptoms: Dull/achy pain, weakness of lower extremity, moments of instability  What makes it better: RICE  What makes it worse: Activity, Running, Walking  What have you done for this problem (meds, topicals, ice/heat,injections, therapy): Tylenol, Ibuprofen   History of similar pain/ previous surgeries: Yes: Discectomy 8 years ago    Social History/Occupation: Retired Respiratory Therapist       REVIEW OF SYSTEMS:  Positive ROS was noted in the HPI, otherwise negative.       OBJECTIVE:  /80   LMP  (LMP Unknown)    Knee Exam:  -Examination limited to the R  knee    Inspection:  -Knee appeared grossly normal  -No open wounds or rashes appreciated    TTP:  -Fibular head: negative  -Patellar poles: negative  -Patellar facets: positive  -Tibial tuberosity: negative  -Pes Anserine: positive  -Medial joint line: positive  -Lateral joint line: negative  -MCL: negative  -LCL: negative  -IT band insertion: negative  -Medial and lateral hamstring muscle insertions: positive    ROM:   Active  -Flexion:120  -Extension: 0  Crepitus: present    Strength:  Knee flexion: 5/5  Knee extension: 5/5  Sensation:  Sensation throughout the knee was grossly normal    Special Testing:  Lachman: negative  Anterior drawer: negative  Posterior drawer: negative  MCL testing at 0 : negative; At 30: negative  LCL testing at 0: negative; At 30: negative  Michael: negative  Thessaly: negative  Michael: not done  Dial test: not done  Patellar Grind: positive  Apprehension: negative      RADIOLOGY:  Previous imaging reviewed and listed are the impressions: Minimal degenerative changes in the tricompartmental area.  Mild effusion.

## 2024-08-26 ENCOUNTER — OFFICE VISIT (OUTPATIENT)
Dept: ORTHOPEDICS | Facility: CLINIC | Age: 69
End: 2024-08-26
Attending: INTERNAL MEDICINE
Payer: COMMERCIAL

## 2024-08-26 VITALS — DIASTOLIC BLOOD PRESSURE: 80 MMHG | SYSTOLIC BLOOD PRESSURE: 120 MMHG

## 2024-08-26 DIAGNOSIS — M25.561 RIGHT KNEE PAIN, UNSPECIFIED CHRONICITY: ICD-10-CM

## 2024-08-26 DIAGNOSIS — S76.311A STRAIN OF RIGHT HAMSTRING, INITIAL ENCOUNTER: Primary | ICD-10-CM

## 2024-08-26 DIAGNOSIS — M22.8X1 MALTRACKING OF RIGHT PATELLA: ICD-10-CM

## 2024-08-26 PROCEDURE — 99203 OFFICE O/P NEW LOW 30 MIN: CPT | Performed by: STUDENT IN AN ORGANIZED HEALTH CARE EDUCATION/TRAINING PROGRAM

## 2024-08-26 NOTE — LETTER
8/26/2024      Cindy Beltrán  [address] [place] [address]      Dear Colleague,    Thank you for referring your patient, Cindy Beltrán, to the [facility] [place]. Please see a copy of my visit note below.    ASSESSMENT & PLAN    Remedios was seen today for pain.    Diagnoses and all orders for this visit:    Strain of right hamstring, initial encounter  -     Physical Therapy  Referral; Future    Right knee pain, unspecified chronicity  -     Orthopedic  Referral  -     Physical Therapy  Referral; Future    Maltracking of right patella  -     Physical Therapy  Referral; Future    Patient presents with right knee pain which suspected related to multifactorial processes.  On exam she has some patellar maltracking which could be the result of weak quadricep muscles.  She also had some pain and tightness of her hamstrings particular on the medial aspect as it inserts at the pes anserine.  Recommend that she do some physical therapy to work on muscle balancing and activation.  Ligaments are stable on exam.  Suspect that length of instability are due to muscle inactivation.         Tripp Werner MD  [facility]IC[facility]    -----------------------------------------------------------------------------------------      SUBJECTIVE  Cindy Beltrán is a/an 69 year old. 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     Reason for Visit:  Injured/painful/body part: Right knee pain  Date of injury/Onset: 6 months ago  Cause: Activity, Running, Walking  What are your symptoms: Dull/achy pain, weakness of lower extremity, moments of instability  What makes it better: RICE  What makes it worse: Activity, Running, Walking  What have you done for this problem (meds, topicals, ice/heat,injections, therapy): Tylenol, Ibuprofen   History of similar pain/ previous surgeries:  Yes: Discectomy 8 years ago    Social History/Occupation: Retired Respiratory Therapist       REVIEW OF SYSTEMS:  Positive ROS was noted in the HPI, otherwise negative.       OBJECTIVE:  /80   LMP  (LMP Unknown)    Knee Exam:  -Examination limited to the R knee    Inspection:  -Knee appeared grossly normal  -No open wounds or rashes appreciated    TTP:  -Fibular head: negative  -Patellar poles: negative  -Patellar facets: positive  -Tibial tuberosity: negative  -Pes Anserine: positive  -Medial joint line: positive  -Lateral joint line: negative  -MCL: negative  -LCL: negative  -IT band insertion: negative  -Medial and lateral hamstring muscle insertions: positive    ROM:   Active  -Flexion:120  -Extension: 0  Crepitus: present    Strength:  Knee flexion: 5/5  Knee extension: 5/5  Sensation:  Sensation throughout the knee was grossly normal    Special Testing:  Lachman: negative  Anterior drawer: negative  Posterior drawer: negative  MCL testing at 0 : negative; At 30: negative  LCL testing at 0: negative; At 30: negative  Michael: negative  Thessaly: negative  Michael: not done  Dial test: not done  Patellar Grind: positive  Apprehension: negative      RADIOLOGY:  Previous imaging reviewed and listed are the impressions: Minimal degenerative changes in the tricompartmental area.  Mild effusion.      Again, thank you for allowing me to participate in the care of your patient.        Sincerely,        Tripp Werner MD

## 2024-08-26 NOTE — PATIENT INSTRUCTIONS
Thanks for coming in today! During the visit we talked about:     1) Schedule PT    2) Limit Actions by pain, okay to run/bike as tolerated    3) Place an ice pack (you can use a bag of frozen vegetables or other commercial products) over the injured area.  To avoid tissue injury from the cold, place a cloth or towel between the ice and the skin.  It is recommended that ice can be applied 3-5 times a day for up to 20 minutes at a time during the first 24 to 72 hours.  Can repeat every 2 hours as needed.      Let me know if you have any questions or concerns. You can send a oboxo message or call the clinic.     Tripp Werner MD, MPH

## 2024-09-02 DIAGNOSIS — E78.2 MIXED HYPERLIPIDEMIA: ICD-10-CM

## 2024-09-03 DIAGNOSIS — E78.2 MIXED HYPERLIPIDEMIA: Primary | ICD-10-CM

## 2024-09-03 RX ORDER — ATORVASTATIN CALCIUM 20 MG/1
20 TABLET, FILM COATED ORAL DAILY
Qty: 90 TABLET | Refills: 3 | OUTPATIENT
Start: 2024-09-03

## 2024-09-03 RX ORDER — ATORVASTATIN CALCIUM 20 MG/1
20 TABLET, FILM COATED ORAL DAILY
Qty: 30 TABLET | Refills: 0 | Status: SHIPPED | OUTPATIENT
Start: 2024-09-03 | End: 2024-10-03

## 2024-09-03 NOTE — TELEPHONE ENCOUNTER
This refill is a duplicate request, previously received or sent.   Sent denial notification to pharmacy.    Razia Celis RN, BSN, PHN  [facility]

## 2024-09-04 RX ORDER — ATORVASTATIN CALCIUM 20 MG/1
20 TABLET, FILM COATED ORAL DAILY
Qty: 90 TABLET | Refills: 3 | OUTPATIENT
Start: 2024-09-04

## 2024-09-05 DIAGNOSIS — G47.00 PERSISTENT INSOMNIA: ICD-10-CM

## 2024-09-05 RX ORDER — TRAZODONE HYDROCHLORIDE 100 MG/1
100 TABLET ORAL AT BEDTIME
Qty: 90 TABLET | Refills: 2 | Status: SHIPPED | OUTPATIENT
Start: 2024-09-05

## 2024-10-31 ENCOUNTER — MYC REFILL (OUTPATIENT)
Dept: FAMILY MEDICINE | Facility: CLINIC | Age: 69
End: 2024-10-31
Payer: COMMERCIAL

## 2024-10-31 DIAGNOSIS — F98.8 ATTENTION DEFICIT DISORDER (ADD) WITHOUT HYPERACTIVITY: ICD-10-CM

## 2024-10-31 RX ORDER — METHYLPHENIDATE HYDROCHLORIDE 54 MG/1
54 TABLET ORAL EVERY MORNING
Qty: 30 TABLET | Refills: 0 | Status: SHIPPED | OUTPATIENT
Start: 2024-11-01

## 2024-11-01 NOTE — TELEPHONE ENCOUNTER
Diagnosis: 1) ADHD 2) insomnia 3) chronic pain  Medication : 1) Methylphenidate 54 mg CR 2) zolpidem 10 mg 3) pregabalin 200 mg tid  Quantity per month: 1) 30) 30 for 3 months 3) 90  Number of refills before follow up visit: 6 months - may be by lissett  CSA: 6/4/24  Utox: 6/4/24       Last visit 6/4/24  Next scheduled 11/12/24    10/02/2024 06/05/2024 1 Methylphenidate Er 54 Mg Tab 30.00 30 Ma Win [id number] [facility] ([facility]605) 0/0  [place]   09/03/2024 06/05/2024 1 Methylphenidate Er 54 Mg Tab 30.00 30 Ma Win [id number] [facility] ([facility]   07/04/2024 06/05/2024 1 Methylphenidate Er 54 Mg Tab 30.00 30 Ma Win [id number] [facility] ([facility]605[place] 0/[place]   05/30/2024 05/27/2024 1 Methylphenidate Er 54 Mg Tab 30.00 30 Ma Win [id number] [facility] ([facility]605[place] 0/0  [place]

## 2024-11-09 SDOH — HEALTH STABILITY: PHYSICAL HEALTH: ON AVERAGE, HOW MANY MINUTES DO YOU ENGAGE IN EXERCISE AT THIS LEVEL?: 60 MIN

## 2024-11-09 SDOH — HEALTH STABILITY: PHYSICAL HEALTH: ON AVERAGE, HOW MANY DAYS PER WEEK DO YOU ENGAGE IN MODERATE TO STRENUOUS EXERCISE (LIKE A BRISK WALK)?: 6 DAYS

## 2024-11-09 ASSESSMENT — SOCIAL DETERMINANTS OF HEALTH (SDOH): HOW OFTEN DO YOU GET TOGETHER WITH FRIENDS OR RELATIVES?: ONCE A WEEK

## 2024-11-12 ENCOUNTER — OFFICE VISIT (OUTPATIENT)
Dept: FAMILY MEDICINE | Facility: CLINIC | Age: 69
End: 2024-11-12
Payer: COMMERCIAL

## 2024-11-12 VITALS
SYSTOLIC BLOOD PRESSURE: 126 MMHG | BODY MASS INDEX: 19.34 KG/M2 | HEART RATE: 72 BPM | RESPIRATION RATE: 17 BRPM | DIASTOLIC BLOOD PRESSURE: 78 MMHG | TEMPERATURE: 98.1 F | HEIGHT: 64 IN | OXYGEN SATURATION: 100 % | WEIGHT: 113.3 LBS

## 2024-11-12 DIAGNOSIS — Z79.899 CONTROLLED SUBSTANCE AGREEMENT SIGNED: ICD-10-CM

## 2024-11-12 DIAGNOSIS — M81.0 AGE-RELATED OSTEOPOROSIS WITHOUT CURRENT PATHOLOGICAL FRACTURE: ICD-10-CM

## 2024-11-12 DIAGNOSIS — Z00.00 ROUTINE GENERAL MEDICAL EXAMINATION AT A HEALTH CARE FACILITY: Primary | ICD-10-CM

## 2024-11-12 DIAGNOSIS — I25.84 CORONARY ATHEROSCLEROSIS DUE TO CALCIFIED CORONARY LESION: ICD-10-CM

## 2024-11-12 DIAGNOSIS — F10.21 ALCOHOL DEPENDENCE IN REMISSION (H): ICD-10-CM

## 2024-11-12 DIAGNOSIS — Z12.31 VISIT FOR SCREENING MAMMOGRAM: ICD-10-CM

## 2024-11-12 DIAGNOSIS — I25.10 CORONARY ATHEROSCLEROSIS DUE TO CALCIFIED CORONARY LESION: ICD-10-CM

## 2024-11-12 DIAGNOSIS — M54.12 CERVICAL NEURALGIA: ICD-10-CM

## 2024-11-12 DIAGNOSIS — I50.32 CHRONIC DIASTOLIC CHF (CONGESTIVE HEART FAILURE), NYHA CLASS 2 (H): ICD-10-CM

## 2024-11-12 DIAGNOSIS — E78.2 MIXED HYPERLIPIDEMIA: ICD-10-CM

## 2024-11-12 LAB — CREAT UR-MCNC: 25 MG/DL

## 2024-11-12 PROCEDURE — 90471 IMMUNIZATION ADMIN: CPT | Performed by: FAMILY MEDICINE

## 2024-11-12 PROCEDURE — 90662 IIV NO PRSV INCREASED AG IM: CPT | Performed by: FAMILY MEDICINE

## 2024-11-12 PROCEDURE — 90480 ADMN SARSCOV2 VAC 1/ONLY CMP: CPT | Performed by: FAMILY MEDICINE

## 2024-11-12 PROCEDURE — 99397 PER PM REEVAL EST PAT 65+ YR: CPT | Mod: 25 | Performed by: FAMILY MEDICINE

## 2024-11-12 PROCEDURE — 90472 IMMUNIZATION ADMIN EACH ADD: CPT | Performed by: FAMILY MEDICINE

## 2024-11-12 PROCEDURE — 91320 SARSCV2 VAC 30MCG TRS-SUC IM: CPT | Performed by: FAMILY MEDICINE

## 2024-11-12 PROCEDURE — 99214 OFFICE O/P EST MOD 30 MIN: CPT | Mod: 25 | Performed by: FAMILY MEDICINE

## 2024-11-12 PROCEDURE — 90677 PCV20 VACCINE IM: CPT | Performed by: FAMILY MEDICINE

## 2024-11-12 RX ORDER — ATORVASTATIN CALCIUM 20 MG/1
20 TABLET, FILM COATED ORAL DAILY
Qty: 90 TABLET | Refills: 3 | Status: SHIPPED | OUTPATIENT
Start: 2024-11-12

## 2024-11-12 RX ORDER — PREGABALIN 200 MG/1
CAPSULE ORAL
Qty: 90 CAPSULE | Refills: 1 | Status: SHIPPED | OUTPATIENT
Start: 2024-11-12

## 2024-11-12 ASSESSMENT — PAIN SCALES - GENERAL: PAINLEVEL_OUTOF10: NO PAIN (0)

## 2024-11-12 NOTE — PATIENT INSTRUCTIONS
Patient Education   Preventive Care Advice   This is general advice given by our system to help you stay healthy. However, your care team may have specific advice just for you. Please talk to your care team about your preventive care needs.  Nutrition  Eat 5 or more servings of fruits and vegetables each day.  Try wheat bread, brown rice and whole grain pasta (instead of white bread, rice, and pasta).  Get enough calcium and vitamin D. Check the label on foods and aim for 100% of the RDA (recommended daily allowance).  Lifestyle  Exercise at least 150 minutes each week  (30 minutes a day, 5 days a week).  Do muscle strengthening activities 2 days a week. These help control your weight and prevent disease.  No smoking.  Wear sunscreen to prevent skin cancer.  Have a dental exam and cleaning every 6 months.  Yearly exams  See your health care team every year to talk about:  Any changes in your health.  Any medicines your care team has prescribed.  Preventive care, family planning, and ways to prevent chronic diseases.  Shots (vaccines)   HPV shots (up to age 26), if you've never had them before.  Hepatitis B shots (up to age 59), if you've never had them before.  COVID-19 shot: Get this shot when it's due.  Flu shot: Get a flu shot every year.  Tetanus shot: Get a tetanus shot every 10 years.  Pneumococcal, hepatitis A, and RSV shots: Ask your care team if you need these based on your risk.  Shingles shot (for age 50 and up)  General health tests  Diabetes screening:  Starting at age 35, Get screened for diabetes at least every 3 years.  If you are younger than age 35, ask your care team if you should be screened for diabetes.  Cholesterol test: At age 39, start having a cholesterol test every 5 years, or more often if advised.  Bone density scan (DEXA): At age 50, ask your care team if you should have this scan for osteoporosis (brittle bones).  Hepatitis C: Get tested at least once in your life.  STIs (sexually  transmitted infections)  Before age 24: Ask your care team if you should be screened for STIs.  After age 24: Get screened for STIs if you're at risk. You are at risk for STIs (including HIV) if:  You are sexually active with more than one person.  You don't use condoms every time.  You or a partner was diagnosed with a sexually transmitted infection.  If you are at risk for HIV, ask about PrEP medicine to prevent HIV.  Get tested for HIV at least once in your life, whether you are at risk for HIV or not.  Cancer screening tests  Cervical cancer screening: If you have a cervix, begin getting regular cervical cancer screening tests starting at age 21.  Breast cancer scan (mammogram): If you've ever had breasts, begin having regular mammograms starting at age 40. This is a scan to check for breast cancer.  Colon cancer screening: It is important to start screening for colon cancer at age 45.  Have a colonoscopy test every 10 years (or more often if you're at risk) Or, ask your provider about stool tests like a FIT test every year or Cologuard test every 3 years.  To learn more about your testing options, visit:   .  For help making a decision, visit:   https://bit.ly/sy74356.  Prostate cancer screening test: If you have a prostate, ask your care team if a prostate cancer screening test (PSA) at age 55 is right for you.  Lung cancer screening: If you are a current or former smoker ages 50 to 80, ask your care team if ongoing lung cancer screenings are right for you.  For informational purposes only. Not to replace the advice of your health care provider. Copyright   2023 [employer]. All rights reserved. Clinically reviewed by the [facility] Transitions Program. [employer] [id number] - REV 01/24.

## 2024-11-12 NOTE — PROGRESS NOTES
Preventive Care Visit  [facility]  Avril Hernandez MD, Family Medicine  Nov 12, 2024      Assessment & Plan     Routine general medical examination at a health care facility    Chronic diastolic CHF (congestive heart failure), NYHA class 2 (H)  Asymptomatic, I can refill carvedilol when due   monitor with  - CBC with Platelets  - Comprehensive metabolic panel (BMP + Alb, Alk Phos, ALT, AST, Total. Bili, TP)    Coronary atherosclerosis due to calcified coronary lesion  Mixed hyperlipidemia  - Lipid panel reflex to direct LDL Non-fasting  - atorvastatin (LIPITOR) 20 MG tablet  Dispense: 90 tablet; Refill: 3  - Ferritin    Age-related osteoporosis without current pathological fracture  - DX Bone Density  -Reclast infusion overdue and ordered    Cervical neuralgia  Continue  - Pregabalin (LYRICA) 200 MG capsule  Dispense: 90 capsule; Refill: 1    Controlled substance agreement signed  For pregabalin and Concerta  - Drug Confirmation Panel Urine with Creat - lab collect    Visit for screening mammogram  - [place] Screening Bilateral w/ Vinny    Previous history of alcohol dependence = in remission for years, no issues    Return in about 6 months (around 5/12/2025) for may be evisit for ADHD.          Counseling  Appropriate preventive services were addressed with this patient via screening, questionnaire, or discussion as appropriate for fall prevention, nutrition, physical activity, .  Checklist reviewing preventive services available has been given to the patient.  Reviewed patient's diet, addressing concerns and/or questions.           Ricky Whittaker is a 69 year old, presenting for the following:  Annual Visit (ANNUAL PHYSICAL. )        11/12/2024    10:11 AM   Additional Questions   Roomed by Milli SHIELDS    Remedios feels great!    Social History:   Father passed this past fall, Remedios and her siblings were present- Remedios insisted on hospice (rather than more aggressive) care. She Will  be going  with her  Kofi to [place] for [place]  to visit his family    Chronic diastolic CHF-hasn't seen cardiology in years, no concerning symptoms : no edema , dyspnea or chest pain    Insomnia - Uses trazodone nightly, rarely uses zolpidem, has some left but not requesting a new prescription    Cervical neuralgia - symptoms controlled on pregabalin    ADHD - controlled on Concerta 54 mg daily     controlled substance agreement  - signed today for the two above diagnoses/mediations    Restless legs?  - Sometimes at night legs feel tinlgy and she has to move them    Osteoporosis - overdue for relcast infusion (last in 1/2023)   - order placed      Health Care Directive  Patient does not have a Health Care Directive: Discussed advance care planning with patient; information given to patient to review.      11/9/2024   General Health   How would you rate your overall physical health? Excellent   Feel stress (tense, anxious, or unable to sleep) Not at all            11/9/2024   Nutrition   Diet: Regular (no restrictions)            11/9/2024   Exercise   Days per week of moderate/strenuous exercise 6 days   Average minutes spent exercising at this level 60 min            11/9/2024   Social Factors   Frequency of gathering with friends or relatives Once a week   Worry food won't last until get money to buy more No   Food not last or not have enough money for food? No   Do you have housing? (Housing is defined as stable permanent housing and does not include staying outside in a car, in a tent, in an abandoned building, in an overnight shelter, or couch-surfing.) Yes   Are you worried about losing your housing? No   Lack of transportation? No   Unable to get utilities (heat,electricity)? No            11/12/2024   Fall Risk   Fallen 2 or more times in the past year? No    Trouble with walking or balance? No        Patient-reported          11/9/2024   Activities of Daily Living- Home Safety   Needs help with the  following daily activities None of the above   Safety concerns in the home None of the above            11/9/2024   Dental   Dentist two times every year? Yes            11/9/2024   Hearing Screening   Hearing concerns? None of the above            11/9/2024   Driving Risk Screening   Patient/family members have concerns about driving No            11/9/2024   General Alertness/Fatigue Screening   Have you been more tired than usual lately? No            11/9/2024   Urinary Incontinence Screening   Bothered by leaking urine in past 6 months No            11/9/2024   TB Screening   Were you born outside of the [place]? No            Today's PHQ-2 Score:       11/12/2024     9:48 AM   PHQ-2 ( 1999 [employer])   Q1: Little interest or pleasure in doing things 0    Q2: Feeling down, depressed or hopeless 0    PHQ-2 Score 0    Q1: Little interest or pleasure in doing things Not at all   Q2: Feeling down, depressed or hopeless Not at all   PHQ-2 Score 0       Patient-reported           11/9/2024   Substance Use   Alcohol more than 3/day or more than 7/wk Not Applicable   Do you have a current opioid prescription? No   How severe/bad is pain from 1 to 10? 3/10   Do you use any other substances recreationally? No        Social History     Tobacco Use    Smoking status: Never     Passive exposure: Never    Smokeless tobacco: Never   Vaping Use    Vaping status: Never Used   Substance Use Topics    Alcohol use: No     Alcohol/week: 0.0 standard drinks of alcohol     Comment: Alcoholic Drinks/day: stop drinking in [place]Ct 2004    Drug use: No           10/1/2023   LAST FHS-7 RESULTS   1st degree relative breast or ovarian cancer Yes    Any relative bilateral breast cancer Unknown    Any male have breast cancer No    Any ONE woman have BOTH breast AND ovarian cancer Unknown    Any woman with breast cancer before 50yrs No    2 or more relatives with breast AND/OR ovarian cancer Yes    2 or more relatives with breast AND/OR bowel cancer Yes         Patient-reported     Previously referred to genetic counseling    Mammogram Screening - Mammogram every 1-2 years updated in Health Maintenance based on mutual decision making      History of abnormal Pap smear: No - age 65 or older with adequate negative prior screening test results (3 consecutive negative cytology results, 2 consecutive negative cotesting results, or 2 consecutive negative HrHPV test results within 10 years, with the most recent test occurring within the recommended screening interval for the test used)        Latest Ref Rng & Units 3/29/2022    11:20 AM 3/2/2021     4:26 PM 6/17/2016    10:30 AM   PAP / HPV   PAP  Negative for Intraepithelial Lesion or Malignancy (NILM)  Unsatisfactory for evaluation  Electronically signed by Tiny Butterfield CT (ASCP) on 3/8/2021 at 11:13 AM    Negative for squamous intraepithelial lesion or malignancy  Electronically signed by Magdalena Hernandez CT (ASCP) on 6/23/2016 at  3:58 PM      HPV 16 DNA Negative Negative  Negative     HPV 18 DNA Negative Negative  Negative     Other HR HPV Negative Negative  Negative       ASCVD Risk   The 10-year ASCVD risk score (Michelle DK, et al., 2019) is: 7%    Values used to calculate the score:      Age: 69 years      Sex: Female      Is Non- : No      Diabetic: No      Tobacco smoker: No      Systolic Blood Pressure: 126 mmHg      Is BP treated: No      HDL Cholesterol: 70 mg/dL      Total Cholesterol: 201 mg/dL            Reviewed and updated as needed this visit by Provider                      Current providers sharing in care for this patient include:  Patient Care Team:  Avril Hernandez MD as PCP - Te Garner MD as MD (Orthopedics)  Tripp Clifton MD as Referring Physician (Orthopedics)  Cherelle Graham, PharmD as Pharmacist (Pharmacist)  Avril Hernandez MD as Assigned PCP    The following health maintenance items are reviewed in Epic and correct as of  "today:  Health Maintenance   Topic Date Due    ZOSTER IMMUNIZATION (1 of 2) Never done    RSV VACCINE (1 - Risk 60-74 years 1-dose series) Never done    Pneumococcal Vaccine: 65+ Years (2 of 2 - PCV) 11/11/2022    ANNUAL REVIEW OF HM ORDERS  03/29/2023    DEXA  06/10/2023    ALT  06/17/2023    BMP  06/17/2023    CBC  06/17/2023    LIPID  12/13/2023    INFLUENZA VACCINE (1) 09/01/2024    COVID-19 Vaccine (6 - 2024-25 season) 09/01/2024    MEDICARE ANNUAL WELLNESS VISIT  10/06/2024    MAMMO SCREENING  09/29/2024    HF ACTION PLAN  11/14/2024    GLUCOSE  06/17/2025    FALL RISK ASSESSMENT  11/12/2025    DTAP/TDAP/TD IMMUNIZATION (3 - Td or Tdap) 06/17/2026    ADVANCE CARE PLANNING  10/06/2028    COLORECTAL CANCER SCREENING  01/16/2030    TSH W/FREE T4 REFLEX  Completed    HEPATITIS C SCREENING  Completed    PHQ-2 (once per calendar year)  Completed    PAP FOLLOW-UP  Completed    HPV IMMUNIZATION  Aged Out    MENINGITIS IMMUNIZATION  Aged Out    RSV MONOCLONAL ANTIBODY  Aged Out    PAP  Discontinued            Objective    Exam  /78 (BP Location: Left arm, Cuff Size: Adult Regular)   Pulse 72   Temp 98.1  F (36.7  C) (Tympanic)   Resp 17   Ht 1.626 m (5' 4\")   Wt 51.4 kg (113 lb 4.8 oz)   LMP  (LMP Unknown)   SpO2 100%   BMI 19.45 kg/m     Estimated body mass index is 19.45 kg/m  as calculated from the following:    Height as of this encounter: 1.626 m (5' 4\").    Weight as of this encounter: 51.4 kg (113 lb 4.8 oz).    Physical Exam  General appearance - alert, well appearing, and in no distress  Mental status - normal mood, behavior, speech, dress, motor activity, and thought processes  Eyes -deferred - upcoming eye visit  Ears - bilateral TM's and external ear canals normal  Mouth - mucous membranes moist, pharynx normal without lesions  Neck - supple, no significant adenopathy, carotids upstroke normal bilaterally, no bruits, thyroid exam: thyroid is normal in size without nodules or tenderness  Chest " - clear to auscultation, no wheezes, rales or rhonchi, symmetric air entry  Heart - normal rate, regular rhythm, normal S1, S2, no murmurs, rubs, clicks or gallops  Abdomen - soft, nontender, nondistended, no masses or organomegaly  Breasts - breasts appear normal, no suspicious masses, no skin or nipple changes or axillary nodes  Neurological - alert, oriented, normal speech, no focal findings or movement disorder noted, DTR's normal and symmetric  Extremities - peripheral pulses normal, no pedal edema, no clubbing or cyanosis  Skin - no rashes or worrisome lesions          11/12/2024   Mini Cog   Clock Draw Score 2 Normal   3 Item Recall 3 objects recalled   Mini Cog Total Score 5                Signed Electronically by: Avril Hernandez MD

## 2024-11-13 ENCOUNTER — PATIENT OUTREACH (OUTPATIENT)
Dept: CARE COORDINATION | Facility: CLINIC | Age: 69
End: 2024-11-13
Payer: COMMERCIAL

## 2024-11-13 RX ORDER — ALBUTEROL SULFATE 90 UG/1
1-2 INHALANT RESPIRATORY (INHALATION)
Start: 2024-11-13

## 2024-11-13 RX ORDER — DIPHENHYDRAMINE HYDROCHLORIDE 50 MG/ML
50 INJECTION INTRAMUSCULAR; INTRAVENOUS
Start: 2024-11-13

## 2024-11-13 RX ORDER — EPINEPHRINE 1 MG/ML
0.3 INJECTION, SOLUTION, CONCENTRATE INTRAVENOUS EVERY 5 MIN PRN
OUTPATIENT
Start: 2024-11-13

## 2024-11-13 RX ORDER — HEPARIN SODIUM (PORCINE) LOCK FLUSH IV SOLN 100 UNIT/ML 100 UNIT/ML
5 SOLUTION INTRAVENOUS
OUTPATIENT
Start: 2024-11-13

## 2024-11-13 RX ORDER — ZOLEDRONIC ACID 0.05 MG/ML
5 INJECTION, SOLUTION INTRAVENOUS ONCE
Start: 2024-11-13

## 2024-11-13 RX ORDER — ALBUTEROL SULFATE 0.83 MG/ML
2.5 SOLUTION RESPIRATORY (INHALATION)
OUTPATIENT
Start: 2024-11-13

## 2024-11-13 RX ORDER — HEPARIN SODIUM,PORCINE 10 UNIT/ML
5 VIAL (ML) INTRAVENOUS
OUTPATIENT
Start: 2024-11-13

## 2024-11-13 RX ORDER — MEPERIDINE HYDROCHLORIDE 25 MG/ML
25 INJECTION INTRAMUSCULAR; INTRAVENOUS; SUBCUTANEOUS EVERY 30 MIN PRN
OUTPATIENT
Start: 2024-11-13

## 2024-11-13 RX ORDER — ACETAMINOPHEN 325 MG/1
650 TABLET ORAL ONCE
OUTPATIENT
Start: 2024-11-13

## 2024-11-13 RX ORDER — METHYLPREDNISOLONE SODIUM SUCCINATE 125 MG/2ML
125 INJECTION INTRAMUSCULAR; INTRAVENOUS
Start: 2024-11-13

## 2024-11-15 LAB
ME-PHENIDATE UR CFM-MCNC: 3600 NG/ML
ME-PHENIDATE/CREAT UR: ABNORMAL NG/MG {CREAT}
PREGABALIN UR QL CFM: PRESENT

## 2024-12-10 ENCOUNTER — LAB (OUTPATIENT)
Dept: LAB | Facility: CLINIC | Age: 69
End: 2024-12-10
Payer: COMMERCIAL

## 2024-12-10 ENCOUNTER — MYC REFILL (OUTPATIENT)
Dept: FAMILY MEDICINE | Facility: CLINIC | Age: 69
End: 2024-12-10

## 2024-12-10 ENCOUNTER — ANCILLARY PROCEDURE (OUTPATIENT)
Dept: MAMMOGRAPHY | Facility: CLINIC | Age: 69
End: 2024-12-10
Attending: FAMILY MEDICINE
Payer: COMMERCIAL

## 2024-12-10 DIAGNOSIS — I50.32 CHRONIC DIASTOLIC CHF (CONGESTIVE HEART FAILURE), NYHA CLASS 2 (H): ICD-10-CM

## 2024-12-10 DIAGNOSIS — E78.2 MIXED HYPERLIPIDEMIA: ICD-10-CM

## 2024-12-10 DIAGNOSIS — Z12.31 VISIT FOR SCREENING MAMMOGRAM: ICD-10-CM

## 2024-12-10 DIAGNOSIS — I25.84 CORONARY ATHEROSCLEROSIS DUE TO CALCIFIED CORONARY LESION: ICD-10-CM

## 2024-12-10 DIAGNOSIS — I25.10 CORONARY ATHEROSCLEROSIS DUE TO CALCIFIED CORONARY LESION: ICD-10-CM

## 2024-12-10 DIAGNOSIS — R73.01 ELEVATED FASTING GLUCOSE: ICD-10-CM

## 2024-12-10 DIAGNOSIS — F98.8 ATTENTION DEFICIT DISORDER (ADD) WITHOUT HYPERACTIVITY: ICD-10-CM

## 2024-12-10 LAB
ALBUMIN SERPL BCG-MCNC: 4.4 G/DL (ref 3.5–5.2)
ALP SERPL-CCNC: 45 U/L (ref 40–150)
ALT SERPL W P-5'-P-CCNC: 22 U/L (ref 0–50)
ANION GAP SERPL CALCULATED.3IONS-SCNC: 10 MMOL/L (ref 7–15)
AST SERPL W P-5'-P-CCNC: 19 U/L (ref 0–45)
BILIRUB SERPL-MCNC: 0.9 MG/DL
BUN SERPL-MCNC: 16.7 MG/DL (ref 8–23)
CALCIUM SERPL-MCNC: 9.3 MG/DL (ref 8.8–10.4)
CHLORIDE SERPL-SCNC: 105 MMOL/L (ref 98–107)
CHOLEST SERPL-MCNC: 186 MG/DL
CREAT SERPL-MCNC: 0.77 MG/DL (ref 0.51–0.95)
EGFRCR SERPLBLD CKD-EPI 2021: 84 ML/MIN/1.73M2
ERYTHROCYTE [DISTWIDTH] IN BLOOD BY AUTOMATED COUNT: 13 % (ref 10–15)
FASTING STATUS PATIENT QL REPORTED: YES
FASTING STATUS PATIENT QL REPORTED: YES
FERRITIN SERPL-MCNC: 55 NG/ML (ref 11–328)
GLUCOSE SERPL-MCNC: 109 MG/DL (ref 70–99)
HCO3 SERPL-SCNC: 26 MMOL/L (ref 22–29)
HCT VFR BLD AUTO: 40.5 % (ref 35–47)
HDLC SERPL-MCNC: 86 MG/DL
HGB BLD-MCNC: 13.3 G/DL (ref 11.7–15.7)
LDLC SERPL CALC-MCNC: 87 MG/DL
MCH RBC QN AUTO: 31.1 PG (ref 26.5–33)
MCHC RBC AUTO-ENTMCNC: 32.8 G/DL (ref 31.5–36.5)
MCV RBC AUTO: 95 FL (ref 78–100)
NONHDLC SERPL-MCNC: 100 MG/DL
PLATELET # BLD AUTO: 209 10E3/UL (ref 150–450)
POTASSIUM SERPL-SCNC: 4.4 MMOL/L (ref 3.4–5.3)
PROT SERPL-MCNC: 6.6 G/DL (ref 6.4–8.3)
RBC # BLD AUTO: 4.28 10E6/UL (ref 3.8–5.2)
SODIUM SERPL-SCNC: 141 MMOL/L (ref 135–145)
TRIGL SERPL-MCNC: 67 MG/DL
WBC # BLD AUTO: 5.9 10E3/UL (ref 4–11)

## 2024-12-10 PROCEDURE — 82728 ASSAY OF FERRITIN: CPT

## 2024-12-10 PROCEDURE — 80061 LIPID PANEL: CPT

## 2024-12-10 PROCEDURE — 83036 HEMOGLOBIN GLYCOSYLATED A1C: CPT

## 2024-12-10 PROCEDURE — 77063 BREAST TOMOSYNTHESIS BI: CPT | Mod: TC | Performed by: RADIOLOGY

## 2024-12-10 PROCEDURE — 77067 SCR MAMMO BI INCL CAD: CPT | Mod: TC | Performed by: RADIOLOGY

## 2024-12-10 PROCEDURE — 85027 COMPLETE CBC AUTOMATED: CPT

## 2024-12-10 PROCEDURE — 36415 COLL VENOUS BLD VENIPUNCTURE: CPT

## 2024-12-10 PROCEDURE — 80053 COMPREHEN METABOLIC PANEL: CPT

## 2024-12-10 RX ORDER — METHYLPHENIDATE HYDROCHLORIDE 54 MG/1
54 TABLET ORAL EVERY MORNING
Qty: 30 TABLET | Refills: 0 | Status: CANCELLED | OUTPATIENT
Start: 2024-12-10

## 2024-12-10 RX ORDER — METHYLPHENIDATE HYDROCHLORIDE 54 MG/1
54 TABLET ORAL EVERY MORNING
Qty: 30 TABLET | Refills: 0 | Status: SHIPPED | OUTPATIENT
Start: 2024-12-10

## 2024-12-10 NOTE — TELEPHONE ENCOUNTER
Diagnosis: 1) ADHD 2) insomnia 3) chronic pain  Medication : 1) Methylphenidate 54 mg CR 2) zolpidem 10 mg 3) pregabalin 200 mg tid  Quantity per month: 1) 30) 30 for 3 months 3) 90  Number of refills before follow up visit: 6 months - may be by lissett  CSA: 6/4/24  Utox: 6/4/24       Last visit 11/12/24    11/01/2024 10/31/2024 1 Methylphenidate Er 54 Mg Tab 30.00 30 Ma Win [id number] [id number] 0/[place]   10/02/2024 06/05/2024 1 Methylphenidate Er 54 Mg Tab 30.00 30 Ma Win [id number] [facility]   09/03/2024 06/05/2024 1 Methylphenidate Er 54 Mg Tab 30.00 30 Ma Win [id number] [facility] ([facility]605[place]   07/04/2024 06/05/2024 1 Methylphenidate Er 54 Mg Tab 30.00 30 Ma Win [id number] [facility] ([facility]

## 2024-12-12 ENCOUNTER — TRANSCRIBE ORDERS (OUTPATIENT)
Dept: FAMILY MEDICINE | Facility: CLINIC | Age: 69
End: 2024-12-12
Payer: COMMERCIAL

## 2024-12-12 DIAGNOSIS — R73.01 ELEVATED FASTING GLUCOSE: Primary | ICD-10-CM

## 2024-12-12 DIAGNOSIS — G25.81 RESTLESS LEGS SYNDROME (RLS): ICD-10-CM

## 2024-12-12 LAB
EST. AVERAGE GLUCOSE BLD GHB EST-MCNC: 105 MG/DL
HBA1C MFR BLD: 5.3 % (ref 0–5.6)

## 2024-12-12 RX ORDER — FERROUS GLUCONATE 324(38)MG
324 TABLET ORAL
Qty: 100 TABLET | Refills: 3 | Status: SHIPPED | OUTPATIENT
Start: 2024-12-12

## 2025-01-08 ENCOUNTER — MYC REFILL (OUTPATIENT)
Dept: FAMILY MEDICINE | Facility: CLINIC | Age: 70
End: 2025-01-08
Payer: COMMERCIAL

## 2025-01-08 DIAGNOSIS — F98.8 ATTENTION DEFICIT DISORDER (ADD) WITHOUT HYPERACTIVITY: ICD-10-CM

## 2025-01-08 DIAGNOSIS — F90.0 ATTENTION DEFICIT HYPERACTIVITY DISORDER (ADHD), PREDOMINANTLY INATTENTIVE TYPE: ICD-10-CM

## 2025-01-08 RX ORDER — METHYLPHENIDATE HYDROCHLORIDE 54 MG/1
54 TABLET ORAL EVERY MORNING
Qty: 30 TABLET | Refills: 0 | Status: CANCELLED | OUTPATIENT
Start: 2025-01-08

## 2025-01-09 RX ORDER — METHYLPHENIDATE HYDROCHLORIDE 54 MG/1
54 TABLET, EXTENDED RELEASE ORAL EVERY MORNING
Qty: 30 TABLET | Refills: 0 | Status: SHIPPED | OUTPATIENT
Start: 2025-01-09

## 2025-01-10 ENCOUNTER — TELEPHONE (OUTPATIENT)
Dept: FAMILY MEDICINE | Facility: CLINIC | Age: 70
End: 2025-01-10
Payer: COMMERCIAL

## 2025-01-10 DIAGNOSIS — F90.0 ATTENTION DEFICIT HYPERACTIVITY DISORDER (ADHD), PREDOMINANTLY INATTENTIVE TYPE: Primary | ICD-10-CM

## 2025-01-10 NOTE — TELEPHONE ENCOUNTER
Please start prior authorization:     Disp Refills Start End MITCH   CONCERTA 54 MG CR tablet 30 tablet 0 1/9/2025 -- Yes   Sig - Route: Take 1 tablet (54 mg) by mouth every morning. BRAND NAME CONCERTA as covered by insruance- not generic - Oral   Sent to pharmacy as: Concerta 54 MG Oral Tablet Extended Release   Class: E-Prescribe   Earliest Fill Date: 1/9/2025   Order: [id number]   E-Prescribing Status: Receipt confirmed by pharmacy (1/9/2025  8:20 PM CST)     Medication Administration Instructions    BRAND NAME CONCERTA as covered by insruance- not generic     Pharmacy    [id number] - [place], [place] - [address] AT [facility] [address]     Associated Diagnoses    Attention deficit hyperactivity disorder (ADHD), predominantly inattentive type [F90.0]              Prescribing Provider's NPI: [id number]  Avril Hernandez

## 2025-01-10 NOTE — TELEPHONE ENCOUNTER
Diagnosis: 1) ADHD 2) insomnia 3) chronic pain  Medication : 1) Methylphenidate 54 mg CR 2) zolpidem 10 mg 3) pregabalin 200 mg tid  Quantity per month: 1) 30) 30 for 3 months 3) 90  Number of refills before follow up visit: 6 months - may be by lissett  CSA: 6/4/24  Utox: 6/4/24       Last visit 11/12/24    12/11/2024 12/10/2024 1 Methylphenidate Er 54 Mg Tab 30.00 30 Ma Win [id number] [facility] ([phone][place]   11/01/2024 10/31/2024 1 Methylphenidate Er 54 Mg Tab 30.00 30 Ma Win [id number] [facility] ([facility]   10/02/2024 06/05/2024 1 Methylphenidate Er 54 Mg Tab 30.00 30 Ma Win [id number] [facility] ([facility]

## 2025-01-14 NOTE — TELEPHONE ENCOUNTER
PA Initiation    Medication: CONCERTA 54 MG PO TBCR  Insurance Company: [email or web address] - Phone [phone] Fax [phone]  Pharmacy Filling the Rx: [id number] - [place], [place] - [facility] AT [facility] [address]  Filling Pharmacy Phone: [phone]  Filling Pharmacy Fax: [phone]  Start Date: 1/13/2025

## 2025-01-15 NOTE — TELEPHONE ENCOUNTER
PRIOR AUTHORIZATION DENIED    Medication: CONCERTA 54 MG PO TBCR    Insurance Company: [employer] - Phone [phone] Fax [phone]    Denial Date: 1/15/2025    Denial Reason(s):         Appeal Information:

## 2025-01-16 RX ORDER — METHYLPHENIDATE HYDROCHLORIDE 54 MG/1
54 TABLET ORAL EVERY MORNING
Qty: 30 TABLET | Refills: 0 | Status: SHIPPED | OUTPATIENT
Start: 2025-02-08

## 2025-01-16 NOTE — TELEPHONE ENCOUNTER
Remedios tinsley told me that brand name was what is covered by insurance, but it appears that is not the case.  She DID pick it up.   I can send next month's prescription as generic post- dated - please confirm that is he wish and send back to me      01/10/2025 01/09/2025 1 Methylphenidate Er 54 Mg Tab 30.00 30 Ma Win [id number] [id number] [place]   01/01/2025 11/12/2024 1 Pregabalin 200 Mg Capsule 90.00 30 Ma Win [id number] [facility] (5605) 0/1 4.02 [place]   12/11/2024 12/10/2024 1 Methylphenidate Er 54 Mg Tab 30.00 30 Ma Win [id number] [facility] (5605[place]   12/01/2024 06/04/2024 1 Pregabalin 200 Mg Capsule 90.00 30 Ma Win [id number] [facility] (5605) 5/5 4.02 [place]   11/01/2024 10/31/2024 1 Methylphenidate Er 54 Mg Tab 30.00 30 Ma Win [id number] [facility] (5605[place] 0/[place]

## 2025-01-16 NOTE — TELEPHONE ENCOUNTER
Spoke with pt. She states she thinks last year insurance covered brand name and now it has changed to generic. [facility] pharmacy was able to dispense generic for her this last time so it was covered. Going forward, needs to be generic.

## 2025-03-09 ENCOUNTER — MYC REFILL (OUTPATIENT)
Dept: FAMILY MEDICINE | Facility: CLINIC | Age: 70
End: 2025-03-09
Payer: COMMERCIAL

## 2025-03-09 DIAGNOSIS — F90.0 ATTENTION DEFICIT HYPERACTIVITY DISORDER (ADHD), PREDOMINANTLY INATTENTIVE TYPE: ICD-10-CM

## 2025-03-09 RX ORDER — METHYLPHENIDATE HYDROCHLORIDE 54 MG/1
54 TABLET ORAL EVERY MORNING
Qty: 30 TABLET | Refills: 0 | Status: CANCELLED | OUTPATIENT
Start: 2025-03-09

## 2025-03-10 RX ORDER — METHYLPHENIDATE HYDROCHLORIDE 54 MG/1
54 TABLET ORAL EVERY MORNING
Qty: 30 TABLET | Refills: 0 | Status: SHIPPED | OUTPATIENT
Start: 2025-03-12 | End: 2025-04-11

## 2025-03-10 NOTE — TELEPHONE ENCOUNTER
Diagnosis: 1) ADHD 2) insomnia 3) chronic pain  Medication : 1) Methylphenidate 54 mg CR 2) zolpidem 10 mg 3) pregabalin 200 mg tid  Quantity per month: 1) 30) 30 for 3 months 3) 90  Number of refills before follow up visit: 6 months - may be by lissett  CSA: 6/4/24  Utox: 6/4/24     Last visit 11/12/24    02/10/2025 01/16/2025 1 Methylphenidate Er 54 Mg Tab 30.00 30 Ma Win [id number] [facility] ([facility]605[place]   01/10/2025 01/09/2025 1 Methylphenidate Er 54 Mg Tab 30.00 30 Ma Win [id number] [facility] (5605[place]   12/11/2024 12/10/2024 1 Methylphenidate Er 54 Mg Tab 30.00 30 Ma Win [id number] [facility] (5605[place]   11/01/2024 10/31/2024 1 Methylphenidate Er 54 Mg Tab 30.00 30 Ma Win [id number] [facility] (5605[place] 0/[place]

## 2025-03-24 ENCOUNTER — LAB (OUTPATIENT)
Dept: INFUSION THERAPY | Facility: HOSPITAL | Age: 70
End: 2025-03-24
Payer: COMMERCIAL

## 2025-03-24 ENCOUNTER — INFUSION THERAPY VISIT (OUTPATIENT)
Dept: INFUSION THERAPY | Facility: HOSPITAL | Age: 70
End: 2025-03-24
Payer: COMMERCIAL

## 2025-03-24 VITALS
BODY MASS INDEX: 19.44 KG/M2 | DIASTOLIC BLOOD PRESSURE: 73 MMHG | HEIGHT: 64 IN | TEMPERATURE: 98 F | WEIGHT: 113.9 LBS | SYSTOLIC BLOOD PRESSURE: 141 MMHG | RESPIRATION RATE: 16 BRPM | OXYGEN SATURATION: 99 % | HEART RATE: 62 BPM

## 2025-03-24 DIAGNOSIS — M81.0 AGE-RELATED OSTEOPOROSIS WITHOUT CURRENT PATHOLOGICAL FRACTURE: Primary | ICD-10-CM

## 2025-03-24 LAB
ALBUMIN SERPL BCG-MCNC: 4.5 G/DL (ref 3.5–5.2)
CALCIUM SERPL-MCNC: 9.8 MG/DL (ref 8.8–10.4)
CREAT SERPL-MCNC: 0.78 MG/DL (ref 0.51–0.95)
EGFRCR SERPLBLD CKD-EPI 2021: 82 ML/MIN/1.73M2

## 2025-03-24 PROCEDURE — 96365 THER/PROPH/DIAG IV INF INIT: CPT

## 2025-03-24 PROCEDURE — 82310 ASSAY OF CALCIUM: CPT | Performed by: FAMILY MEDICINE

## 2025-03-24 PROCEDURE — 250N000011 HC RX IP 250 OP 636: Performed by: FAMILY MEDICINE

## 2025-03-24 PROCEDURE — 250N000013 HC RX MED GY IP 250 OP 250 PS 637: Performed by: FAMILY MEDICINE

## 2025-03-24 PROCEDURE — 82040 ASSAY OF SERUM ALBUMIN: CPT | Performed by: FAMILY MEDICINE

## 2025-03-24 PROCEDURE — 36415 COLL VENOUS BLD VENIPUNCTURE: CPT | Performed by: FAMILY MEDICINE

## 2025-03-24 PROCEDURE — 258N000003 HC RX IP 258 OP 636: Performed by: FAMILY MEDICINE

## 2025-03-24 PROCEDURE — 82565 ASSAY OF CREATININE: CPT | Performed by: FAMILY MEDICINE

## 2025-03-24 RX ORDER — METHYLPREDNISOLONE SODIUM SUCCINATE 125 MG/2ML
125 INJECTION INTRAMUSCULAR; INTRAVENOUS
Status: CANCELLED
Start: 2025-03-24

## 2025-03-24 RX ORDER — ACETAMINOPHEN 325 MG/1
650 TABLET ORAL ONCE
Status: COMPLETED | OUTPATIENT
Start: 2025-03-24 | End: 2025-03-24

## 2025-03-24 RX ORDER — EPINEPHRINE 1 MG/ML
0.3 INJECTION, SOLUTION INTRAMUSCULAR; SUBCUTANEOUS EVERY 5 MIN PRN
Status: CANCELLED | OUTPATIENT
Start: 2025-03-24

## 2025-03-24 RX ORDER — ALBUTEROL SULFATE 0.83 MG/ML
2.5 SOLUTION RESPIRATORY (INHALATION)
Status: CANCELLED | OUTPATIENT
Start: 2025-03-24

## 2025-03-24 RX ORDER — DIPHENHYDRAMINE HYDROCHLORIDE 50 MG/ML
50 INJECTION, SOLUTION INTRAMUSCULAR; INTRAVENOUS
Status: CANCELLED
Start: 2025-03-24

## 2025-03-24 RX ORDER — ACETAMINOPHEN 325 MG/1
650 TABLET ORAL ONCE
Status: CANCELLED | OUTPATIENT
Start: 2025-03-24

## 2025-03-24 RX ORDER — ZOLEDRONIC ACID 0.05 MG/ML
5 INJECTION, SOLUTION INTRAVENOUS ONCE
Status: COMPLETED | OUTPATIENT
Start: 2025-03-24 | End: 2025-03-24

## 2025-03-24 RX ORDER — HEPARIN SODIUM (PORCINE) LOCK FLUSH IV SOLN 100 UNIT/ML 100 UNIT/ML
5 SOLUTION INTRAVENOUS
Status: CANCELLED | OUTPATIENT
Start: 2025-03-24

## 2025-03-24 RX ORDER — MEPERIDINE HYDROCHLORIDE 25 MG/ML
25 INJECTION INTRAMUSCULAR; INTRAVENOUS; SUBCUTANEOUS EVERY 30 MIN PRN
Status: CANCELLED | OUTPATIENT
Start: 2025-03-24

## 2025-03-24 RX ORDER — HEPARIN SODIUM,PORCINE 10 UNIT/ML
5 VIAL (ML) INTRAVENOUS
Status: CANCELLED | OUTPATIENT
Start: 2025-03-24

## 2025-03-24 RX ORDER — HEPARIN SODIUM,PORCINE 10 UNIT/ML
5 VIAL (ML) INTRAVENOUS
Status: DISCONTINUED | OUTPATIENT
Start: 2025-03-24 | End: 2025-03-24

## 2025-03-24 RX ORDER — ALBUTEROL SULFATE 90 UG/1
1-2 INHALANT RESPIRATORY (INHALATION)
Status: CANCELLED
Start: 2025-03-24

## 2025-03-24 RX ORDER — ZOLEDRONIC ACID 0.05 MG/ML
5 INJECTION, SOLUTION INTRAVENOUS ONCE
Status: CANCELLED
Start: 2025-03-24

## 2025-03-24 RX ADMIN — SODIUM CHLORIDE 250 ML: 0.9 INJECTION, SOLUTION INTRAVENOUS at 13:34

## 2025-03-24 RX ADMIN — ZOLEDRONIC ACID 5 MG: 5 INJECTION, SOLUTION INTRAVENOUS at 13:52

## 2025-03-24 RX ADMIN — ACETAMINOPHEN 650 MG: 325 TABLET ORAL at 13:37

## 2025-03-24 NOTE — PROGRESS NOTES
Infusion Nursing Note:  Cindycristy Beltrán presents today for labs and Reclast infusion.    Patient seen by provider today: No   present during visit today: Not Applicable.    Note: Remedios arrived ambulatory after having labs drawn on second floor. Ca today is 9.8 and creat is 0.78. She state she has tolerated Reclast in the past with no side effects. Premedicated with tylenol. Reviewed printed medication information sheet with patient. Reclast infused over 30 minutes followed with NS rinse. Encouraged Remedios to increase her fluid intake for the next few days.       Intravenous Access:  Peripheral IV placed.    Treatment Conditions:  Results reviewed, labs MET treatment parameters, ok to proceed with treatment.      Post Infusion Assessment:  Patient tolerated infusion without incident.  Site patent and intact, free from redness, edema or discomfort.  Access discontinued per protocol.       Discharge Plan:   Patient discharged in stable condition accompanied by: self.  Departure Mode: Ambulatory.      Florina Cabrera RN

## 2025-04-08 ENCOUNTER — MYC REFILL (OUTPATIENT)
Dept: FAMILY MEDICINE | Facility: CLINIC | Age: 70
End: 2025-04-08
Payer: COMMERCIAL

## 2025-04-08 DIAGNOSIS — F90.0 ATTENTION DEFICIT HYPERACTIVITY DISORDER (ADHD), PREDOMINANTLY INATTENTIVE TYPE: ICD-10-CM

## 2025-04-09 DIAGNOSIS — I50.32 CHRONIC DIASTOLIC CHF (CONGESTIVE HEART FAILURE), NYHA CLASS 2 (H): ICD-10-CM

## 2025-04-09 RX ORDER — METHYLPHENIDATE HYDROCHLORIDE 54 MG/1
54 TABLET ORAL EVERY MORNING
Qty: 30 TABLET | Refills: 0 | Status: SHIPPED | OUTPATIENT
Start: 2025-04-11

## 2025-04-09 NOTE — TELEPHONE ENCOUNTER
Last visit 11/ 12/24    Diagnosis: 1) ADHD 2) insomnia 3) chronic pain  Medication : 1) Methylphenidate 54 mg CR 2) zolpidem 10 mg 3) pregabalin 200 mg tid  Quantity per month: 1) 30) 30 for 3 months 3) 90  Number of refills before follow up visit: 6 months - may be by lissett  CSA: 6/4/24  Utox: 6/4/24      03/12/2025 03/10/2025 1 Methylphenidate Er 54 Mg Tab 30.00 30 Ma Win [id number] [facility] (5605[place]   02/10/2025 01/16/2025 1 Methylphenidate Er 54 Mg Tab 30.00 30 Ma Win [id number] [facility] (5605[place]   01/10/2025 01/09/2025 1 Methylphenidate Er 54 Mg Tab 30.00 30 Ma Win [id number] [facility] (5605[place]   12/11/2024 12/10/2024 1 Methylphenidate Er 54 Mg Tab 30.00 30 Ma Win [id number] [facility] (5605[place]

## 2025-04-10 RX ORDER — CARVEDILOL 25 MG/1
25 TABLET ORAL
Qty: 90 TABLET | Refills: 0 | Status: SHIPPED | OUTPATIENT
Start: 2025-04-10

## 2025-04-10 NOTE — TELEPHONE ENCOUNTER
BP Readings from Last 6 Encounters:   03/24/25 (!) 141/73   11/12/24 126/78   08/26/24 120/80   08/13/24 125/74   06/04/24 122/77   10/06/23 (!) 142/72

## 2025-05-08 NOTE — PATIENT INSTRUCTIONS
Patient Education   Personalized Prevention Plan  You are due for the preventive services outlined below.  Your care team is available to assist you in scheduling these services.  If you have already completed any of these items, please share that information with your care team to update in your medical record.  Health Maintenance Due   Topic Date Due     Zoster (Shingles) Vaccine (1 of 2) Never done     Pneumococcal Vaccine (2 - PCV) 11/11/2022     Annual Wellness Visit  03/29/2023     ANNUAL REVIEW OF HM ORDERS  03/29/2023     Liver Monitoring Lab  06/17/2023     Basic Metabolic Panel  06/17/2023     Complete Blood Count  06/17/2023     Discuss Advance Care Planning  08/09/2023     Flu Vaccine (1) 09/01/2023     COVID-19 Vaccine (5 - 2023-24 season) 09/01/2023         Thanks for notification.  Close encounter

## 2025-05-11 ENCOUNTER — HEALTH MAINTENANCE LETTER (OUTPATIENT)
Age: 70
End: 2025-05-11

## 2025-05-12 ENCOUNTER — MYC REFILL (OUTPATIENT)
Dept: FAMILY MEDICINE | Facility: CLINIC | Age: 70
End: 2025-05-12
Payer: COMMERCIAL

## 2025-05-12 DIAGNOSIS — F90.0 ATTENTION DEFICIT HYPERACTIVITY DISORDER (ADHD), PREDOMINANTLY INATTENTIVE TYPE: ICD-10-CM

## 2025-05-12 RX ORDER — METHYLPHENIDATE HYDROCHLORIDE 54 MG/1
54 TABLET ORAL EVERY MORNING
Qty: 30 TABLET | Refills: 0 | Status: SHIPPED | OUTPATIENT
Start: 2025-05-12

## 2025-05-12 NOTE — TELEPHONE ENCOUNTER
Diagnosis: 1) ADHD 2) insomnia 3) chronic pain  Medication : 1) Methylphenidate 54 mg CR 2) zolpidem 10 mg 3) pregabalin 200 mg tid  Quantity per month: 1) 30) 30 for 3 months 3) 90  Number of refills before follow up visit: 6 months - may be by lissett  CSA: 6/4/24  Utox: 6/4/24     has an appointment with me 6/13/2025    03/12/2025 03/10/2025 1 Methylphenidate Er 54 Mg Tab 30.00 30 Ma Win [id number] [facility] ([id number][place]   02/10/2025 01/16/2025 1 Methylphenidate Er 54 Mg Tab 30.00 30 Ma Win [id number] [facility] (5605[place]   01/10/2025 01/09/2025 1 Methylphenidate Er 54 Mg Tab 30.00 30 Ma Win [id number] [facility] (5605[place]

## 2025-06-21 ENCOUNTER — MYC REFILL (OUTPATIENT)
Dept: FAMILY MEDICINE | Facility: CLINIC | Age: 70
End: 2025-06-21
Payer: COMMERCIAL

## 2025-06-21 DIAGNOSIS — F90.0 ATTENTION DEFICIT HYPERACTIVITY DISORDER (ADHD), PREDOMINANTLY INATTENTIVE TYPE: ICD-10-CM

## 2025-06-21 DIAGNOSIS — M54.12 CERVICAL NEURALGIA: ICD-10-CM

## 2025-06-23 RX ORDER — METHYLPHENIDATE HYDROCHLORIDE 54 MG/1
54 TABLET ORAL EVERY MORNING
Qty: 30 TABLET | Refills: 0 | Status: SHIPPED | OUTPATIENT
Start: 2025-06-23

## 2025-06-23 RX ORDER — PREGABALIN 200 MG/1
CAPSULE ORAL
Qty: 90 CAPSULE | Refills: 3 | Status: SHIPPED | OUTPATIENT
Start: 2025-07-01

## 2025-06-23 NOTE — TELEPHONE ENCOUNTER
Diagnosis: 1) ADHD 2) insomnia 3) chronic pain  Medication : 1) Methylphenidate 54 mg CR 2) zolpidem 10 mg 3) pregabalin 200 mg tid  Quantity per month: 1) 30) 30 for 3 months 3) 90  Number of refills before follow up visit: 6 months - may be by lissett  CSA: 6/4/24  Utox: 6/4/24    Last visit 11/12/24  Next visit scheduled: 7/18/25 06/02/2025 02/28/2025 1 Pregabalin 200 Mg Capsule 90.00 30 Ma Win [id number] [facility] (5605[place] 2/2 4.02 [place]   05/12/2025 05/12/2025 1 Methylphenidate Er 54 Mg Tab 30.00 30 Ma Win [id number] [facility] (5605[place] 0/[place]   05/04/2025 02/28/2025 1 Pregabalin 200 Mg Capsule 90.00 30 Ma Win [id number] [facility] (5605) 1/2 4.02 [place]   04/11/2025 04/09/2025 1 Methylphenidate Er 54 Mg Tab 30.00 30 Ma Win [id number] [facility] (5605[place]   03/31/2025 02/28/2025 1 Pregabalin 200 Mg Capsule 90.00 30 Ma Win [id number] [facility] (5605) 0/2 4.02 [place]   03/12/2025 03/10/2025 1 Methylphenidate Er 54 Mg Tab 30.00 30 Ma Win [id number] [facility] (5605[place] 0/[place]

## 2025-07-30 DIAGNOSIS — G47.00 PERSISTENT INSOMNIA: ICD-10-CM

## 2025-07-31 RX ORDER — TRAZODONE HYDROCHLORIDE 100 MG/1
100 TABLET ORAL AT BEDTIME
Qty: 90 TABLET | Refills: 0 | Status: SHIPPED | OUTPATIENT
Start: 2025-07-31

## 2025-09-04 ENCOUNTER — MYC REFILL (OUTPATIENT)
Dept: FAMILY MEDICINE | Facility: CLINIC | Age: 70
End: 2025-09-04
Payer: COMMERCIAL

## 2025-09-04 DIAGNOSIS — F90.0 ATTENTION DEFICIT HYPERACTIVITY DISORDER (ADHD), PREDOMINANTLY INATTENTIVE TYPE: ICD-10-CM

## 2025-09-04 RX ORDER — METHYLPHENIDATE HYDROCHLORIDE 54 MG/1
54 TABLET ORAL EVERY MORNING
Qty: 30 TABLET | Refills: 0 | Status: SHIPPED | OUTPATIENT
Start: 2025-09-08